# Patient Record
Sex: FEMALE | Race: WHITE | NOT HISPANIC OR LATINO | Employment: UNEMPLOYED | URBAN - METROPOLITAN AREA
[De-identification: names, ages, dates, MRNs, and addresses within clinical notes are randomized per-mention and may not be internally consistent; named-entity substitution may affect disease eponyms.]

---

## 2024-01-01 ENCOUNTER — APPOINTMENT (OUTPATIENT)
Dept: CT IMAGING | Facility: HOSPITAL | Age: 74
DRG: 871 | End: 2024-01-01
Payer: MEDICARE

## 2024-01-01 ENCOUNTER — HOSPITAL ENCOUNTER (INPATIENT)
Facility: HOSPITAL | Age: 74
LOS: 8 days | DRG: 871 | End: 2024-08-23
Attending: EMERGENCY MEDICINE | Admitting: INTERNAL MEDICINE
Payer: MEDICARE

## 2024-01-01 ENCOUNTER — APPOINTMENT (OUTPATIENT)
Dept: GENERAL RADIOLOGY | Facility: HOSPITAL | Age: 74
DRG: 871 | End: 2024-01-01
Payer: MEDICARE

## 2024-01-01 ENCOUNTER — APPOINTMENT (OUTPATIENT)
Dept: NEUROLOGY | Facility: HOSPITAL | Age: 74
DRG: 871 | End: 2024-01-01
Payer: MEDICARE

## 2024-01-01 ENCOUNTER — APPOINTMENT (OUTPATIENT)
Dept: MRI IMAGING | Facility: HOSPITAL | Age: 74
DRG: 871 | End: 2024-01-01
Payer: MEDICARE

## 2024-01-01 ENCOUNTER — HOSPITAL ENCOUNTER (INPATIENT)
Facility: HOSPITAL | Age: 74
LOS: 1 days | End: 2024-08-24
Attending: STUDENT IN AN ORGANIZED HEALTH CARE EDUCATION/TRAINING PROGRAM | Admitting: STUDENT IN AN ORGANIZED HEALTH CARE EDUCATION/TRAINING PROGRAM
Payer: COMMERCIAL

## 2024-01-01 VITALS
WEIGHT: 169.53 LBS | DIASTOLIC BLOOD PRESSURE: 51 MMHG | TEMPERATURE: 99.6 F | RESPIRATION RATE: 12 BRPM | BODY MASS INDEX: 32.01 KG/M2 | HEIGHT: 61 IN | SYSTOLIC BLOOD PRESSURE: 71 MMHG | HEART RATE: 112 BPM | OXYGEN SATURATION: 89 %

## 2024-01-01 DIAGNOSIS — E87.0 HYPERNATREMIA: ICD-10-CM

## 2024-01-01 DIAGNOSIS — A41.9 SEPTIC SHOCK: Primary | ICD-10-CM

## 2024-01-01 DIAGNOSIS — R65.21 SEPTIC SHOCK: Primary | ICD-10-CM

## 2024-01-01 LAB
25(OH)D3 SERPL-MCNC: <6 NG/ML (ref 30–100)
A FLAVUS AB SER QL ID: NEGATIVE
A FUMIGATUS AB SER QL ID: NEGATIVE
A NIGER AB SER QL ID: NEGATIVE
ALBUMIN SERPL ELPH-MCNC: 1.8 G/DL (ref 2.9–4.4)
ALBUMIN SERPL-MCNC: 2.2 G/DL (ref 3.5–5.2)
ALBUMIN SERPL-MCNC: 2.2 G/DL (ref 3.5–5.2)
ALBUMIN SERPL-MCNC: 2.3 G/DL (ref 3.5–5.2)
ALBUMIN SERPL-MCNC: 2.3 G/DL (ref 3.5–5.2)
ALBUMIN SERPL-MCNC: 2.4 G/DL (ref 3.5–5.2)
ALBUMIN SERPL-MCNC: 2.5 G/DL (ref 3.5–5.2)
ALBUMIN SERPL-MCNC: 2.6 G/DL (ref 3.5–5.2)
ALBUMIN SERPL-MCNC: 2.9 G/DL (ref 3.5–5.2)
ALBUMIN SERPL-MCNC: 3.8 G/DL (ref 3.5–5.2)
ALBUMIN/GLOB SERPL: 0.6 {RATIO} (ref 0.7–1.7)
ALBUMIN/GLOB SERPL: 1 G/DL
ALBUMIN/GLOB SERPL: 1 G/DL
ALBUMIN/GLOB SERPL: 1.2 G/DL
ALBUMIN/GLOB SERPL: 1.5 G/DL
ALP SERPL-CCNC: 115 U/L (ref 39–117)
ALP SERPL-CCNC: 84 U/L (ref 39–117)
ALP SERPL-CCNC: 89 U/L (ref 39–117)
ALP SERPL-CCNC: 90 U/L (ref 39–117)
ALPHA1 GLOB SERPL ELPH-MCNC: 0.5 G/DL (ref 0–0.4)
ALPHA2 GLOB SERPL ELPH-MCNC: 0.9 G/DL (ref 0.4–1)
ALT SERPL W P-5'-P-CCNC: 36 U/L (ref 1–33)
ALT SERPL W P-5'-P-CCNC: 38 U/L (ref 1–33)
ALT SERPL W P-5'-P-CCNC: 38 U/L (ref 1–33)
ALT SERPL W P-5'-P-CCNC: 52 U/L (ref 1–33)
AMPHET+METHAMPHET UR QL: NEGATIVE
ANION GAP SERPL CALCULATED.3IONS-SCNC: 10 MMOL/L (ref 5–15)
ANION GAP SERPL CALCULATED.3IONS-SCNC: 10 MMOL/L (ref 5–15)
ANION GAP SERPL CALCULATED.3IONS-SCNC: 10.8 MMOL/L (ref 5–15)
ANION GAP SERPL CALCULATED.3IONS-SCNC: 11 MMOL/L (ref 5–15)
ANION GAP SERPL CALCULATED.3IONS-SCNC: 12 MMOL/L (ref 5–15)
ANION GAP SERPL CALCULATED.3IONS-SCNC: 12 MMOL/L (ref 5–15)
ANION GAP SERPL CALCULATED.3IONS-SCNC: 12.1 MMOL/L (ref 5–15)
ANION GAP SERPL CALCULATED.3IONS-SCNC: 12.4 MMOL/L (ref 5–15)
ANION GAP SERPL CALCULATED.3IONS-SCNC: 12.7 MMOL/L (ref 5–15)
ANION GAP SERPL CALCULATED.3IONS-SCNC: 13.7 MMOL/L (ref 5–15)
ANION GAP SERPL CALCULATED.3IONS-SCNC: 13.8 MMOL/L (ref 5–15)
ANION GAP SERPL CALCULATED.3IONS-SCNC: 8 MMOL/L (ref 5–15)
APTT PPP: 23.5 SECONDS (ref 22.7–35.4)
ARSENIC BLD-MCNC: 2 UG/L (ref 0–9)
ARTERIAL PATENCY WRIST A: POSITIVE
AST SERPL-CCNC: 32 U/L (ref 1–32)
AST SERPL-CCNC: 34 U/L (ref 1–32)
AST SERPL-CCNC: 44 U/L (ref 1–32)
AST SERPL-CCNC: 46 U/L (ref 1–32)
ATMOSPHERIC PRESS: 749.1 MMHG
ATMOSPHERIC PRESS: 753.2 MMHG
B DERMAT AB TITR SER: NEGATIVE {TITER}
B-GLOBULIN SERPL ELPH-MCNC: 0.8 G/DL (ref 0.7–1.3)
BACTERIA SPEC AEROBE CULT: NORMAL
BACTERIA SPEC AEROBE CULT: NORMAL
BACTERIA UR QL AUTO: ABNORMAL /HPF
BARBITURATES UR QL SCN: NEGATIVE
BASE EXCESS BLDA CALC-SCNC: -0.4 MMOL/L (ref 0–2)
BASE EXCESS BLDV CALC-SCNC: 2 MMOL/L (ref -2–2)
BASOPHILS # BLD AUTO: 0.02 10*3/MM3 (ref 0–0.2)
BASOPHILS # BLD AUTO: 0.03 10*3/MM3 (ref 0–0.2)
BASOPHILS # BLD AUTO: 0.04 10*3/MM3 (ref 0–0.2)
BASOPHILS # BLD AUTO: 0.04 10*3/MM3 (ref 0–0.2)
BASOPHILS # BLD MANUAL: 0 10*3/MM3 (ref 0–0.2)
BASOPHILS NFR BLD AUTO: 0.1 % (ref 0–1.5)
BASOPHILS NFR BLD AUTO: 0.2 % (ref 0–1.5)
BASOPHILS NFR BLD MANUAL: 0 % (ref 0–1.5)
BDY SITE: ABNORMAL
BENZODIAZ UR QL SCN: NEGATIVE
BILIRUB SERPL-MCNC: 0.6 MG/DL (ref 0–1.2)
BILIRUB SERPL-MCNC: 0.6 MG/DL (ref 0–1.2)
BILIRUB SERPL-MCNC: 0.8 MG/DL (ref 0–1.2)
BILIRUB SERPL-MCNC: 0.9 MG/DL (ref 0–1.2)
BILIRUB UR QL STRIP: NEGATIVE
BUN SERPL-MCNC: 11 MG/DL (ref 8–23)
BUN SERPL-MCNC: 11 MG/DL (ref 8–23)
BUN SERPL-MCNC: 20 MG/DL (ref 8–23)
BUN SERPL-MCNC: 23 MG/DL (ref 8–23)
BUN SERPL-MCNC: 27 MG/DL (ref 8–23)
BUN SERPL-MCNC: 30 MG/DL (ref 8–23)
BUN SERPL-MCNC: 30 MG/DL (ref 8–23)
BUN SERPL-MCNC: 6 MG/DL (ref 8–23)
BUN SERPL-MCNC: 7 MG/DL (ref 8–23)
BUN SERPL-MCNC: 7 MG/DL (ref 8–23)
BUN SERPL-MCNC: 8 MG/DL (ref 8–23)
BUN SERPL-MCNC: 8 MG/DL (ref 8–23)
BUN/CREAT SERPL: 13.3 (ref 7–25)
BUN/CREAT SERPL: 14.6 (ref 7–25)
BUN/CREAT SERPL: 15.2 (ref 7–25)
BUN/CREAT SERPL: 15.4 (ref 7–25)
BUN/CREAT SERPL: 16 (ref 7–25)
BUN/CREAT SERPL: 17.5 (ref 7–25)
BUN/CREAT SERPL: 22 (ref 7–25)
BUN/CREAT SERPL: 23.4 (ref 7–25)
BUN/CREAT SERPL: 26.7 (ref 7–25)
BUN/CREAT SERPL: 28.4 (ref 7–25)
BUN/CREAT SERPL: 29.7 (ref 7–25)
BUN/CREAT SERPL: 33.8 (ref 7–25)
C-ANCA TITR SER IF: NORMAL TITER
C3 SERPL-MCNC: 153 MG/DL (ref 82–167)
C4 SERPL-MCNC: 30 MG/DL (ref 14–44)
CA-I BLDA-SCNC: 1.23 MMOL/L (ref 2.2–2.55)
CADMIUM BLD-MCNC: 1.6 UG/L (ref 0–1.2)
CALCIUM 24H UR-MCNC: 1.5 MG/DL
CALCIUM 24H UR-MRATE: 18 MG/24 HR (ref 100–300)
CALCIUM SPEC-SCNC: 10.4 MG/DL (ref 8.6–10.5)
CALCIUM SPEC-SCNC: 7.8 MG/DL (ref 8.6–10.5)
CALCIUM SPEC-SCNC: 8.1 MG/DL (ref 8.6–10.5)
CALCIUM SPEC-SCNC: 8.1 MG/DL (ref 8.6–10.5)
CALCIUM SPEC-SCNC: 8.2 MG/DL (ref 8.6–10.5)
CALCIUM SPEC-SCNC: 8.3 MG/DL (ref 8.6–10.5)
CALCIUM SPEC-SCNC: 8.4 MG/DL (ref 8.6–10.5)
CALCIUM SPEC-SCNC: 8.4 MG/DL (ref 8.6–10.5)
CALCIUM SPEC-SCNC: 8.9 MG/DL (ref 8.6–10.5)
CANNABINOIDS SERPL QL: NEGATIVE
CENTROMERE B AB SER-ACNC: <0.2 AI (ref 0–0.9)
CHLORIDE SERPL-SCNC: 107 MMOL/L (ref 98–107)
CHLORIDE SERPL-SCNC: 112 MMOL/L (ref 98–107)
CHLORIDE SERPL-SCNC: 113 MMOL/L (ref 98–107)
CHLORIDE SERPL-SCNC: 113 MMOL/L (ref 98–107)
CHLORIDE SERPL-SCNC: 114 MMOL/L (ref 98–107)
CHLORIDE SERPL-SCNC: 115 MMOL/L (ref 98–107)
CHLORIDE SERPL-SCNC: 118 MMOL/L (ref 98–107)
CHLORIDE SERPL-SCNC: 124 MMOL/L (ref 98–107)
CHLORIDE SERPL-SCNC: 125 MMOL/L (ref 98–107)
CHLORIDE SERPL-SCNC: 126 MMOL/L (ref 98–107)
CHLORIDE SERPL-SCNC: 126 MMOL/L (ref 98–107)
CHLORIDE SERPL-SCNC: 129 MMOL/L (ref 98–107)
CHROMATIN AB SERPL-ACNC: <0.2 AI (ref 0–0.9)
CK SERPL-CCNC: 157 U/L (ref 20–180)
CLARITY UR: CLEAR
CO2 BLDA-SCNC: 23.2 MMOL/L (ref 23–27)
CO2 SERPL-SCNC: 17 MMOL/L (ref 22–29)
CO2 SERPL-SCNC: 17 MMOL/L (ref 22–29)
CO2 SERPL-SCNC: 17.6 MMOL/L (ref 22–29)
CO2 SERPL-SCNC: 17.9 MMOL/L (ref 22–29)
CO2 SERPL-SCNC: 18.3 MMOL/L (ref 22–29)
CO2 SERPL-SCNC: 19 MMOL/L (ref 22–29)
CO2 SERPL-SCNC: 20 MMOL/L (ref 22–29)
CO2 SERPL-SCNC: 20 MMOL/L (ref 22–29)
CO2 SERPL-SCNC: 20.2 MMOL/L (ref 22–29)
CO2 SERPL-SCNC: 20.3 MMOL/L (ref 22–29)
CO2 SERPL-SCNC: 21 MMOL/L (ref 22–29)
CO2 SERPL-SCNC: 24.2 MMOL/L (ref 22–29)
COCAINE UR QL: NEGATIVE
COLLECT DURATION TIME UR: 24 HRS
COLOR UR: ABNORMAL
CREAT SERPL-MCNC: 0.45 MG/DL (ref 0.57–1)
CREAT SERPL-MCNC: 0.46 MG/DL (ref 0.57–1)
CREAT SERPL-MCNC: 0.48 MG/DL (ref 0.57–1)
CREAT SERPL-MCNC: 0.5 MG/DL (ref 0.57–1)
CREAT SERPL-MCNC: 0.5 MG/DL (ref 0.57–1)
CREAT SERPL-MCNC: 0.52 MG/DL (ref 0.57–1)
CREAT SERPL-MCNC: 0.63 MG/DL (ref 0.57–1)
CREAT SERPL-MCNC: 0.75 MG/DL (ref 0.57–1)
CREAT SERPL-MCNC: 0.8 MG/DL (ref 0.57–1)
CREAT SERPL-MCNC: 0.81 MG/DL (ref 0.57–1)
CREAT SERPL-MCNC: 1.01 MG/DL (ref 0.57–1)
CREAT SERPL-MCNC: 1.28 MG/DL (ref 0.57–1)
D-LACTATE SERPL-SCNC: 1.9 MMOL/L (ref 0.5–2)
D-LACTATE SERPL-SCNC: 2.2 MMOL/L (ref 0.5–2)
D-LACTATE SERPL-SCNC: 2.2 MMOL/L (ref 0.5–2)
D-LACTATE SERPL-SCNC: 2.8 MMOL/L (ref 0.5–2)
D-LACTATE SERPL-SCNC: 3.2 MMOL/L (ref 0.5–2)
D-LACTATE SERPL-SCNC: 4.1 MMOL/L (ref 0.5–2)
DACRYOCYTES BLD QL SMEAR: ABNORMAL
DEPRECATED RDW RBC AUTO: 45.4 FL (ref 37–54)
DEPRECATED RDW RBC AUTO: 46.5 FL (ref 37–54)
DEPRECATED RDW RBC AUTO: 46.5 FL (ref 37–54)
DEPRECATED RDW RBC AUTO: 47.1 FL (ref 37–54)
DEPRECATED RDW RBC AUTO: 48.4 FL (ref 37–54)
DEVICE COMMENT: ABNORMAL
DSDNA AB SER-ACNC: <1 IU/ML (ref 0–9)
EGFRCR SERPLBLD CKD-EPI 2021: 100.8 ML/MIN/1.73
EGFRCR SERPLBLD CKD-EPI 2021: 101.8 ML/MIN/1.73
EGFRCR SERPLBLD CKD-EPI 2021: 102.4 ML/MIN/1.73
EGFRCR SERPLBLD CKD-EPI 2021: 44.6 ML/MIN/1.73
EGFRCR SERPLBLD CKD-EPI 2021: 59.3 ML/MIN/1.73
EGFRCR SERPLBLD CKD-EPI 2021: 77.2 ML/MIN/1.73
EGFRCR SERPLBLD CKD-EPI 2021: 78.4 ML/MIN/1.73
EGFRCR SERPLBLD CKD-EPI 2021: 84.7 ML/MIN/1.73
EGFRCR SERPLBLD CKD-EPI 2021: 94.4 ML/MIN/1.73
EGFRCR SERPLBLD CKD-EPI 2021: 98.9 ML/MIN/1.73
EGFRCR SERPLBLD CKD-EPI 2021: 99.8 ML/MIN/1.73
EGFRCR SERPLBLD CKD-EPI 2021: 99.8 ML/MIN/1.73
ENA JO1 AB SER-ACNC: <0.2 AI (ref 0–0.9)
ENA RNP AB SER-ACNC: <0.2 AI (ref 0–0.9)
ENA SCL70 AB SER-ACNC: <0.2 AI (ref 0–0.9)
ENA SM AB SER-ACNC: <0.2 AI (ref 0–0.9)
ENA SS-A AB SER-ACNC: <0.2 AI (ref 0–0.9)
ENA SS-B AB SER-ACNC: <0.2 AI (ref 0–0.9)
EOSINOPHIL # BLD AUTO: 0.01 10*3/MM3 (ref 0–0.4)
EOSINOPHIL # BLD AUTO: 0.03 10*3/MM3 (ref 0–0.4)
EOSINOPHIL # BLD AUTO: 0.03 10*3/MM3 (ref 0–0.4)
EOSINOPHIL # BLD AUTO: 0.04 10*3/MM3 (ref 0–0.4)
EOSINOPHIL # BLD MANUAL: 0 10*3/MM3 (ref 0–0.4)
EOSINOPHIL NFR BLD AUTO: 0 % (ref 0.3–6.2)
EOSINOPHIL NFR BLD AUTO: 0.2 % (ref 0.3–6.2)
EOSINOPHIL NFR BLD MANUAL: 0 % (ref 0.3–6.2)
ERYTHROCYTE [DISTWIDTH] IN BLOOD BY AUTOMATED COUNT: 14.4 % (ref 12.3–15.4)
ERYTHROCYTE [DISTWIDTH] IN BLOOD BY AUTOMATED COUNT: 14.5 % (ref 12.3–15.4)
ERYTHROCYTE [DISTWIDTH] IN BLOOD BY AUTOMATED COUNT: 14.7 % (ref 12.3–15.4)
ERYTHROCYTE [DISTWIDTH] IN BLOOD BY AUTOMATED COUNT: 14.8 % (ref 12.3–15.4)
ERYTHROCYTE [DISTWIDTH] IN BLOOD BY AUTOMATED COUNT: 15 % (ref 12.3–15.4)
FENTANYL UR-MCNC: NEGATIVE NG/ML
FOLATE SERPL-MCNC: <2 NG/ML (ref 4.78–24.2)
GAMMA GLOB SERPL ELPH-MCNC: 0.6 G/DL (ref 0.4–1.8)
GAS FLOW AIRWAY: 4 LPM
GEN 5 2HR TROPONIN T REFLEX: 30 NG/L
GLOBULIN SER CALC-MCNC: 2.8 G/DL (ref 2.2–3.9)
GLOBULIN UR ELPH-MCNC: 2 GM/DL
GLOBULIN UR ELPH-MCNC: 2.5 GM/DL
GLOBULIN UR ELPH-MCNC: 2.5 GM/DL
GLOBULIN UR ELPH-MCNC: 3.2 GM/DL
GLUCOSE BLDC GLUCOMTR-MCNC: 104 MG/DL (ref 70–130)
GLUCOSE BLDC GLUCOMTR-MCNC: 105 MG/DL (ref 70–130)
GLUCOSE BLDC GLUCOMTR-MCNC: 110 MG/DL (ref 70–130)
GLUCOSE BLDC GLUCOMTR-MCNC: 114 MG/DL (ref 70–130)
GLUCOSE BLDC GLUCOMTR-MCNC: 118 MG/DL (ref 70–130)
GLUCOSE BLDC GLUCOMTR-MCNC: 119 MG/DL (ref 70–130)
GLUCOSE BLDC GLUCOMTR-MCNC: 120 MG/DL (ref 70–130)
GLUCOSE BLDC GLUCOMTR-MCNC: 122 MG/DL (ref 70–130)
GLUCOSE BLDC GLUCOMTR-MCNC: 122 MG/DL (ref 70–130)
GLUCOSE BLDC GLUCOMTR-MCNC: 123 MG/DL (ref 70–130)
GLUCOSE BLDC GLUCOMTR-MCNC: 127 MG/DL (ref 70–130)
GLUCOSE BLDC GLUCOMTR-MCNC: 128 MG/DL (ref 70–130)
GLUCOSE BLDC GLUCOMTR-MCNC: 129 MG/DL (ref 70–130)
GLUCOSE BLDC GLUCOMTR-MCNC: 136 MG/DL (ref 70–130)
GLUCOSE BLDC GLUCOMTR-MCNC: 139 MG/DL (ref 70–130)
GLUCOSE BLDC GLUCOMTR-MCNC: 140 MG/DL (ref 70–130)
GLUCOSE BLDC GLUCOMTR-MCNC: 144 MG/DL (ref 70–130)
GLUCOSE BLDC GLUCOMTR-MCNC: 145 MG/DL (ref 70–130)
GLUCOSE BLDC GLUCOMTR-MCNC: 147 MG/DL (ref 70–130)
GLUCOSE BLDC GLUCOMTR-MCNC: 150 MG/DL (ref 70–130)
GLUCOSE BLDC GLUCOMTR-MCNC: 153 MG/DL (ref 70–130)
GLUCOSE BLDC GLUCOMTR-MCNC: 154 MG/DL (ref 70–130)
GLUCOSE BLDC GLUCOMTR-MCNC: 158 MG/DL (ref 65–99)
GLUCOSE BLDC GLUCOMTR-MCNC: 162 MG/DL (ref 70–130)
GLUCOSE BLDC GLUCOMTR-MCNC: 163 MG/DL (ref 70–130)
GLUCOSE BLDC GLUCOMTR-MCNC: 163 MG/DL (ref 70–130)
GLUCOSE BLDC GLUCOMTR-MCNC: 184 MG/DL (ref 70–130)
GLUCOSE BLDC GLUCOMTR-MCNC: 220 MG/DL (ref 70–130)
GLUCOSE BLDC GLUCOMTR-MCNC: 93 MG/DL (ref 70–130)
GLUCOSE BLDC GLUCOMTR-MCNC: 95 MG/DL (ref 70–130)
GLUCOSE SERPL-MCNC: 107 MG/DL (ref 65–99)
GLUCOSE SERPL-MCNC: 109 MG/DL (ref 65–99)
GLUCOSE SERPL-MCNC: 118 MG/DL (ref 65–99)
GLUCOSE SERPL-MCNC: 127 MG/DL (ref 65–99)
GLUCOSE SERPL-MCNC: 144 MG/DL (ref 65–99)
GLUCOSE SERPL-MCNC: 144 MG/DL (ref 65–99)
GLUCOSE SERPL-MCNC: 145 MG/DL (ref 65–99)
GLUCOSE SERPL-MCNC: 149 MG/DL (ref 65–99)
GLUCOSE SERPL-MCNC: 170 MG/DL (ref 65–99)
GLUCOSE SERPL-MCNC: 88 MG/DL (ref 65–99)
GLUCOSE SERPL-MCNC: 89 MG/DL (ref 65–99)
GLUCOSE SERPL-MCNC: 94 MG/DL (ref 65–99)
GLUCOSE UR STRIP-MCNC: NEGATIVE MG/DL
H CAPSUL AG UR QL IA: NEGATIVE
HCO3 BLDA-SCNC: 22.3 MMOL/L (ref 22–28)
HCO3 BLDV-SCNC: 26.5 MMOL/L (ref 22–28)
HCT VFR BLD AUTO: 26 % (ref 34–46.6)
HCT VFR BLD AUTO: 29.7 % (ref 34–46.6)
HCT VFR BLD AUTO: 29.9 % (ref 34–46.6)
HCT VFR BLD AUTO: 33.8 % (ref 34–46.6)
HCT VFR BLD AUTO: 44.8 % (ref 34–46.6)
HCT VFR BLDA CALC: 43 % (ref 38–51)
HEMODILUTION: NO
HGB BLD-MCNC: 10.2 G/DL (ref 12–15.9)
HGB BLD-MCNC: 11.2 G/DL (ref 12–15.9)
HGB BLD-MCNC: 14.7 G/DL (ref 12–15.9)
HGB BLD-MCNC: 8.6 G/DL (ref 12–15.9)
HGB BLD-MCNC: 9.8 G/DL (ref 12–15.9)
HGB BLDA-MCNC: 14.8 G/DL (ref 12–17)
HGB UR QL STRIP.AUTO: NEGATIVE
HOLD SPECIMEN: NORMAL
HYALINE CASTS UR QL AUTO: ABNORMAL /LPF
HYPOCHROMIA BLD QL: ABNORMAL
IMM GRANULOCYTES # BLD AUTO: 0.21 10*3/MM3 (ref 0–0.05)
IMM GRANULOCYTES # BLD AUTO: 0.24 10*3/MM3 (ref 0–0.05)
IMM GRANULOCYTES # BLD AUTO: 0.3 10*3/MM3 (ref 0–0.05)
IMM GRANULOCYTES # BLD AUTO: 0.3 10*3/MM3 (ref 0–0.05)
IMM GRANULOCYTES NFR BLD AUTO: 1.3 % (ref 0–0.5)
IMM GRANULOCYTES NFR BLD AUTO: 1.4 % (ref 0–0.5)
IMM GRANULOCYTES NFR BLD AUTO: 1.6 % (ref 0–0.5)
IMM GRANULOCYTES NFR BLD AUTO: 1.6 % (ref 0–0.5)
INHALED O2 CONCENTRATION: 21 %
INR PPP: 1.24 (ref 0.9–1.1)
KAPPA LC FREE 24H UR-MRATE: 323.95 MG/24 HR
KAPPA LC FREE UR-MCNC: 269.96 MG/L (ref 1.17–86.46)
KAPPA LC FREE/LAMBDA FREE UR: 3.48 (ref 1.83–14.26)
KETONES UR QL STRIP: ABNORMAL
LABORATORY COMMENT REPORT: ABNORMAL
LAMBDA LC FREE 24H UR-MRATE: 92.99 MG/24 HR
LAMBDA LC FREE UR-MCNC: 77.49 MG/L (ref 0.27–15.21)
LEAD BLDV-MCNC: <1 UG/DL (ref 0–3.4)
LEUKOCYTE ESTERASE UR QL STRIP.AUTO: ABNORMAL
LIPASE SERPL-CCNC: 31 U/L (ref 13–60)
LYMPHOCYTES # BLD AUTO: 1.15 10*3/MM3 (ref 0.7–3.1)
LYMPHOCYTES # BLD AUTO: 1.3 10*3/MM3 (ref 0.7–3.1)
LYMPHOCYTES # BLD AUTO: 1.42 10*3/MM3 (ref 0.7–3.1)
LYMPHOCYTES # BLD AUTO: 1.64 10*3/MM3 (ref 0.7–3.1)
LYMPHOCYTES # BLD MANUAL: 0.77 10*3/MM3 (ref 0.7–3.1)
LYMPHOCYTES NFR BLD AUTO: 6.1 % (ref 19.6–45.3)
LYMPHOCYTES NFR BLD AUTO: 7.5 % (ref 19.6–45.3)
LYMPHOCYTES NFR BLD AUTO: 7.6 % (ref 19.6–45.3)
LYMPHOCYTES NFR BLD AUTO: 9.9 % (ref 19.6–45.3)
LYMPHOCYTES NFR BLD MANUAL: 8.2 % (ref 5–12)
Lab: NORMAL
M PROTEIN SERPL ELPH-MCNC: ABNORMAL G/DL
MAGNESIUM SERPL-MCNC: 2.1 MG/DL (ref 1.6–2.4)
MAGNESIUM SERPL-MCNC: 2.9 MG/DL (ref 1.6–2.4)
MCH RBC QN AUTO: 29.3 PG (ref 26.6–33)
MCH RBC QN AUTO: 29.3 PG (ref 26.6–33)
MCH RBC QN AUTO: 29.7 PG (ref 26.6–33)
MCH RBC QN AUTO: 30.4 PG (ref 26.6–33)
MCH RBC QN AUTO: 30.4 PG (ref 26.6–33)
MCHC RBC AUTO-ENTMCNC: 32.8 G/DL (ref 31.5–35.7)
MCHC RBC AUTO-ENTMCNC: 33 G/DL (ref 31.5–35.7)
MCHC RBC AUTO-ENTMCNC: 33.1 G/DL (ref 31.5–35.7)
MCHC RBC AUTO-ENTMCNC: 33.1 G/DL (ref 31.5–35.7)
MCHC RBC AUTO-ENTMCNC: 34.1 G/DL (ref 31.5–35.7)
MCV RBC AUTO: 88.4 FL (ref 79–97)
MCV RBC AUTO: 88.9 FL (ref 79–97)
MCV RBC AUTO: 89.3 FL (ref 79–97)
MCV RBC AUTO: 90.5 FL (ref 79–97)
MCV RBC AUTO: 91.8 FL (ref 79–97)
MERCURY BLD-MCNC: <1 UG/L (ref 0–14.9)
METAMYELOCYTES NFR BLD MANUAL: 1 % (ref 0–0)
METHADONE UR QL SCN: NEGATIVE
MODALITY: ABNORMAL
MODALITY: ABNORMAL
MONOCYTES # BLD AUTO: 0.66 10*3/MM3 (ref 0.1–0.9)
MONOCYTES # BLD AUTO: 1.19 10*3/MM3 (ref 0.1–0.9)
MONOCYTES # BLD AUTO: 1.22 10*3/MM3 (ref 0.1–0.9)
MONOCYTES # BLD AUTO: 1.41 10*3/MM3 (ref 0.1–0.9)
MONOCYTES # BLD: 0.89 10*3/MM3 (ref 0.1–0.9)
MONOCYTES NFR BLD AUTO: 4.4 % (ref 5–12)
MONOCYTES NFR BLD AUTO: 6.4 % (ref 5–12)
MONOCYTES NFR BLD AUTO: 6.6 % (ref 5–12)
MONOCYTES NFR BLD AUTO: 7.2 % (ref 5–12)
MYELOCYTES NFR BLD MANUAL: 2 % (ref 0–0)
MYELOPEROXIDASE AB SER IA-ACNC: <0.2 UNITS (ref 0–0.9)
NEUTROPHILS # BLD AUTO: 8.81 10*3/MM3 (ref 1.7–7)
NEUTROPHILS NFR BLD AUTO: 12.97 10*3/MM3 (ref 1.7–7)
NEUTROPHILS NFR BLD AUTO: 13.42 10*3/MM3 (ref 1.7–7)
NEUTROPHILS NFR BLD AUTO: 15.96 10*3/MM3 (ref 1.7–7)
NEUTROPHILS NFR BLD AUTO: 18.18 10*3/MM3 (ref 1.7–7)
NEUTROPHILS NFR BLD AUTO: 81.2 % (ref 42.7–76)
NEUTROPHILS NFR BLD AUTO: 84.1 % (ref 42.7–76)
NEUTROPHILS NFR BLD AUTO: 85.7 % (ref 42.7–76)
NEUTROPHILS NFR BLD AUTO: 86.1 % (ref 42.7–76)
NEUTROPHILS NFR BLD MANUAL: 81.6 % (ref 42.7–76)
NITRITE UR QL STRIP: POSITIVE
NOTIFIED WHO: ABNORMAL
NRBC BLD AUTO-RTO: 0.5 /100 WBC (ref 0–0.2)
NRBC BLD AUTO-RTO: 0.9 /100 WBC (ref 0–0.2)
NRBC BLD AUTO-RTO: 1 /100 WBC (ref 0–0.2)
NRBC SPEC MANUAL: 3.1 /100 WBC (ref 0–0.2)
NT-PROBNP SERPL-MCNC: 631 PG/ML (ref 0–900)
OPIATES UR QL: NEGATIVE
OXYCODONE UR QL SCN: NEGATIVE
P-ANCA ATYPICAL TITR SER IF: NORMAL TITER
P-ANCA TITR SER IF: NORMAL TITER
PCO2 BLDA: 28.9 MM HG (ref 35–45)
PCO2 BLDV: 40.4 MM HG (ref 41–51)
PH BLDA: 7.5 PH UNITS (ref 7.35–7.45)
PH BLDV: 7.43 PH UNITS (ref 7.31–7.41)
PH UR STRIP.AUTO: 5.5 [PH] (ref 5–8)
PHOSPHATE SERPL-MCNC: 1.5 MG/DL (ref 2.5–4.5)
PHOSPHATE SERPL-MCNC: 1.7 MG/DL (ref 2.5–4.5)
PHOSPHATE SERPL-MCNC: 1.7 MG/DL (ref 2.5–4.5)
PHOSPHATE SERPL-MCNC: 1.8 MG/DL (ref 2.5–4.5)
PHOSPHATE SERPL-MCNC: 1.8 MG/DL (ref 2.5–4.5)
PHOSPHATE SERPL-MCNC: 1.9 MG/DL (ref 2.5–4.5)
PHOSPHATE SERPL-MCNC: 2.1 MG/DL (ref 2.5–4.5)
PHOSPHATE SERPL-MCNC: 2.4 MG/DL (ref 2.5–4.5)
PHOSPHATE SERPL-MCNC: 2.5 MG/DL (ref 2.5–4.5)
PHOSPHATE SERPL-MCNC: 2.5 MG/DL (ref 2.5–4.5)
PHOSPHATE SERPL-MCNC: 3.4 MG/DL (ref 2.5–4.5)
PHOSPHATE SERPL-MCNC: 4.7 MG/DL (ref 2.5–4.5)
PLAT MORPH BLD: NORMAL
PLATELET # BLD AUTO: 123 10*3/MM3 (ref 140–450)
PLATELET # BLD AUTO: 138 10*3/MM3 (ref 140–450)
PLATELET # BLD AUTO: 143 10*3/MM3 (ref 140–450)
PLATELET # BLD AUTO: 183 10*3/MM3 (ref 140–450)
PLATELET # BLD AUTO: 238 10*3/MM3 (ref 140–450)
PLATELET # BLD AUTO: 243 10*3/MM3 (ref 140–450)
PMV BLD AUTO: 10.5 FL (ref 6–12)
PMV BLD AUTO: 10.6 FL (ref 6–12)
PMV BLD AUTO: 10.7 FL (ref 6–12)
PMV BLD AUTO: 10.8 FL (ref 6–12)
PMV BLD AUTO: 9.5 FL (ref 6–12)
PO2 BLDA: 55.6 MM HG (ref 80–100)
PO2 BLDV: 31.1 MM HG (ref 35–45)
POTASSIUM BLDA-SCNC: 3.5 MMOL/L (ref 3.5–5.2)
POTASSIUM SERPL-SCNC: 2.9 MMOL/L (ref 3.5–5.2)
POTASSIUM SERPL-SCNC: 3 MMOL/L (ref 3.5–5.2)
POTASSIUM SERPL-SCNC: 3.2 MMOL/L (ref 3.5–5.2)
POTASSIUM SERPL-SCNC: 3.3 MMOL/L (ref 3.5–5.2)
POTASSIUM SERPL-SCNC: 3.4 MMOL/L (ref 3.5–5.2)
POTASSIUM SERPL-SCNC: 3.5 MMOL/L (ref 3.5–5.2)
POTASSIUM SERPL-SCNC: 3.6 MMOL/L (ref 3.5–5.2)
POTASSIUM SERPL-SCNC: 3.8 MMOL/L (ref 3.5–5.2)
POTASSIUM SERPL-SCNC: 4.1 MMOL/L (ref 3.5–5.2)
POTASSIUM SERPL-SCNC: 4.5 MMOL/L (ref 3.5–5.2)
PROCALCITONIN SERPL-MCNC: 0.33 NG/ML (ref 0–0.25)
PROCALCITONIN SERPL-MCNC: 0.4 NG/ML (ref 0–0.25)
PROCALCITONIN SERPL-MCNC: 1.39 NG/ML (ref 0–0.25)
PROT PATTERN SERPL ELPH-IMP: ABNORMAL
PROT SERPL-MCNC: 4.6 G/DL (ref 6–8.5)
PROT SERPL-MCNC: 4.9 G/DL (ref 6–8.5)
PROT SERPL-MCNC: 5 G/DL (ref 6–8.5)
PROT SERPL-MCNC: 5.1 G/DL (ref 6–8.5)
PROT SERPL-MCNC: 7 G/DL (ref 6–8.5)
PROT UR QL STRIP: ABNORMAL
PROTEINASE3 AB SER IA-ACNC: <0.2 UNITS (ref 0–0.9)
PROTHROMBIN TIME: 15.8 SECONDS (ref 11.7–14.2)
PTH-INTACT SERPL-MCNC: 85.9 PG/ML (ref 15–65)
QT INTERVAL: 466 MS
QTC INTERVAL: 583 MS
RBC # BLD AUTO: 2.94 10*6/MM3 (ref 3.77–5.28)
RBC # BLD AUTO: 3.34 10*6/MM3 (ref 3.77–5.28)
RBC # BLD AUTO: 3.35 10*6/MM3 (ref 3.77–5.28)
RBC # BLD AUTO: 3.68 10*6/MM3 (ref 3.77–5.28)
RBC # BLD AUTO: 4.95 10*6/MM3 (ref 3.77–5.28)
RBC # UR STRIP: ABNORMAL /HPF
REF LAB TEST METHOD: ABNORMAL
RPR SER QL: NORMAL
SAO2 % BLDCOA: 91.5 % (ref 92–98.5)
SAO2 % BLDCOV: 61.2 % (ref 45–75)
SODIUM BLD-SCNC: 168 MMOL/L (ref 136–145)
SODIUM SERPL-SCNC: 141 MMOL/L (ref 136–145)
SODIUM SERPL-SCNC: 142 MMOL/L (ref 136–145)
SODIUM SERPL-SCNC: 142 MMOL/L (ref 136–145)
SODIUM SERPL-SCNC: 144 MMOL/L (ref 136–145)
SODIUM SERPL-SCNC: 144 MMOL/L (ref 136–145)
SODIUM SERPL-SCNC: 145 MMOL/L (ref 136–145)
SODIUM SERPL-SCNC: 146 MMOL/L (ref 136–145)
SODIUM SERPL-SCNC: 154 MMOL/L (ref 136–145)
SODIUM SERPL-SCNC: 154 MMOL/L (ref 136–145)
SODIUM SERPL-SCNC: 155 MMOL/L (ref 136–145)
SODIUM SERPL-SCNC: 162 MMOL/L (ref 136–145)
SODIUM SERPL-SCNC: 164 MMOL/L (ref 136–145)
SP GR UR STRIP: 1.03 (ref 1–1.03)
SPECIMEN VOL 24H UR: 1200 ML
SQUAMOUS #/AREA URNS HPF: ABNORMAL /HPF
TOTAL RATE: 18 BREATHS/MINUTE
TOTAL RATE: 22 BREATHS/MINUTE
TROPONIN T DELTA: 0 NG/L
TROPONIN T SERPL HS-MCNC: 30 NG/L
TSH SERPL DL<=0.05 MIU/L-ACNC: 2.07 UIU/ML (ref 0.27–4.2)
UROBILINOGEN UR QL STRIP: ABNORMAL
VARIANT LYMPHS NFR BLD MANUAL: 7.1 % (ref 19.6–45.3)
VIT B12 BLD-MCNC: 255 PG/ML (ref 211–946)
WBC # UR STRIP: ABNORMAL /HPF
WBC MORPH BLD: NORMAL
WBC NRBC COR # BLD AUTO: 10.8 10*3/MM3 (ref 3.4–10.8)
WBC NRBC COR # BLD AUTO: 15.07 10*3/MM3 (ref 3.4–10.8)
WBC NRBC COR # BLD AUTO: 16.54 10*3/MM3 (ref 3.4–10.8)
WBC NRBC COR # BLD AUTO: 18.96 10*3/MM3 (ref 3.4–10.8)
WBC NRBC COR # BLD AUTO: 21.24 10*3/MM3 (ref 3.4–10.8)
YEAST URNS QL MICRO: PRESENT /HPF

## 2024-01-01 PROCEDURE — 85025 COMPLETE CBC W/AUTO DIFF WBC: CPT

## 2024-01-01 PROCEDURE — 83655 ASSAY OF LEAD: CPT | Performed by: INTERNAL MEDICINE

## 2024-01-01 PROCEDURE — 81001 URINALYSIS AUTO W/SCOPE: CPT | Performed by: EMERGENCY MEDICINE

## 2024-01-01 PROCEDURE — 84165 PROTEIN E-PHORESIS SERUM: CPT | Performed by: INTERNAL MEDICINE

## 2024-01-01 PROCEDURE — 84100 ASSAY OF PHOSPHORUS: CPT | Performed by: EMERGENCY MEDICINE

## 2024-01-01 PROCEDURE — 85025 COMPLETE CBC W/AUTO DIFF WBC: CPT | Performed by: EMERGENCY MEDICINE

## 2024-01-01 PROCEDURE — 82948 REAGENT STRIP/BLOOD GLUCOSE: CPT

## 2024-01-01 PROCEDURE — 94761 N-INVAS EAR/PLS OXIMETRY MLT: CPT

## 2024-01-01 PROCEDURE — 80053 COMPREHEN METABOLIC PANEL: CPT | Performed by: INTERNAL MEDICINE

## 2024-01-01 PROCEDURE — 63710000001 INSULIN REGULAR HUMAN PER 5 UNITS

## 2024-01-01 PROCEDURE — 82550 ASSAY OF CK (CPK): CPT | Performed by: EMERGENCY MEDICINE

## 2024-01-01 PROCEDURE — 25010000002 CEFEPIME PER 500 MG: Performed by: INTERNAL MEDICINE

## 2024-01-01 PROCEDURE — 25810000003 SODIUM CHLORIDE 0.9 % SOLUTION: Performed by: INTERNAL MEDICINE

## 2024-01-01 PROCEDURE — 86698 HISTOPLASMA ANTIBODY: CPT | Performed by: INTERNAL MEDICINE

## 2024-01-01 PROCEDURE — 25010000002 LORAZEPAM PER 2 MG: Performed by: INTERNAL MEDICINE

## 2024-01-01 PROCEDURE — 86592 SYPHILIS TEST NON-TREP QUAL: CPT | Performed by: NURSE PRACTITIONER

## 2024-01-01 PROCEDURE — 97530 THERAPEUTIC ACTIVITIES: CPT

## 2024-01-01 PROCEDURE — 80307 DRUG TEST PRSMV CHEM ANLYZR: CPT | Performed by: STUDENT IN AN ORGANIZED HEALTH CARE EDUCATION/TRAINING PROGRAM

## 2024-01-01 PROCEDURE — 85007 BL SMEAR W/DIFF WBC COUNT: CPT

## 2024-01-01 PROCEDURE — 83605 ASSAY OF LACTIC ACID: CPT | Performed by: EMERGENCY MEDICINE

## 2024-01-01 PROCEDURE — 0 DEXTROSE 5 % SOLUTION 1,000 ML FLEX CONT: Performed by: INTERNAL MEDICINE

## 2024-01-01 PROCEDURE — 25810000003 SODIUM CHLORIDE 0.9 % SOLUTION: Performed by: EMERGENCY MEDICINE

## 2024-01-01 PROCEDURE — 83825 ASSAY OF MERCURY: CPT | Performed by: INTERNAL MEDICINE

## 2024-01-01 PROCEDURE — 25010000002 CEFEPIME PER 500 MG: Performed by: EMERGENCY MEDICINE

## 2024-01-01 PROCEDURE — 86037 ANCA TITER EACH ANTIBODY: CPT | Performed by: INTERNAL MEDICINE

## 2024-01-01 PROCEDURE — 86235 NUCLEAR ANTIGEN ANTIBODY: CPT | Performed by: INTERNAL MEDICINE

## 2024-01-01 PROCEDURE — 84132 ASSAY OF SERUM POTASSIUM: CPT | Performed by: INTERNAL MEDICINE

## 2024-01-01 PROCEDURE — 25810000003 LACTATED RINGERS PER 1000 ML: Performed by: INTERNAL MEDICINE

## 2024-01-01 PROCEDURE — 94668 MNPJ CHEST WALL SBSQ: CPT

## 2024-01-01 PROCEDURE — 85730 THROMBOPLASTIN TIME PARTIAL: CPT | Performed by: EMERGENCY MEDICINE

## 2024-01-01 PROCEDURE — 94799 UNLISTED PULMONARY SVC/PX: CPT

## 2024-01-01 PROCEDURE — 25010000002 ENOXAPARIN PER 10 MG: Performed by: INTERNAL MEDICINE

## 2024-01-01 PROCEDURE — 71045 X-RAY EXAM CHEST 1 VIEW: CPT

## 2024-01-01 PROCEDURE — 94640 AIRWAY INHALATION TREATMENT: CPT

## 2024-01-01 PROCEDURE — 25010000002 POTASSIUM CHLORIDE 10 MEQ/100ML SOLUTION: Performed by: INTERNAL MEDICINE

## 2024-01-01 PROCEDURE — 82306 VITAMIN D 25 HYDROXY: CPT | Performed by: NURSE PRACTITIONER

## 2024-01-01 PROCEDURE — 85610 PROTHROMBIN TIME: CPT | Performed by: EMERGENCY MEDICINE

## 2024-01-01 PROCEDURE — 87040 BLOOD CULTURE FOR BACTERIA: CPT | Performed by: EMERGENCY MEDICINE

## 2024-01-01 PROCEDURE — 84145 PROCALCITONIN (PCT): CPT

## 2024-01-01 PROCEDURE — 99233 SBSQ HOSP IP/OBS HIGH 50: CPT | Performed by: NURSE PRACTITIONER

## 2024-01-01 PROCEDURE — A9577 INJ MULTIHANCE: HCPCS | Performed by: INTERNAL MEDICINE

## 2024-01-01 PROCEDURE — 25010000002 MORPHINE PER 10 MG: Performed by: INTERNAL MEDICINE

## 2024-01-01 PROCEDURE — 87385 HISTOPLASMA CAPSUL AG IA: CPT | Performed by: INTERNAL MEDICINE

## 2024-01-01 PROCEDURE — 85025 COMPLETE CBC W/AUTO DIFF WBC: CPT | Performed by: INTERNAL MEDICINE

## 2024-01-01 PROCEDURE — 84100 ASSAY OF PHOSPHORUS: CPT | Performed by: INTERNAL MEDICINE

## 2024-01-01 PROCEDURE — 0 DEXTROSE 5 % SOLUTION: Performed by: INTERNAL MEDICINE

## 2024-01-01 PROCEDURE — 25010000002 GLYCOPYRROLATE 0.2 MG/ML SOLUTION: Performed by: INTERNAL MEDICINE

## 2024-01-01 PROCEDURE — 97166 OT EVAL MOD COMPLEX 45 MIN: CPT

## 2024-01-01 PROCEDURE — 84443 ASSAY THYROID STIM HORMONE: CPT | Performed by: EMERGENCY MEDICINE

## 2024-01-01 PROCEDURE — 84145 PROCALCITONIN (PCT): CPT | Performed by: INTERNAL MEDICINE

## 2024-01-01 PROCEDURE — 82803 BLOOD GASES ANY COMBINATION: CPT

## 2024-01-01 PROCEDURE — 99291 CRITICAL CARE FIRST HOUR: CPT | Performed by: NURSE PRACTITIONER

## 2024-01-01 PROCEDURE — 83735 ASSAY OF MAGNESIUM: CPT | Performed by: EMERGENCY MEDICINE

## 2024-01-01 PROCEDURE — 94667 MNPJ CHEST WALL 1ST: CPT

## 2024-01-01 PROCEDURE — 99222 1ST HOSP IP/OBS MODERATE 55: CPT | Performed by: STUDENT IN AN ORGANIZED HEALTH CARE EDUCATION/TRAINING PROGRAM

## 2024-01-01 PROCEDURE — 94664 DEMO&/EVAL PT USE INHALER: CPT

## 2024-01-01 PROCEDURE — 86160 COMPLEMENT ANTIGEN: CPT | Performed by: INTERNAL MEDICINE

## 2024-01-01 PROCEDURE — 70553 MRI BRAIN STEM W/O & W/DYE: CPT

## 2024-01-01 PROCEDURE — 82300 ASSAY OF CADMIUM: CPT | Performed by: INTERNAL MEDICINE

## 2024-01-01 PROCEDURE — 25010000002 POTASSIUM CHLORIDE PER 2 MEQ OF POTASSIUM: Performed by: INTERNAL MEDICINE

## 2024-01-01 PROCEDURE — 85049 AUTOMATED PLATELET COUNT: CPT | Performed by: INTERNAL MEDICINE

## 2024-01-01 PROCEDURE — 84590 ASSAY OF VITAMIN A: CPT | Performed by: NURSE PRACTITIONER

## 2024-01-01 PROCEDURE — 94760 N-INVAS EAR/PLS OXIMETRY 1: CPT

## 2024-01-01 PROCEDURE — 25010000002 GLYCOPYRROLATE 0.2 MG/ML SOLUTION: Performed by: STUDENT IN AN ORGANIZED HEALTH CARE EDUCATION/TRAINING PROGRAM

## 2024-01-01 PROCEDURE — 92610 EVALUATE SWALLOWING FUNCTION: CPT

## 2024-01-01 PROCEDURE — 86606 ASPERGILLUS ANTIBODY: CPT | Performed by: INTERNAL MEDICINE

## 2024-01-01 PROCEDURE — 25010000002 MORPHINE PER 10 MG: Performed by: STUDENT IN AN ORGANIZED HEALTH CARE EDUCATION/TRAINING PROGRAM

## 2024-01-01 PROCEDURE — 81050 URINALYSIS VOLUME MEASURE: CPT | Performed by: INTERNAL MEDICINE

## 2024-01-01 PROCEDURE — 86612 BLASTOMYCES ANTIBODY: CPT | Performed by: INTERNAL MEDICINE

## 2024-01-01 PROCEDURE — 99291 CRITICAL CARE FIRST HOUR: CPT

## 2024-01-01 PROCEDURE — 99497 ADVNCD CARE PLAN 30 MIN: CPT | Performed by: NURSE PRACTITIONER

## 2024-01-01 PROCEDURE — 84446 ASSAY OF VITAMIN E: CPT | Performed by: NURSE PRACTITIONER

## 2024-01-01 PROCEDURE — 95816 EEG AWAKE AND DROWSY: CPT | Performed by: PSYCHIATRY & NEUROLOGY

## 2024-01-01 PROCEDURE — 82175 ASSAY OF ARSENIC: CPT | Performed by: INTERNAL MEDICINE

## 2024-01-01 PROCEDURE — 25010000002 LORAZEPAM PER 2 MG: Performed by: STUDENT IN AN ORGANIZED HEALTH CARE EDUCATION/TRAINING PROGRAM

## 2024-01-01 PROCEDURE — 25010000002 POTASSIUM CHLORIDE 10 MEQ/100ML SOLUTION

## 2024-01-01 PROCEDURE — 80069 RENAL FUNCTION PANEL: CPT | Performed by: INTERNAL MEDICINE

## 2024-01-01 PROCEDURE — 0 GADOBENATE DIMEGLUMINE 529 MG/ML SOLUTION: Performed by: INTERNAL MEDICINE

## 2024-01-01 PROCEDURE — 25810000003 LACTATED RINGERS SOLUTION: Performed by: INTERNAL MEDICINE

## 2024-01-01 PROCEDURE — 25010000002 CYANOCOBALAMIN PER 1000 MCG: Performed by: NURSE PRACTITIONER

## 2024-01-01 PROCEDURE — 83735 ASSAY OF MAGNESIUM: CPT | Performed by: INTERNAL MEDICINE

## 2024-01-01 PROCEDURE — 99223 1ST HOSP IP/OBS HIGH 75: CPT | Performed by: NURSE PRACTITIONER

## 2024-01-01 PROCEDURE — 84484 ASSAY OF TROPONIN QUANT: CPT | Performed by: EMERGENCY MEDICINE

## 2024-01-01 PROCEDURE — 85018 HEMOGLOBIN: CPT

## 2024-01-01 PROCEDURE — 83690 ASSAY OF LIPASE: CPT | Performed by: EMERGENCY MEDICINE

## 2024-01-01 PROCEDURE — 74176 CT ABD & PELVIS W/O CONTRAST: CPT

## 2024-01-01 PROCEDURE — 82607 VITAMIN B-12: CPT | Performed by: STUDENT IN AN ORGANIZED HEALTH CARE EDUCATION/TRAINING PROGRAM

## 2024-01-01 PROCEDURE — 82330 ASSAY OF CALCIUM: CPT

## 2024-01-01 PROCEDURE — 86225 DNA ANTIBODY NATIVE: CPT | Performed by: INTERNAL MEDICINE

## 2024-01-01 PROCEDURE — 71250 CT THORAX DX C-: CPT

## 2024-01-01 PROCEDURE — 84145 PROCALCITONIN (PCT): CPT | Performed by: EMERGENCY MEDICINE

## 2024-01-01 PROCEDURE — 83516 IMMUNOASSAY NONANTIBODY: CPT | Performed by: INTERNAL MEDICINE

## 2024-01-01 PROCEDURE — 80053 COMPREHEN METABOLIC PANEL: CPT | Performed by: EMERGENCY MEDICINE

## 2024-01-01 PROCEDURE — 97162 PT EVAL MOD COMPLEX 30 MIN: CPT

## 2024-01-01 PROCEDURE — 80051 ELECTROLYTE PANEL: CPT

## 2024-01-01 PROCEDURE — 83970 ASSAY OF PARATHORMONE: CPT | Performed by: INTERNAL MEDICINE

## 2024-01-01 PROCEDURE — 36600 WITHDRAWAL OF ARTERIAL BLOOD: CPT

## 2024-01-01 PROCEDURE — 36415 COLL VENOUS BLD VENIPUNCTURE: CPT

## 2024-01-01 PROCEDURE — 82340 ASSAY OF CALCIUM IN URINE: CPT | Performed by: INTERNAL MEDICINE

## 2024-01-01 PROCEDURE — 82746 ASSAY OF FOLIC ACID SERUM: CPT | Performed by: STUDENT IN AN ORGANIZED HEALTH CARE EDUCATION/TRAINING PROGRAM

## 2024-01-01 PROCEDURE — 95816 EEG AWAKE AND DROWSY: CPT

## 2024-01-01 PROCEDURE — 80048 BASIC METABOLIC PNL TOTAL CA: CPT | Performed by: INTERNAL MEDICINE

## 2024-01-01 PROCEDURE — 93010 ELECTROCARDIOGRAM REPORT: CPT | Performed by: INTERNAL MEDICINE

## 2024-01-01 PROCEDURE — 25010000002 VANCOMYCIN 10 G RECONSTITUTED SOLUTION: Performed by: EMERGENCY MEDICINE

## 2024-01-01 PROCEDURE — P9612 CATHETERIZE FOR URINE SPEC: HCPCS

## 2024-01-01 PROCEDURE — 84155 ASSAY OF PROTEIN SERUM: CPT | Performed by: INTERNAL MEDICINE

## 2024-01-01 PROCEDURE — 93005 ELECTROCARDIOGRAM TRACING: CPT | Performed by: EMERGENCY MEDICINE

## 2024-01-01 PROCEDURE — 83521 IG LIGHT CHAINS FREE EACH: CPT | Performed by: INTERNAL MEDICINE

## 2024-01-01 PROCEDURE — 83880 ASSAY OF NATRIURETIC PEPTIDE: CPT | Performed by: EMERGENCY MEDICINE

## 2024-01-01 RX ORDER — LORAZEPAM 2 MG/ML
1 CONCENTRATE ORAL
Status: DISCONTINUED | OUTPATIENT
Start: 2024-01-01 | End: 2024-01-01 | Stop reason: HOSPADM

## 2024-01-01 RX ORDER — KETOROLAC TROMETHAMINE 15 MG/ML
15 INJECTION, SOLUTION INTRAMUSCULAR; INTRAVENOUS EVERY 6 HOURS PRN
Status: DISCONTINUED | OUTPATIENT
Start: 2024-01-01 | End: 2024-01-01 | Stop reason: HOSPADM

## 2024-01-01 RX ORDER — SCOLOPAMINE TRANSDERMAL SYSTEM 1 MG/1
1 PATCH, EXTENDED RELEASE TRANSDERMAL
Status: DISCONTINUED | OUTPATIENT
Start: 2024-01-01 | End: 2024-01-01 | Stop reason: HOSPADM

## 2024-01-01 RX ORDER — HALOPERIDOL 1 MG/1
1 TABLET ORAL EVERY 4 HOURS PRN
Status: DISCONTINUED | OUTPATIENT
Start: 2024-01-01 | End: 2024-01-01 | Stop reason: HOSPADM

## 2024-01-01 RX ORDER — HYDROMORPHONE HYDROCHLORIDE 2 MG/ML
1.5 INJECTION, SOLUTION INTRAMUSCULAR; INTRAVENOUS; SUBCUTANEOUS
Status: DISCONTINUED | OUTPATIENT
Start: 2024-01-01 | End: 2024-01-01 | Stop reason: HOSPADM

## 2024-01-01 RX ORDER — POTASSIUM CHLORIDE 7.45 MG/ML
10 INJECTION INTRAVENOUS
Status: COMPLETED | OUTPATIENT
Start: 2024-01-01 | End: 2024-01-01

## 2024-01-01 RX ORDER — DOCUSATE SODIUM 100 MG/1
100 CAPSULE, LIQUID FILLED ORAL 2 TIMES DAILY
COMMUNITY

## 2024-01-01 RX ORDER — LORAZEPAM 2 MG/ML
1 INJECTION INTRAMUSCULAR
Status: DISCONTINUED | OUTPATIENT
Start: 2024-01-01 | End: 2024-01-01 | Stop reason: HOSPADM

## 2024-01-01 RX ORDER — MORPHINE SULFATE 2 MG/ML
2 INJECTION, SOLUTION INTRAMUSCULAR; INTRAVENOUS
Status: DISCONTINUED | OUTPATIENT
Start: 2024-01-01 | End: 2024-01-01 | Stop reason: HOSPADM

## 2024-01-01 RX ORDER — DIPHENHYDRAMINE HYDROCHLORIDE 50 MG/ML
25 INJECTION INTRAMUSCULAR; INTRAVENOUS EVERY 6 HOURS PRN
Status: DISCONTINUED | OUTPATIENT
Start: 2024-01-01 | End: 2024-01-01 | Stop reason: HOSPADM

## 2024-01-01 RX ORDER — MORPHINE SULFATE 20 MG/ML
20 SOLUTION ORAL
Status: DISCONTINUED | OUTPATIENT
Start: 2024-01-01 | End: 2024-01-01 | Stop reason: HOSPADM

## 2024-01-01 RX ORDER — ACETAMINOPHEN 650 MG/1
650 SUPPOSITORY RECTAL EVERY 4 HOURS PRN
Status: DISCONTINUED | OUTPATIENT
Start: 2024-01-01 | End: 2024-01-01 | Stop reason: HOSPADM

## 2024-01-01 RX ORDER — LORAZEPAM 2 MG/ML
2 INJECTION INTRAMUSCULAR
Status: DISCONTINUED | OUTPATIENT
Start: 2024-01-01 | End: 2024-01-01 | Stop reason: HOSPADM

## 2024-01-01 RX ORDER — MORPHINE SULFATE 4 MG/ML
4 INJECTION, SOLUTION INTRAMUSCULAR; INTRAVENOUS
Status: DISCONTINUED | OUTPATIENT
Start: 2024-01-01 | End: 2024-01-01 | Stop reason: HOSPADM

## 2024-01-01 RX ORDER — VANCOMYCIN/0.9 % SOD CHLORIDE 1.5G/250ML
20 PLASTIC BAG, INJECTION (ML) INTRAVENOUS ONCE
Status: COMPLETED | OUTPATIENT
Start: 2024-01-01 | End: 2024-01-01

## 2024-01-01 RX ORDER — LORAZEPAM 2 MG/ML
0.5 INJECTION INTRAMUSCULAR
Status: DISCONTINUED | OUTPATIENT
Start: 2024-01-01 | End: 2024-01-01 | Stop reason: HOSPADM

## 2024-01-01 RX ORDER — LORAZEPAM 2 MG/ML
1 INJECTION INTRAMUSCULAR ONCE
Status: COMPLETED | OUTPATIENT
Start: 2024-01-01 | End: 2024-01-01

## 2024-01-01 RX ORDER — GLYCOPYRROLATE 0.2 MG/ML
0.4 INJECTION INTRAMUSCULAR; INTRAVENOUS
Status: DISCONTINUED | OUTPATIENT
Start: 2024-01-01 | End: 2024-01-01 | Stop reason: HOSPADM

## 2024-01-01 RX ORDER — MORPHINE SULFATE 20 MG/ML
10 SOLUTION ORAL
Status: DISCONTINUED | OUTPATIENT
Start: 2024-01-01 | End: 2024-01-01 | Stop reason: HOSPADM

## 2024-01-01 RX ORDER — LORAZEPAM 2 MG/ML
0.5 CONCENTRATE ORAL
Status: DISCONTINUED | OUTPATIENT
Start: 2024-01-01 | End: 2024-01-01 | Stop reason: HOSPADM

## 2024-01-01 RX ORDER — CYANOCOBALAMIN 1000 UG/ML
1000 INJECTION, SOLUTION INTRAMUSCULAR; SUBCUTANEOUS DAILY
Status: DISCONTINUED | OUTPATIENT
Start: 2024-01-01 | End: 2024-01-01

## 2024-01-01 RX ORDER — LORAZEPAM 2 MG/ML
2 CONCENTRATE ORAL
Status: DISCONTINUED | OUTPATIENT
Start: 2024-01-01 | End: 2024-01-01 | Stop reason: HOSPADM

## 2024-01-01 RX ORDER — DIPHENOXYLATE HCL/ATROPINE 2.5-.025MG
1 TABLET ORAL
Status: DISCONTINUED | OUTPATIENT
Start: 2024-01-01 | End: 2024-01-01 | Stop reason: HOSPADM

## 2024-01-01 RX ORDER — SODIUM CHLORIDE 0.9 % (FLUSH) 0.9 %
10 SYRINGE (ML) INJECTION EVERY 12 HOURS SCHEDULED
Status: DISCONTINUED | OUTPATIENT
Start: 2024-01-01 | End: 2024-01-01 | Stop reason: HOSPADM

## 2024-01-01 RX ORDER — BISACODYL 10 MG
10 SUPPOSITORY, RECTAL RECTAL 2 TIMES DAILY
Status: DISPENSED | OUTPATIENT
Start: 2024-01-01 | End: 2024-01-01

## 2024-01-01 RX ORDER — DEXTROSE MONOHYDRATE 25 G/50ML
25 INJECTION, SOLUTION INTRAVENOUS
Status: DISCONTINUED | OUTPATIENT
Start: 2024-01-01 | End: 2024-01-01

## 2024-01-01 RX ORDER — DEXTROSE MONOHYDRATE 50 MG/ML
125 INJECTION, SOLUTION INTRAVENOUS CONTINUOUS
Status: DISCONTINUED | OUTPATIENT
Start: 2024-01-01 | End: 2024-01-01

## 2024-01-01 RX ORDER — IBUPROFEN 600 MG/1
1 TABLET ORAL
Status: DISCONTINUED | OUTPATIENT
Start: 2024-01-01 | End: 2024-01-01

## 2024-01-01 RX ORDER — FENTANYL/ROPIVACAINE/NS/PF 2-625MCG/1
15 PLASTIC BAG, INJECTION (ML) EPIDURAL ONCE
Status: COMPLETED | OUTPATIENT
Start: 2024-01-01 | End: 2024-01-01

## 2024-01-01 RX ORDER — SODIUM CHLORIDE FOR INHALATION 7 %
4 VIAL, NEBULIZER (ML) INHALATION
Status: DISCONTINUED | OUTPATIENT
Start: 2024-01-01 | End: 2024-01-01

## 2024-01-01 RX ORDER — SODIUM CHLORIDE 0.9 % (FLUSH) 0.9 %
10 SYRINGE (ML) INJECTION AS NEEDED
Status: DISCONTINUED | OUTPATIENT
Start: 2024-01-01 | End: 2024-01-01 | Stop reason: HOSPADM

## 2024-01-01 RX ORDER — NYSTATIN 100000/ML
500000 SUSPENSION, ORAL (FINAL DOSE FORM) ORAL 4 TIMES DAILY
COMMUNITY

## 2024-01-01 RX ORDER — POTASSIUM CHLORIDE 1.5 G/1.58G
40 POWDER, FOR SOLUTION ORAL EVERY 4 HOURS
Status: DISCONTINUED | OUTPATIENT
Start: 2024-01-01 | End: 2024-01-01

## 2024-01-01 RX ORDER — ERGOCALCIFEROL 1.25 MG/1
50000 CAPSULE, LIQUID FILLED ORAL
Status: DISCONTINUED | OUTPATIENT
Start: 2024-01-01 | End: 2024-01-01

## 2024-01-01 RX ORDER — BISACODYL 5 MG/1
5 TABLET, DELAYED RELEASE ORAL DAILY PRN
Status: DISCONTINUED | OUTPATIENT
Start: 2024-01-01 | End: 2024-01-01

## 2024-01-01 RX ORDER — MORPHINE SULFATE 10 MG/ML
6 INJECTION INTRAMUSCULAR; INTRAVENOUS; SUBCUTANEOUS
Status: DISCONTINUED | OUTPATIENT
Start: 2024-01-01 | End: 2024-01-01 | Stop reason: HOSPADM

## 2024-01-01 RX ORDER — ACETAMINOPHEN 325 MG/1
650 TABLET ORAL EVERY 4 HOURS PRN
Status: DISCONTINUED | OUTPATIENT
Start: 2024-01-01 | End: 2024-01-01 | Stop reason: HOSPADM

## 2024-01-01 RX ORDER — IPRATROPIUM BROMIDE AND ALBUTEROL SULFATE 2.5; .5 MG/3ML; MG/3ML
3 SOLUTION RESPIRATORY (INHALATION)
Status: DISCONTINUED | OUTPATIENT
Start: 2024-01-01 | End: 2024-01-01

## 2024-01-01 RX ORDER — ENOXAPARIN SODIUM 100 MG/ML
40 INJECTION SUBCUTANEOUS NIGHTLY
Status: DISCONTINUED | OUTPATIENT
Start: 2024-01-01 | End: 2024-01-01

## 2024-01-01 RX ORDER — DIPHENHYDRAMINE HCL 25 MG
25 CAPSULE ORAL EVERY 6 HOURS PRN
Status: DISCONTINUED | OUTPATIENT
Start: 2024-01-01 | End: 2024-01-01 | Stop reason: HOSPADM

## 2024-01-01 RX ORDER — HALOPERIDOL 2 MG/ML
1 SOLUTION ORAL EVERY 4 HOURS PRN
Status: DISCONTINUED | OUTPATIENT
Start: 2024-01-01 | End: 2024-01-01 | Stop reason: HOSPADM

## 2024-01-01 RX ORDER — GLYCOPYRROLATE 0.2 MG/ML
0.2 INJECTION INTRAMUSCULAR; INTRAVENOUS
Status: DISCONTINUED | OUTPATIENT
Start: 2024-01-01 | End: 2024-01-01 | Stop reason: HOSPADM

## 2024-01-01 RX ORDER — POLYETHYLENE GLYCOL 3350 17 G/17G
17 POWDER, FOR SOLUTION ORAL DAILY PRN
Status: DISCONTINUED | OUTPATIENT
Start: 2024-01-01 | End: 2024-01-01

## 2024-01-01 RX ORDER — MORPHINE SULFATE 20 MG/ML
5 SOLUTION ORAL
Status: DISCONTINUED | OUTPATIENT
Start: 2024-01-01 | End: 2024-01-01 | Stop reason: HOSPADM

## 2024-01-01 RX ORDER — SODIUM CHLORIDE 0.9 % (FLUSH) 0.9 %
10 SYRINGE (ML) INJECTION AS NEEDED
Status: DISCONTINUED | OUTPATIENT
Start: 2024-01-01 | End: 2024-01-01

## 2024-01-01 RX ORDER — BISACODYL 10 MG
10 SUPPOSITORY, RECTAL RECTAL DAILY PRN
Status: DISCONTINUED | OUTPATIENT
Start: 2024-01-01 | End: 2024-01-01

## 2024-01-01 RX ORDER — HALOPERIDOL 5 MG/ML
1 INJECTION INTRAMUSCULAR EVERY 4 HOURS PRN
Status: DISCONTINUED | OUTPATIENT
Start: 2024-01-01 | End: 2024-01-01 | Stop reason: HOSPADM

## 2024-01-01 RX ORDER — AMOXICILLIN 250 MG
2 CAPSULE ORAL 2 TIMES DAILY
Status: DISCONTINUED | OUTPATIENT
Start: 2024-01-01 | End: 2024-01-01

## 2024-01-01 RX ORDER — MORPHINE SULFATE 2 MG/ML
4 INJECTION, SOLUTION INTRAMUSCULAR; INTRAVENOUS
Status: DISCONTINUED | OUTPATIENT
Start: 2024-01-01 | End: 2024-01-01 | Stop reason: HOSPADM

## 2024-01-01 RX ORDER — POTASSIUM CHLORIDE 1.5 G/1.58G
40 POWDER, FOR SOLUTION ORAL EVERY 4 HOURS
Status: COMPLETED | OUTPATIENT
Start: 2024-01-01 | End: 2024-01-01

## 2024-01-01 RX ORDER — ACETAMINOPHEN 160 MG/5ML
650 SOLUTION ORAL EVERY 4 HOURS PRN
Status: DISCONTINUED | OUTPATIENT
Start: 2024-01-01 | End: 2024-01-01 | Stop reason: HOSPADM

## 2024-01-01 RX ORDER — CHOLECALCIFEROL (VITAMIN D3) 25 MCG
5000 TABLET ORAL DAILY
Status: DISCONTINUED | OUTPATIENT
Start: 2024-01-01 | End: 2024-01-01

## 2024-01-01 RX ORDER — ONDANSETRON 4 MG/1
4 TABLET, ORALLY DISINTEGRATING ORAL EVERY 6 HOURS PRN
Status: DISCONTINUED | OUTPATIENT
Start: 2024-01-01 | End: 2024-01-01 | Stop reason: HOSPADM

## 2024-01-01 RX ORDER — SODIUM CHLORIDE 450 MG/100ML
150 INJECTION, SOLUTION INTRAVENOUS CONTINUOUS
Status: DISCONTINUED | OUTPATIENT
Start: 2024-01-01 | End: 2024-01-01

## 2024-01-01 RX ORDER — HYDROMORPHONE HYDROCHLORIDE 1 MG/ML
0.5 INJECTION, SOLUTION INTRAMUSCULAR; INTRAVENOUS; SUBCUTANEOUS
Status: DISCONTINUED | OUTPATIENT
Start: 2024-01-01 | End: 2024-01-01 | Stop reason: HOSPADM

## 2024-01-01 RX ORDER — ALUMINA, MAGNESIA, AND SIMETHICONE 2400; 2400; 240 MG/30ML; MG/30ML; MG/30ML
15 SUSPENSION ORAL EVERY 6 HOURS PRN
Status: DISCONTINUED | OUTPATIENT
Start: 2024-01-01 | End: 2024-01-01

## 2024-01-01 RX ORDER — NICOTINE POLACRILEX 4 MG
15 LOZENGE BUCCAL
Status: DISCONTINUED | OUTPATIENT
Start: 2024-01-01 | End: 2024-01-01

## 2024-01-01 RX ORDER — NITROGLYCERIN 0.4 MG/1
0.4 TABLET SUBLINGUAL
Status: DISCONTINUED | OUTPATIENT
Start: 2024-01-01 | End: 2024-01-01

## 2024-01-01 RX ORDER — SODIUM CHLORIDE 9 MG/ML
40 INJECTION, SOLUTION INTRAVENOUS AS NEEDED
Status: DISCONTINUED | OUTPATIENT
Start: 2024-01-01 | End: 2024-01-01 | Stop reason: HOSPADM

## 2024-01-01 RX ORDER — ONDANSETRON 2 MG/ML
4 INJECTION INTRAMUSCULAR; INTRAVENOUS EVERY 6 HOURS PRN
Status: DISCONTINUED | OUTPATIENT
Start: 2024-01-01 | End: 2024-01-01 | Stop reason: HOSPADM

## 2024-01-01 RX ORDER — NOREPINEPHRINE BITARTRATE 0.03 MG/ML
.02-.3 INJECTION, SOLUTION INTRAVENOUS
Status: DISCONTINUED | OUTPATIENT
Start: 2024-01-01 | End: 2024-01-01

## 2024-01-01 RX ADMIN — Medication 10 ML: at 21:03

## 2024-01-01 RX ADMIN — IPRATROPIUM BROMIDE AND ALBUTEROL SULFATE 3 ML: .5; 3 SOLUTION RESPIRATORY (INHALATION) at 14:11

## 2024-01-01 RX ADMIN — INSULIN HUMAN 2 UNITS: 100 INJECTION, SOLUTION PARENTERAL at 03:42

## 2024-01-01 RX ADMIN — POTASSIUM PHOSPHATE, MONOBASIC POTASSIUM PHOSPHATE, DIBASIC 15 MMOL: 224; 236 INJECTION, SOLUTION, CONCENTRATE INTRAVENOUS at 09:38

## 2024-01-01 RX ADMIN — Medication 10 ML: at 08:00

## 2024-01-01 RX ADMIN — GLYCOPYRROLATE 0.4 MG: 0.2 INJECTION INTRAMUSCULAR; INTRAVENOUS at 21:24

## 2024-01-01 RX ADMIN — POTASSIUM CHLORIDE 10 MEQ: 7.46 INJECTION, SOLUTION INTRAVENOUS at 10:36

## 2024-01-01 RX ADMIN — POTASSIUM CHLORIDE 40 MEQ: 1.5 FOR SOLUTION ORAL at 15:17

## 2024-01-01 RX ADMIN — Medication 10 ML: at 09:38

## 2024-01-01 RX ADMIN — ENOXAPARIN SODIUM 40 MG: 100 INJECTION SUBCUTANEOUS at 20:11

## 2024-01-01 RX ADMIN — CYANOCOBALAMIN 1000 MCG: 1000 INJECTION, SOLUTION INTRAMUSCULAR; SUBCUTANEOUS at 17:12

## 2024-01-01 RX ADMIN — SODIUM PHOSPHATE, MONOBASIC, MONOHYDRATE AND SODIUM PHOSPHATE, DIBASIC, ANHYDROUS 15 MMOL: 276; 142 INJECTION, SOLUTION INTRAVENOUS at 23:54

## 2024-01-01 RX ADMIN — FOLIC ACID 1 MG: 5 INJECTION, SOLUTION INTRAMUSCULAR; INTRAVENOUS; SUBCUTANEOUS at 09:15

## 2024-01-01 RX ADMIN — SODIUM CHLORIDE 150 ML/HR: 4.5 INJECTION, SOLUTION INTRAVENOUS at 10:28

## 2024-01-01 RX ADMIN — POTASSIUM CHLORIDE 10 MEQ: 7.46 INJECTION, SOLUTION INTRAVENOUS at 14:30

## 2024-01-01 RX ADMIN — Medication 10 ML: at 21:23

## 2024-01-01 RX ADMIN — POTASSIUM CHLORIDE 10 MEQ: 7.46 INJECTION, SOLUTION INTRAVENOUS at 00:42

## 2024-01-01 RX ADMIN — CEFEPIME 2000 MG: 2 INJECTION, POWDER, FOR SOLUTION INTRAVENOUS at 00:42

## 2024-01-01 RX ADMIN — Medication 10 ML: at 08:49

## 2024-01-01 RX ADMIN — SENNOSIDES AND DOCUSATE SODIUM 2 TABLET: 50; 8.6 TABLET ORAL at 20:11

## 2024-01-01 RX ADMIN — SODIUM PHOSPHATE, MONOBASIC, MONOHYDRATE AND SODIUM PHOSPHATE, DIBASIC, ANHYDROUS 15 MMOL: 276; 142 INJECTION, SOLUTION INTRAVENOUS at 16:02

## 2024-01-01 RX ADMIN — IPRATROPIUM BROMIDE AND ALBUTEROL SULFATE 3 ML: .5; 3 SOLUTION RESPIRATORY (INHALATION) at 10:25

## 2024-01-01 RX ADMIN — POTASSIUM CHLORIDE 10 MEQ: 7.46 INJECTION, SOLUTION INTRAVENOUS at 12:31

## 2024-01-01 RX ADMIN — FOLIC ACID 1 MG: 5 INJECTION, SOLUTION INTRAMUSCULAR; INTRAVENOUS; SUBCUTANEOUS at 08:48

## 2024-01-01 RX ADMIN — IPRATROPIUM BROMIDE AND ALBUTEROL SULFATE 3 ML: .5; 3 SOLUTION RESPIRATORY (INHALATION) at 15:18

## 2024-01-01 RX ADMIN — POTASSIUM, SODIUM PHOSPHATES 280 MG-160 MG-250 MG ORAL POWDER PACKET 2 PACKET: POWDER IN PACKET at 11:37

## 2024-01-01 RX ADMIN — SODIUM CHLORIDE 500 ML: 9 INJECTION, SOLUTION INTRAVENOUS at 15:39

## 2024-01-01 RX ADMIN — POTASSIUM CHLORIDE: 2 INJECTION, SOLUTION, CONCENTRATE INTRAVENOUS at 08:52

## 2024-01-01 RX ADMIN — GLYCOPYRROLATE 0.4 MG: 0.2 INJECTION INTRAMUSCULAR; INTRAVENOUS at 14:47

## 2024-01-01 RX ADMIN — LORAZEPAM 1 MG: 2 INJECTION INTRAMUSCULAR; INTRAVENOUS at 22:09

## 2024-01-01 RX ADMIN — POTASSIUM CHLORIDE 10 MEQ: 7.46 INJECTION, SOLUTION INTRAVENOUS at 01:42

## 2024-01-01 RX ADMIN — POTASSIUM CHLORIDE 10 MEQ: 7.46 INJECTION, SOLUTION INTRAVENOUS at 18:04

## 2024-01-01 RX ADMIN — Medication 10 ML: at 16:10

## 2024-01-01 RX ADMIN — SODIUM CHLORIDE 125 ML/HR: 4.5 INJECTION, SOLUTION INTRAVENOUS at 10:17

## 2024-01-01 RX ADMIN — DEXTROSE MONOHYDRATE 125 ML/HR: 50 INJECTION, SOLUTION INTRAVENOUS at 19:03

## 2024-01-01 RX ADMIN — MORPHINE SULFATE 4 MG: 2 INJECTION, SOLUTION INTRAMUSCULAR; INTRAVENOUS at 18:11

## 2024-01-01 RX ADMIN — POTASSIUM CHLORIDE 10 MEQ: 7.46 INJECTION, SOLUTION INTRAVENOUS at 10:31

## 2024-01-01 RX ADMIN — SENNOSIDES AND DOCUSATE SODIUM 2 TABLET: 50; 8.6 TABLET ORAL at 16:10

## 2024-01-01 RX ADMIN — MORPHINE SULFATE 4 MG: 4 INJECTION, SOLUTION INTRAMUSCULAR; INTRAVENOUS at 00:35

## 2024-01-01 RX ADMIN — MORPHINE SULFATE 4 MG: 4 INJECTION, SOLUTION INTRAMUSCULAR; INTRAVENOUS at 21:24

## 2024-01-01 RX ADMIN — POTASSIUM CHLORIDE 10 MEQ: 7.46 INJECTION, SOLUTION INTRAVENOUS at 23:08

## 2024-01-01 RX ADMIN — POTASSIUM PHOSPHATE, MONOBASIC POTASSIUM PHOSPHATE, DIBASIC 15 MMOL: 224; 236 INJECTION, SOLUTION, CONCENTRATE INTRAVENOUS at 11:29

## 2024-01-01 RX ADMIN — GADOBENATE DIMEGLUMINE 17 ML: 529 INJECTION, SOLUTION INTRAVENOUS at 22:48

## 2024-01-01 RX ADMIN — Medication 10 ML: at 10:12

## 2024-01-01 RX ADMIN — POTASSIUM CHLORIDE 10 MEQ: 7.46 INJECTION, SOLUTION INTRAVENOUS at 13:03

## 2024-01-01 RX ADMIN — IPRATROPIUM BROMIDE AND ALBUTEROL SULFATE 3 ML: .5; 3 SOLUTION RESPIRATORY (INHALATION) at 06:41

## 2024-01-01 RX ADMIN — POTASSIUM CHLORIDE 40 MEQ: 1.5 FOR SOLUTION ORAL at 21:02

## 2024-01-01 RX ADMIN — POTASSIUM CHLORIDE 40 MEQ: 1.5 FOR SOLUTION ORAL at 09:16

## 2024-01-01 RX ADMIN — CEFEPIME 2000 MG: 2 INJECTION, POWDER, FOR SOLUTION INTRAVENOUS at 11:27

## 2024-01-01 RX ADMIN — Medication 10 ML: at 22:23

## 2024-01-01 RX ADMIN — CEFEPIME 2000 MG: 2 INJECTION, POWDER, FOR SOLUTION INTRAVENOUS at 15:53

## 2024-01-01 RX ADMIN — CEFEPIME 2000 MG: 2 INJECTION, POWDER, FOR SOLUTION INTRAVENOUS at 19:15

## 2024-01-01 RX ADMIN — Medication 4 ML: at 06:40

## 2024-01-01 RX ADMIN — VANCOMYCIN HYDROCHLORIDE 1500 MG: 10 INJECTION, POWDER, LYOPHILIZED, FOR SOLUTION INTRAVENOUS at 16:54

## 2024-01-01 RX ADMIN — Medication 10 ML: at 20:10

## 2024-01-01 RX ADMIN — POTASSIUM CHLORIDE 10 MEQ: 7.46 INJECTION, SOLUTION INTRAVENOUS at 02:42

## 2024-01-01 RX ADMIN — Medication 10 ML: at 09:17

## 2024-01-01 RX ADMIN — SENNOSIDES AND DOCUSATE SODIUM 2 TABLET: 50; 8.6 TABLET ORAL at 21:00

## 2024-01-01 RX ADMIN — GLYCOPYRROLATE 0.4 MG: 0.2 INJECTION INTRAMUSCULAR; INTRAVENOUS at 00:36

## 2024-01-01 RX ADMIN — Medication 5000 UNITS: at 09:16

## 2024-01-01 RX ADMIN — Medication 10 ML: at 08:53

## 2024-01-01 RX ADMIN — ACETAMINOPHEN 325MG 650 MG: 325 TABLET ORAL at 13:55

## 2024-01-01 RX ADMIN — SODIUM PHOSPHATE, MONOBASIC, MONOHYDRATE AND SODIUM PHOSPHATE, DIBASIC, ANHYDROUS 15 MMOL: 276; 142 INJECTION, SOLUTION INTRAVENOUS at 12:49

## 2024-01-01 RX ADMIN — POTASSIUM CHLORIDE 40 MEQ: 1.5 FOR SOLUTION ORAL at 11:37

## 2024-01-01 RX ADMIN — IPRATROPIUM BROMIDE AND ALBUTEROL SULFATE 3 ML: .5; 3 SOLUTION RESPIRATORY (INHALATION) at 10:52

## 2024-01-01 RX ADMIN — Medication 4 ML: at 08:10

## 2024-01-01 RX ADMIN — CYANOCOBALAMIN 1000 MCG: 1000 INJECTION, SOLUTION INTRAMUSCULAR; SUBCUTANEOUS at 09:16

## 2024-01-01 RX ADMIN — IPRATROPIUM BROMIDE AND ALBUTEROL SULFATE 3 ML: .5; 3 SOLUTION RESPIRATORY (INHALATION) at 19:39

## 2024-01-01 RX ADMIN — SCOPALAMINE 1 PATCH: 1 PATCH, EXTENDED RELEASE TRANSDERMAL at 00:35

## 2024-01-01 RX ADMIN — POTASSIUM CHLORIDE 10 MEQ: 7.46 INJECTION, SOLUTION INTRAVENOUS at 15:43

## 2024-01-01 RX ADMIN — POTASSIUM CHLORIDE 10 MEQ: 7.46 INJECTION, SOLUTION INTRAVENOUS at 23:14

## 2024-01-01 RX ADMIN — POTASSIUM CHLORIDE 10 MEQ: 7.46 INJECTION, SOLUTION INTRAVENOUS at 16:38

## 2024-01-01 RX ADMIN — DEXTROSE MONOHYDRATE 125 ML/HR: 50 INJECTION, SOLUTION INTRAVENOUS at 11:28

## 2024-01-01 RX ADMIN — SENNOSIDES AND DOCUSATE SODIUM 2 TABLET: 50; 8.6 TABLET ORAL at 09:41

## 2024-01-01 RX ADMIN — IPRATROPIUM BROMIDE AND ALBUTEROL SULFATE 3 ML: .5; 3 SOLUTION RESPIRATORY (INHALATION) at 08:11

## 2024-01-01 RX ADMIN — BISACODYL 10 MG: 10 SUPPOSITORY RECTAL at 10:34

## 2024-01-01 RX ADMIN — Medication 5000 UNITS: at 20:11

## 2024-01-01 RX ADMIN — CEFEPIME 2000 MG: 2 INJECTION, POWDER, FOR SOLUTION INTRAVENOUS at 02:42

## 2024-01-01 RX ADMIN — SODIUM CHLORIDE 125 ML/HR: 4.5 INJECTION, SOLUTION INTRAVENOUS at 01:41

## 2024-01-01 RX ADMIN — Medication 5000 UNITS: at 08:48

## 2024-01-01 RX ADMIN — MORPHINE SULFATE 2 MG: 2 INJECTION, SOLUTION INTRAMUSCULAR; INTRAVENOUS at 14:47

## 2024-01-01 RX ADMIN — ENOXAPARIN SODIUM 40 MG: 100 INJECTION SUBCUTANEOUS at 21:02

## 2024-01-01 RX ADMIN — FOLIC ACID 1 MG: 5 INJECTION, SOLUTION INTRAMUSCULAR; INTRAVENOUS; SUBCUTANEOUS at 21:28

## 2024-01-01 RX ADMIN — SODIUM CHLORIDE 125 ML/HR: 4.5 INJECTION, SOLUTION INTRAVENOUS at 15:41

## 2024-01-01 RX ADMIN — POTASSIUM CHLORIDE: 2 INJECTION, SOLUTION, CONCENTRATE INTRAVENOUS at 23:54

## 2024-01-01 RX ADMIN — Medication 10 ML: at 21:29

## 2024-01-01 RX ADMIN — CYANOCOBALAMIN 1000 MCG: 1000 INJECTION, SOLUTION INTRAMUSCULAR; SUBCUTANEOUS at 08:49

## 2024-01-01 RX ADMIN — GLYCOPYRROLATE 0.2 MG: 0.2 INJECTION INTRAMUSCULAR; INTRAVENOUS at 13:23

## 2024-01-01 RX ADMIN — Medication 10 ML: at 09:34

## 2024-01-01 RX ADMIN — LORAZEPAM 0.5 MG: 2 INJECTION INTRAMUSCULAR; INTRAVENOUS at 00:35

## 2024-01-01 RX ADMIN — Medication 4 ML: at 19:42

## 2024-01-01 RX ADMIN — Medication 0.02 MCG/KG/MIN: at 03:38

## 2024-01-01 RX ADMIN — ACETAMINOPHEN 650 MG: 650 SUPPOSITORY RECTAL at 20:04

## 2024-01-01 RX ADMIN — IPRATROPIUM BROMIDE AND ALBUTEROL SULFATE 3 ML: .5; 3 SOLUTION RESPIRATORY (INHALATION) at 19:18

## 2024-01-01 RX ADMIN — POTASSIUM CHLORIDE 10 MEQ: 7.46 INJECTION, SOLUTION INTRAVENOUS at 13:58

## 2024-01-01 RX ADMIN — IPRATROPIUM BROMIDE AND ALBUTEROL SULFATE 3 ML: .5; 3 SOLUTION RESPIRATORY (INHALATION) at 06:59

## 2024-01-01 RX ADMIN — DEXTROSE MONOHYDRATE 125 ML/HR: 50 INJECTION, SOLUTION INTRAVENOUS at 19:58

## 2024-01-01 RX ADMIN — Medication 4 ML: at 07:00

## 2024-01-01 RX ADMIN — ENOXAPARIN SODIUM 40 MG: 100 INJECTION SUBCUTANEOUS at 21:28

## 2024-01-01 RX ADMIN — SODIUM CHLORIDE, POTASSIUM CHLORIDE, SODIUM LACTATE AND CALCIUM CHLORIDE 1000 ML: 600; 310; 30; 20 INJECTION, SOLUTION INTRAVENOUS at 18:56

## 2024-01-01 RX ADMIN — MORPHINE SULFATE 2 MG: 2 INJECTION, SOLUTION INTRAMUSCULAR; INTRAVENOUS at 13:51

## 2024-01-01 RX ADMIN — POTASSIUM CHLORIDE 10 MEQ: 7.46 INJECTION, SOLUTION INTRAVENOUS at 01:09

## 2024-01-01 RX ADMIN — Medication 10 ML: at 21:00

## 2024-01-01 RX ADMIN — POTASSIUM CHLORIDE 10 MEQ: 7.46 INJECTION, SOLUTION INTRAVENOUS at 11:28

## 2024-01-01 RX ADMIN — SODIUM PHOSPHATE, MONOBASIC, MONOHYDRATE AND SODIUM PHOSPHATE, DIBASIC, ANHYDROUS 15 MMOL: 276; 142 INJECTION, SOLUTION INTRAVENOUS at 08:53

## 2024-01-01 RX ADMIN — POTASSIUM CHLORIDE 10 MEQ: 7.46 INJECTION, SOLUTION INTRAVENOUS at 00:16

## 2024-01-01 RX ADMIN — Medication 4 ML: at 19:21

## 2024-01-01 RX ADMIN — POTASSIUM PHOSPHATE, MONOBASIC AND POTASSIUM PHOSPHATE, DIBASIC 15 MMOL: 224; 236 INJECTION, SOLUTION, CONCENTRATE INTRAVENOUS at 21:00

## 2024-01-01 RX ADMIN — Medication 10 ML: at 21:04

## 2024-01-01 RX ADMIN — SODIUM CHLORIDE 500 ML: 9 INJECTION, SOLUTION INTRAVENOUS at 14:46

## 2024-01-01 RX ADMIN — POTASSIUM CHLORIDE: 2 INJECTION, SOLUTION, CONCENTRATE INTRAVENOUS at 21:34

## 2024-01-01 RX ADMIN — POTASSIUM CHLORIDE 40 MEQ: 1.5 FOR SOLUTION ORAL at 00:41

## 2024-01-01 RX ADMIN — LORAZEPAM 0.5 MG: 2 INJECTION INTRAMUSCULAR; INTRAVENOUS at 21:23

## 2024-01-01 RX ADMIN — INSULIN HUMAN 2 UNITS: 100 INJECTION, SOLUTION PARENTERAL at 07:21

## 2024-01-01 RX ADMIN — SENNOSIDES AND DOCUSATE SODIUM 2 TABLET: 50; 8.6 TABLET ORAL at 21:52

## 2024-01-01 RX ADMIN — GLYCOPYRROLATE 0.4 MG: 0.2 INJECTION INTRAMUSCULAR; INTRAVENOUS at 18:11

## 2024-01-01 RX ADMIN — ACETAMINOPHEN 325MG 650 MG: 325 TABLET ORAL at 20:11

## 2024-01-01 RX ADMIN — DEXTROSE MONOHYDRATE 125 ML/HR: 50 INJECTION, SOLUTION INTRAVENOUS at 11:29

## 2024-01-01 RX ADMIN — SENNOSIDES AND DOCUSATE SODIUM 2 TABLET: 50; 8.6 TABLET ORAL at 09:16

## 2024-01-01 RX ADMIN — ACETAMINOPHEN 325MG 650 MG: 325 TABLET ORAL at 00:41

## 2024-08-15 PROBLEM — E87.0 HYPERNATREMIA: Status: ACTIVE | Noted: 2024-01-01

## 2024-08-15 NOTE — NURSING NOTE
Nursing report ED to floor  Mimi Pinto  73 y.o.  female    HPI :  HPI (Adult)  Stated Reason for Visit: abn lab  History Obtained From: EMS    Chief Complaint  Chief Complaint   Patient presents with    Abnormal Lab       Admitting doctor:   Larry Chen MD    Admitting diagnosis:   The primary encounter diagnosis was Septic shock. A diagnosis of Hypernatremia was also pertinent to this visit.    Code status:   Current Code Status       Date Active Code Status Order ID Comments User Context       Not on file            Allergies:   Penicillins and Sulfa antibiotics    Isolation:   No active isolations    Intake and Output    Intake/Output Summary (Last 24 hours) at 8/15/2024 1749  Last data filed at 8/15/2024 1655  Gross per 24 hour   Intake 1100 ml   Output --   Net 1100 ml       Weight:       08/15/24  1621   Weight: 70.1 kg (154 lb 9.6 oz)       Most recent vitals:   Vitals:    08/15/24 1501 08/15/24 1509 08/15/24 1516 08/15/24 1621   BP: 116/72  110/79    Pulse: 96 90 93    Resp:       Temp:       TempSrc:       SpO2:  96% 97%    Weight:    70.1 kg (154 lb 9.6 oz)       Active LDAs/IV Access:   Lines, Drains & Airways       Active LDAs       Name Placement date Placement time Site Days    Peripheral IV 08/15/24 1443 Anterior;Right Forearm 08/15/24  1443  Forearm  less than 1    Peripheral IV 08/15/24 1444 Anterior;Left;Upper Arm 08/15/24  1444  Arm  less than 1                    Labs (abnormal labs have a star):   Labs Reviewed   COMPREHENSIVE METABOLIC PANEL - Abnormal; Notable for the following components:       Result Value    Glucose 170 (*)     BUN 30 (*)     Creatinine 1.28 (*)     Sodium 164 (*)     Chloride 126 (*)     ALT (SGPT) 52 (*)     AST (SGOT) 46 (*)     eGFR 44.6 (*)     All other components within normal limits    Narrative:     GFR Normal >60  Chronic Kidney Disease <60  Kidney Failure <15    The GFR formula is only valid for adults with stable renal function between ages 18 and 70.  "  PROTIME-INR - Abnormal; Notable for the following components:    Protime 15.8 (*)     INR 1.24 (*)     All other components within normal limits   URINALYSIS W/ MICROSCOPIC IF INDICATED (NO CULTURE) - Abnormal; Notable for the following components:    Color, UA Dark Yellow (*)     Ketones, UA Trace (*)     Protein, UA 30 mg/dL (1+) (*)     Leuk Esterase, UA Small (1+) (*)     Nitrite, UA Positive (*)     All other components within normal limits   TROPONIN - Abnormal; Notable for the following components:    HS Troponin T 30 (*)     All other components within normal limits    Narrative:     High Sensitive Troponin T Reference Range:  <14.0 ng/L- Negative Female for AMI  <22.0 ng/L- Negative Male for AMI  >=14 - Abnormal Female indicating possible myocardial injury.  >=22 - Abnormal Male indicating possible myocardial injury.   Clinicians would have to utilize clinical acumen, EKG, Troponin, and serial changes to determine if it is an Acute Myocardial Infarction or myocardial injury due to an underlying chronic condition.        LACTIC ACID, PLASMA - Abnormal; Notable for the following components:    Lactate 4.1 (*)     All other components within normal limits   PROCALCITONIN - Abnormal; Notable for the following components:    Procalcitonin 0.40 (*)     All other components within normal limits    Narrative:     As a Marker for Sepsis (Non-Neonates):    1. <0.5 ng/mL represents a low risk of severe sepsis and/or septic shock.  2. >2 ng/mL represents a high risk of severe sepsis and/or septic shock.    As a Marker for Lower Respiratory Tract Infections that require antibiotic therapy:    PCT on Admission    Antibiotic Therapy       6-12 Hrs later    >0.5                Strongly Recommended  >0.25 - <0.5        Recommended   0.1 - 0.25          Discouraged              Remeasure/reassess PCT  <0.1                Strongly Discouraged     Remeasure/reassess PCT    As 28 day mortality risk marker: \"Change in " "Procalcitonin Result\" (>80% or <=80%) if Day 0 (or Day 1) and Day 4 values are available. Refer to [email or web address]    Change in PCT <=80%  A decrease of PCT levels below or equal to 80% defines a positive change in PCT test result representing a higher risk for 28-day all-cause mortality of patients diagnosed with severe sepsis for septic shock.    Change in PCT >80%  A decrease of PCT levels of more than 80% defines a negative change in PCT result representing a lower risk for 28-day all-cause mortality of patients diagnosed with severe sepsis or septic shock.      MAGNESIUM - Abnormal; Notable for the following components:    Magnesium 2.9 (*)     All other components within normal limits   CBC WITH AUTO DIFFERENTIAL - Abnormal; Notable for the following components:    WBC 21.24 (*)     Neutrophil % 85.7 (*)     Lymphocyte % 6.1 (*)     Eosinophil % 0.0 (*)     Immature Grans % 1.4 (*)     Neutrophils, Absolute 18.18 (*)     Monocytes, Absolute 1.41 (*)     Immature Grans, Absolute 0.30 (*)     nRBC 0.9 (*)     All other components within normal limits   URINALYSIS, MICROSCOPIC ONLY - Abnormal; Notable for the following components:    Squamous Epithelial Cells, UA 3-6 (*)     Yeast, UA Present (*)     All other components within normal limits   BLOOD GAS, VENOUS - Abnormal; Notable for the following components:    pH, Venous 7.426 (*)     pCO2, Venous 40.4 (*)     pO2, Venous 31.1 (*)     All other components within normal limits   ELECTROLYTES + H&H - Abnormal; Notable for the following components:    Sodium 168 (*)     Ionized Calcium 1.23 (*)     Glucose 158 (*)     All other components within normal limits   APTT - Normal   LIPASE - Normal   BNP (IN-HOUSE) - Normal    Narrative:     This assay is used as an aid in the diagnosis of individuals suspected of having heart failure. It can be used as an aid in the diagnosis of acute decompensated heart failure (ADHF) in patients presenting with " signs and symptoms of ADHF to the emergency department (ED). In addition, NT-proBNP of <300 pg/mL indicates ADHF is not likely.    Age Range Result Interpretation  NT-proBNP Concentration (pg/mL:      <50             Positive            >450                   Gray                 300-450                    Negative             <300    50-75           Positive            >900                  Gray                300-900                  Negative            <300      >75             Positive            >1800                  Gray                300-1800                  Negative            <300   CK - Normal   TSH - Normal   PHOSPHORUS - Normal   BLOOD CULTURE   BLOOD CULTURE   BLOOD GAS, VENOUS   LACTIC ACID, REFLEX   HIGH SENSITIVITIY TROPONIN T 2HR   POC ELECTROLYTE PANEL   POC LACTATE   POCT GLUCOSE FINGERSTICK   POCT GLUCOSE FINGERSTICK   POCT GLUCOSE FINGERSTICK   POCT GLUCOSE FINGERSTICK   CBC AND DIFFERENTIAL    Narrative:     The following orders were created for panel order CBC & Differential.  Procedure                               Abnormality         Status                     ---------                               -----------         ------                     CBC Auto Differential[[id number]]        Abnormal            Final result                 Please view results for these tests on the individual orders.       EKG:   ECG 12 Lead Tachycardia   Preliminary Result   HEART RATE=94  bpm   RR Eqbnnuiz=841  ms   TX Kekyalxk=190  ms   P Horizontal Axis=31  deg   P Front Axis=71  deg   QRSD Interval=60  ms   QT Uvylhepd=421  ms   VVlM=480  ms   QRS Axis=64  deg   T Wave Axis=72  deg   - ABNORMAL ECG -   Sinus rhythm   Borderline short TX interval   Nonspecific T abnrm, anterolateral leads   Prolonged QT interval   Date and Time of Study:2024-08-15 15:15:20          Meds given in ED:   Medications   sodium chloride 0.9 % flush 10 mL (has no administration in time range)   vancomycin IVPB 1500 mg in 0.9% NaCl  (Premix) 500 mL (1,500 mg Intravenous New Bag 8/15/24 1654)   sodium chloride 0.45 % infusion (125 mL/hr Intravenous New Bag 8/15/24 1541)   cefepime 2000 mg IVPB in 100 mL NS (MBP) (has no administration in time range)   lactated ringers bolus 1,000 mL (has no administration in time range)   nitroglycerin (NITROSTAT) SL tablet 0.4 mg (has no administration in time range)   sodium chloride 0.9 % flush 10 mL (has no administration in time range)   sodium chloride 0.9 % flush 10 mL (has no administration in time range)   sodium chloride 0.9 % infusion 40 mL (has no administration in time range)   aluminum-magnesium hydroxide-simethicone (MAALOX MAX) 400-400-40 MG/5ML suspension 15 mL (has no administration in time range)   sennosides-docusate (PERICOLACE) 8.6-50 MG per tablet 2 tablet (has no administration in time range)     And   polyethylene glycol (MIRALAX) packet 17 g (has no administration in time range)     And   bisacodyl (DULCOLAX) EC tablet 5 mg (has no administration in time range)     And   bisacodyl (DULCOLAX) suppository 10 mg (has no administration in time range)   acetaminophen (TYLENOL) tablet 650 mg (has no administration in time range)     Or   acetaminophen (TYLENOL) suppository 650 mg (has no administration in time range)   ondansetron ODT (ZOFRAN-ODT) disintegrating tablet 4 mg (has no administration in time range)     Or   ondansetron (ZOFRAN) injection 4 mg (has no administration in time range)   Potassium Replacement - Follow Nurse / BPA Driven Protocol (has no administration in time range)   Magnesium Standard Dose Replacement - Follow Nurse / BPA Driven Protocol (has no administration in time range)   Phosphorus Replacement - Follow Nurse / BPA Driven Protocol (has no administration in time range)   Calcium Replacement - Follow Nurse / BPA Driven Protocol (has no administration in time range)   sodium chloride 0.9 % bolus 500 mL (0 mL Intravenous Stopped 8/15/24 1516)   cefepime 2000 mg IVPB  in 100 mL NS (MBP) (0 mg Intravenous Stopped 8/15/24 1654)   sodium chloride 0.9 % bolus 500 mL (0 mL Intravenous Stopped 8/15/24 1655)       Imaging results:  XR Chest 1 View    Result Date: 8/15/2024  As described.  This report was finalized on 8/15/2024 3:44 PM by Dr. Wesley Jasmine M.D on Workstation: [email or web address]       Ambulatory status:   - br    Social issues:   Social History     Socioeconomic History    Marital status: Single       Peripheral Neurovascular  Peripheral Neurovascular (Adult)  Peripheral Neurovascular WDL: WDL    Neuro Cognitive  Neuro Cognitive (Adult)  Cognitive/Neuro/Behavioral WDL: .WDL except, level of consciousness (nonverbal, baseline)  Level of Consciousness: Alert    Learning  Learning Assessment (Adult)  Learning Readiness and Ability: cognitive limitation noted  Education Provided  Person Taught: patient  Teaching Method: verbal instruction  Teaching Focus: symptom/problem overview, diagnostic test    Respiratory  Respiratory (Adult)  Airway WDL: WDL  Respiratory WDL  Respiratory WDL: WDL    Abdominal Pain       Pain Assessments       NIH Stroke Scale       Arvind Pan RN  08/15/24 17:49 EDT

## 2024-08-15 NOTE — ED PROVIDER NOTES
EMERGENCY DEPARTMENT ENCOUNTER    Room Number:  17/17  PCP: Yadira Watson MD  Independent Historians: EMS    HPI:  Chief Complaint: had concerns including Abnormal Lab.      A complete HPI/ROS/PMH/PSH/SH/FH are unobtainable due to: Altered Mental Status          Context: Mimi Pinto is a 73 y.o. female with a medical history of UTI, hyperlipidemia, osteoarthritis, depression and anxiety who presents to the ED c/o acute electrolyte disturbances at the rehab facility where she is recovering from a hospital stay.  Patient noted to have hypernatremia so sent to the ER for further evaluation.  EMS noted patient to seem altered and to be hypotensive and tachycardic.  On review of patient's med list from her facility she is not on any antibiotics currently.        Review of prior external notes (non-ED) -and- Review of prior external test results outside of this encounter: Patient admitted to an outlying facility for hospitalization July 26 through July 30.  Patient found down in her apartment by a neighbor.  There was apparently feces and dirt everywhere.  Patient was disheveled.  Patient was noted to have a granddaughter that checks on her but reported to have been crawling around on the ground for at least 2 weeks.  Patient was admitted for UTI, sepsis, bilateral bacterial conjunctivitis, rhabdomyolysis, hypomagnesemia, and hypokalemia.    Prescription drug monitoring program review:     N/A    PAST MEDICAL HISTORY  Active Ambulatory Problems     Diagnosis Date Noted    No Active Ambulatory Problems     Resolved Ambulatory Problems     Diagnosis Date Noted    No Resolved Ambulatory Problems     No Additional Past Medical History         PAST SURGICAL HISTORY  No past surgical history on file.      FAMILY HISTORY  No family history on file.      SOCIAL HISTORY  Social History     Socioeconomic History    Marital status: Single         ALLERGIES  Penicillins and Sulfa antibiotics        REVIEW OF SYSTEMS  Review of  "Systems  Included in HPI  All systems reviewed and negative except for those discussed in HPI.      PHYSICAL EXAM    I have reviewed the triage vital signs and nursing notes.    ED Triage Vitals [08/15/24 1420]   Temp Heart Rate Resp BP SpO2   96.8 °F (36 °C) 103 16 (!) 88/62 95 %      Temp src Heart Rate Source Patient Position BP Location FiO2 (%)   Tympanic Monitor -- -- --       Physical Exam  GENERAL: Pt awake and answers \"yes\" and \"no\" questions, alert, no acute distress  SKIN: Warm, dry, generalized pallor  HENT: Normocephalic, atraumatic  EYES: no scleral icterus, eomi  CV: regular rhythm, accelerated rate  RESPIRATORY: normal effort, diminished at the bases but no wheezing  ABDOMEN: soft, nontender, nondistended  MUSCULOSKELETAL: no deformity  NEURO: alert, moves all extremities, follows most commands                                                                   LAB RESULTS  Recent Results (from the past 24 hour(s))   Comprehensive Metabolic Panel    Collection Time: 08/15/24  2:41 PM    Specimen: Blood   Result Value Ref Range    Glucose 170 (H) 65 - 99 mg/dL    BUN 30 (H) 8 - 23 mg/dL    Creatinine 1.28 (H) 0.57 - 1.00 mg/dL    Sodium 164 (C) 136 - 145 mmol/L    Potassium 3.8 3.5 - 5.2 mmol/L    Chloride 126 (H) 98 - 107 mmol/L    CO2 24.2 22.0 - 29.0 mmol/L    Calcium 10.4 8.6 - 10.5 mg/dL    Total Protein 7.0 6.0 - 8.5 g/dL    Albumin 3.8 3.5 - 5.2 g/dL    ALT (SGPT) 52 (H) 1 - 33 U/L    AST (SGOT) 46 (H) 1 - 32 U/L    Alkaline Phosphatase 115 39 - 117 U/L    Total Bilirubin 0.9 0.0 - 1.2 mg/dL    Globulin 3.2 gm/dL    A/G Ratio 1.2 g/dL    BUN/Creatinine Ratio 23.4 7.0 - 25.0    Anion Gap 13.8 5.0 - 15.0 mmol/L    eGFR 44.6 (L) >60.0 mL/min/1.73   Protime-INR    Collection Time: 08/15/24  2:41 PM    Specimen: Blood   Result Value Ref Range    Protime 15.8 (H) 11.7 - 14.2 Seconds    INR 1.24 (H) 0.90 - 1.10   aPTT    Collection Time: 08/15/24  2:41 PM    Specimen: Blood   Result Value Ref Range    " PTT 23.5 22.7 - 35.4 seconds   Lipase    Collection Time: 08/15/24  2:41 PM    Specimen: Blood   Result Value Ref Range    Lipase 31 13 - 60 U/L   BNP    Collection Time: 08/15/24  2:41 PM    Specimen: Blood   Result Value Ref Range    proBNP 631.0 0.0 - 900.0 pg/mL   High Sensitivity Troponin T    Collection Time: 08/15/24  2:41 PM    Specimen: Blood   Result Value Ref Range    HS Troponin T 30 (H) <14 ng/L   Lactic Acid, Plasma    Collection Time: 08/15/24  2:41 PM    Specimen: Blood   Result Value Ref Range    Lactate 4.1 (C) 0.5 - 2.0 mmol/L   Procalcitonin    Collection Time: 08/15/24  2:41 PM    Specimen: Blood   Result Value Ref Range    Procalcitonin 0.40 (H) 0.00 - 0.25 ng/mL   CK    Collection Time: 08/15/24  2:41 PM    Specimen: Blood   Result Value Ref Range    Creatine Kinase 157 20 - 180 U/L   Magnesium    Collection Time: 08/15/24  2:41 PM    Specimen: Blood   Result Value Ref Range    Magnesium 2.9 (H) 1.6 - 2.4 mg/dL   TSH    Collection Time: 08/15/24  2:41 PM    Specimen: Blood   Result Value Ref Range    TSH 2.070 0.270 - 4.200 uIU/mL   Phosphorus    Collection Time: 08/15/24  2:41 PM    Specimen: Blood   Result Value Ref Range    Phosphorus 3.4 2.5 - 4.5 mg/dL   CBC Auto Differential    Collection Time: 08/15/24  2:41 PM    Specimen: Blood   Result Value Ref Range    WBC 21.24 (H) 3.40 - 10.80 10*3/mm3    RBC 4.95 3.77 - 5.28 10*6/mm3    Hemoglobin 14.7 12.0 - 15.9 g/dL    Hematocrit 44.8 34.0 - 46.6 %    MCV 90.5 79.0 - 97.0 fL    MCH 29.7 26.6 - 33.0 pg    MCHC 32.8 31.5 - 35.7 g/dL    RDW 14.7 12.3 - 15.4 %    RDW-SD 47.1 37.0 - 54.0 fl    MPV 10.5 6.0 - 12.0 fL    Platelets 183 140 - 450 10*3/mm3    Neutrophil % 85.7 (H) 42.7 - 76.0 %    Lymphocyte % 6.1 (L) 19.6 - 45.3 %    Monocyte % 6.6 5.0 - 12.0 %    Eosinophil % 0.0 (L) 0.3 - 6.2 %    Basophil % 0.2 0.0 - 1.5 %    Immature Grans % 1.4 (H) 0.0 - 0.5 %    Neutrophils, Absolute 18.18 (H) 1.70 - 7.00 10*3/mm3    Lymphocytes, Absolute 1.30  0.70 - 3.10 10*3/mm3    Monocytes, Absolute 1.41 (H) 0.10 - 0.90 10*3/mm3    Eosinophils, Absolute 0.01 0.00 - 0.40 10*3/mm3    Basophils, Absolute 0.04 0.00 - 0.20 10*3/mm3    Immature Grans, Absolute 0.30 (H) 0.00 - 0.05 10*3/mm3    nRBC 0.9 (H) 0.0 - 0.2 /100 WBC   Urinalysis With Microscopic If Indicated (No Culture) - Urine, Catheter    Collection Time: 08/15/24  3:07 PM    Specimen: Urine, Catheter   Result Value Ref Range    Color, UA Dark Yellow (A) Yellow, Straw    Appearance, UA Clear Clear    pH, UA 5.5 5.0 - 8.0    Specific Gravity, UA 1.026 1.005 - 1.030    Glucose, UA Negative Negative    Ketones, UA Trace (A) Negative    Bilirubin, UA Negative Negative    Blood, UA Negative Negative    Protein, UA 30 mg/dL (1+) (A) Negative    Leuk Esterase, UA Small (1+) (A) Negative    Nitrite, UA Positive (A) Negative    Urobilinogen, UA 1.0 E.U./dL 0.2 - 1.0 E.U./dL   Urinalysis, Microscopic Only - Urine, Catheter    Collection Time: 08/15/24  3:07 PM    Specimen: Urine, Catheter   Result Value Ref Range    RBC, UA 0-2 None Seen, 0-2 /HPF    WBC, UA 0-2 None Seen, 0-2 /HPF    Bacteria, UA None Seen None Seen /HPF    Squamous Epithelial Cells, UA 3-6 (A) None Seen, 0-2 /HPF    Yeast, UA Present (A) None Seen /HPF    Hyaline Casts, UA 13-20 None Seen /LPF    Methodology Automated Microscopy    ECG 12 Lead Tachycardia    Collection Time: 08/15/24  3:15 PM   Result Value Ref Range    QT Interval 466 ms    QTC Interval 583 ms   POCT Electrolytes +HGB +HCT    Collection Time: 08/15/24  3:19 PM    Specimen: Venous Blood   Result Value Ref Range    Sodium 168 (H) 136 - 145 mmol/L    POC Potassium 3.5 3.5 - 5.2 mmol/L    Ionized Calcium 1.23 (L) 2.20 - 2.55 mmol/L    Glucose 158 (H) 65 - 99 mg/dL    Hematocrit 43 38 - 51 %    Hemoglobin 14.8 12.0 - 17.0 g/dL    Device Comment sats 97%. drawn by [id number] at 1442    Blood Gas, Venous -    Collection Time: 08/15/24  3:19 PM    Specimen: Venous Blood   Result Value Ref Range     pH, Venous 7.426 (H) 7.310 - 7.410 pH Units    pCO2, Venous 40.4 (L) 41.0 - 51.0 mm Hg    pO2, Venous 31.1 (L) 35.0 - 45.0 mm Hg    HCO3, Venous 26.5 22.0 - 28.0 mmol/L    Base Excess, Venous 2.0 -2.0 - 2.0 mmol/L    O2 Saturation, Venous 61.2 45.0 - 75.0 %    Barometric Pressure for Blood Gas 749.1000 mmHg    Modality Room Air     FIO2 21 %    Rate 18 Breaths/minute    Notified Who Dr Caballero     Device Comment sats 97%. drawn by [id number] at 1442          RADIOLOGY  XR Chest 1 View    Result Date: 8/15/2024  XR CHEST 1 VW-  HISTORY: Female who is 73 years-old, sepsis, hypotensive  TECHNIQUE: Frontal view of the chest  COMPARISON: None available  FINDINGS: The heart size is normal. Pulmonary vasculature is unremarkable. Aorta is calcified. Many dense nodules throughout the lungs are noted, compatible with all granulomatous disease. Numerosity of calcified nodules limits assessment for noncalcified nodules; if indicated, CT could be considered for further evaluation. No focal pulmonary consolidation, pleural effusion, or pneumothorax. No acute osseous process.      As described.  This report was finalized on 8/15/2024 3:44 PM by Dr. Wesley Jasmine M.D on Workstation: [id number]         MEDICATIONS GIVEN IN ER  Medications   sodium chloride 0.9 % flush 10 mL (has no administration in time range)   vancomycin IVPB 1500 mg in 0.9% NaCl (Premix) 500 mL (1,500 mg Intravenous New Bag 8/15/24 1654)   sodium chloride 0.45 % infusion (125 mL/hr Intravenous New Bag 8/15/24 1541)   sodium chloride 0.9 % bolus 500 mL (0 mL Intravenous Stopped 8/15/24 1516)   cefepime 2000 mg IVPB in 100 mL NS (MBP) (0 mg Intravenous Stopped 8/15/24 1654)   sodium chloride 0.9 % bolus 500 mL (0 mL Intravenous Stopped 8/15/24 1655)         ORDERS PLACED DURING THIS VISIT:  Orders Placed This Encounter   Procedures    Blood Culture - Blood,    Blood Culture - Blood,    XR Chest 1 View    Comprehensive Metabolic Panel    Protime-INR    aPTT    Lipase     Urinalysis With Microscopic If Indicated (No Culture) - Urine, Catheter    BNP    High Sensitivity Troponin T    Lactic Acid, Plasma    Procalcitonin    CK    Magnesium    TSH    Phosphorus    CBC Auto Differential    Blood Gas, Venous -    Urinalysis, Microscopic Only - Urine, Clean Catch    STAT Lactic Acid, Reflex    Blood Gas, Venous -    High Sensitivity Troponin T 2Hr    Pulmonology (on-call MD unless specified)    POC Electrolyte Panel    POC Lactate    POCT Electrolytes +HGB +HCT    ECG 12 Lead Tachycardia    Insert Peripheral IV    Insert 2nd peripheral IV    Inpatient Admission    CBC & Differential         OUTPATIENT MEDICATION MANAGEMENT:  Current Facility-Administered Medications Ordered in Epic   Medication Dose Route Frequency Provider Last Rate Last Admin    sodium chloride 0.45 % infusion  125 mL/hr Intravenous Continuous Carla Sun  mL/hr at 08/15/24 1541 125 mL/hr at 08/15/24 1541    sodium chloride 0.9 % flush 10 mL  10 mL Intravenous PRN Carla Sun MD        vancomycin IVPB 1500 mg in 0.9% NaCl (Premix) 500 mL  20 mg/kg Intravenous Once Carla Sun .3 mL/hr at 08/15/24 1654 1,500 mg at 08/15/24 1654     No current [facility]-ordered outpatient medications on file.         PROCEDURES  Procedures      Critical care provider statement:    Critical care time (minutes): 46.   Critical care time was exclusive of:  Separately billable procedures and treating other patients   Critical care was necessary to treat or prevent imminent or life-threatening deterioration of the following conditions:  Circulatory Failure and Sepsis   Critical care was time spent personally by me on the following activities:  Development of treatment plan with patient or surrogate, discussions with consultants, evaluation of patient's response to treatment, examination of patient, obtaining history from patient or surrogate, ordering and performing treatments and interventions, ordering  and review of laboratory studies, ordering and review of radiographic studies, pulse oximetry, re-evaluation of patient's condition and review of old charts. Critical Care indicators: Hypernatremia with mental status change and Sepsis / septicemia       PROGRESS, DATA ANALYSIS, CONSULTS, AND MEDICAL DECISION MAKING  All labs have been independently interpreted by me.  All radiology studies have been reviewed by me. All EKG's have been independently viewed and interpreted by me.  Discussion below represents my analysis of pertinent findings related to patient's condition, differential diagnosis, treatment plan and final disposition.    Differential diagnosis includes but is not limited to   Pt presenting from nursing home with abnl labs earlier this am notable for hypernatremia.  Pt recovering there from a hospital stay for sepsis and rhabdomyolysis.          ED Course as of 08/15/24 1704   Thu Aug 15, 2024   1517 EKG ER MD interpretation   Time: 15: 15  Rhythm and rate: Normal sinus rhythm at a rate of 94  Axis: Normal  P waves: Normal  QRS complexes: Normal  ST segments: no elevation nor depressions baseline artifact [AR]   1520 I viewed patient's chest x-ray and on my interpretation patient has normal heart size and no large pulmonary consolidation although has scattered foci of infiltrates versus scarring [AR]   1704 I discussed patient's case with Dr. Chen with pulm critical care who is amenable to continuing care for patient in hospital [AR]      ED Course User Index  [AR] Carla Sun MD             AS OF 17:04 EDT VITALS:    BP - 110/79  HR - 93  TEMP - 96.8 °F (36 °C) (Tympanic)  O2 SATS - 97%      COMPLEXITY OF CARE  The patient requires admission.    DIAGNOSIS  Final diagnoses:   Septic shock   Hypernatremia         DISPOSITION  ED Disposition       ED Disposition   Decision to Admit    Condition   --    Comment   Level of Care: Critical Care [6]   Diagnosis: Hypernatremia [841325]   Admitting  Physician: DOROTHEA MARAVILLA [[id number]]   Attending Physician: DOROTHEA MARAVILLA [[id number]]   Certification: I Certify That Inpatient Hospital Services Are Medically Necessary For Greater Than 2 Midnights                  Please note that portions of this document were completed with a voice recognition program.    Note Disclaimer: At [facility], we believe that sharing information builds trust and better relationships. You are receiving this note because you recently visited [facility]. It is possible you will see health information before a provider has talked with you about it. This kind of information can be easy to misunderstand. To help you fully understand what it means for your health, we urge you to discuss this note with your provider.         Carla Sun MD  08/15/24 4535

## 2024-08-15 NOTE — H&P
.     Admission Note    Patient Identification:  Mimi Pinto  73 y.o.  female  1950  [id number]            CC: ams    History of Present Illness:  Patient is a 73-year-old who presented from facility with altered mental status.  She is obtunded and really not able to give me any reliable history.  She does awaken with stimulation mumbles however it is really not coherent and cannot provide any reliable history.    Discussed with ER provider who relates that the patient had been rather independent up until last hospital stay.  Reportedly she had a prolonged hospital stay with sepsis and urinary tract infection.  She was discharged to rehab facility where she was brought in from today secondary to altered mental status.    Her sodium on her renal function panel was 164.  Her white count is elevated.  We are asked to admit for severe hyponatremia altered mental status and concern for sepsis with UTI being potential source.      Patient is unable to verify any previous medical history surgical history social history family history medications or allergies to me.       Allergies   Allergen Reactions    Penicillins Provider Review Needed    Sulfa Antibiotics Provider Review Needed       Review of Systems:  Unable to obtain accurate ROS as patient is unable to answer questions due to altered mental status somnolence      Physical Exam:  Vitals:  Vitals:    08/15/24 1501 08/15/24 1509 08/15/24 1516 08/15/24 1621   BP: 116/72  110/79    Pulse: 96 90 93    Resp:       Temp:       TempSrc:       SpO2:  96% 97%    Weight:    70.1 kg (154 lb 9.6 oz)           There is no height or weight on file to calculate BMI.    Intake/Output Summary (Last 24 hours) at 8/15/2024 1700  Last data filed at 8/15/2024 1655  Gross per 24 hour   Intake 1100 ml   Output --   Net 1100 ml       Exam:  General appearance: Ill-appearing moans toxic mumbles not cohrent  HENT: Atraumatic; oropharynx dry mucous membranes and no mucosal ulcerations;  normal hard and soft palate  Neck: Trachea midline;supple  Lungs: Bilateral air entry, with normal respiratory effort and no intercostal retractions  CV: Tachycardic regular no rub  Abdomen: Abdomen is obese that is soft is nonrigid nontender  Extremities: No significant peripheral edema  Skin: Warm dry ecchymosis right calf no diffuse visible rash  Psych/neuro she is altered she mumbles she is lethargic however will awaken she is unable to answer questions appropriately.      Scheduled meds:  vancomycin, 20 mg/kg, Intravenous, Once        Data Review:   I reviewed the patient's medications and new clinical results.  Lab Results   Component Value Date    CALCIUM 10.4 08/15/2024    PHOS 3.4 08/15/2024     Results from last 7 days   Lab Units 08/15/24  1519 08/15/24  1441   AST (SGOT) U/L  --  46*   ALT (SGPT) U/L  --  52*   MAGNESIUM mg/dL  --  2.9*   SODIUM mmol/L  --  164*   POTASSIUM mmol/L  --  3.8   CHLORIDE mmol/L  --  126*   CO2 mmol/L  --  24.2   BUN mg/dL  --  30*   CREATININE mg/dL  --  1.28*   GLUCOSE mg/dL  --  170*   CALCIUM mg/dL  --  10.4   WBC 10*3/mm3  --  21.24*   HEMOGLOBIN g/dL  --  14.7   HEMOGLOBIN, POC g/dL 14.8  --    PLATELETS 10*3/mm3  --  183     Results from last 7 days   Lab Units 08/15/24  1441   CK TOTAL U/L 157   HSTROP T ng/L 30*         CrCl cannot be calculated (Unknown ideal weight.).    IMAGING:  I have reviewed all imaging studies since my last documentation.  Imaging Results (Most Recent)       Procedure Component Value Units Date/Time    XR Chest 1 View [[id number]] Collected: 08/15/24 1541     Updated: 08/15/24 1547    Narrative:      XR CHEST 1 VW-     HISTORY: Female who is 73 years-old, sepsis, hypotensive     TECHNIQUE: Frontal view of the chest     COMPARISON: None available     FINDINGS: The heart size is normal. Pulmonary vasculature is  unremarkable. Aorta is calcified. Many dense nodules throughout the  lungs are noted, compatible with all granulomatous disease.  Numerosity  of calcified nodules limits assessment for noncalcified nodules; if  indicated, CT could be considered for further evaluation. No focal  pulmonary consolidation, pleural effusion, or pneumothorax. No acute  osseous process.       Impression:      As described.     This report was finalized on 8/15/2024 3:44 PM by Dr. Wesley Jasmine M.D on Workstation: [id number]             CT head no acute intracranial process      ASSESSMENT /   PLAN:  Severe Hypernatremia  Severe Hyperchloremia  Lactic acidosis  Sepsis  HLD  OA  Depression  Anxiety  UTI  Altered Mental Status  Recent prolonged hospitalization after being found down with rhabdomyolysis and uti      Gentle d5 half normal saline  LR bolus -another liter to complete sepsis bolus  Will ask nephrology to help guide severe hypernatremia  Hydration  Serial electrolytes    Stat ct abd pelvis    Empiric cefepime    Discussed with ER provider  No family present  Discussed with ER nurse  Admit to the ICU      Total critical care time was 50minutes, excluding any separately billable procedure time.  Electronically signed by Larry Chen MD, 08/15/24, 5:38 PM EDT.

## 2024-08-16 NOTE — CASE MANAGEMENT/SOCIAL WORK
Discharge Planning Assessment  [facility]     Patient Name: Mimi Pinto  MRN: [id number]  Today's Date: 8/16/2024    Admit Date: 8/15/2024        Discharge Needs Assessment    No documentation.                  Discharge Plan       Row Name 08/16/24 6694       Plan    Plan Comments Can return to [facility] skilled; needs pre cert. Kevin Juarez was skilled with eventual plans for LTC. Confirm plans with family. Rehan Britton RN                  Continued Care and Services - Admitted Since 8/15/2024       Destination       Service Provider Request Status Selected Services Address Phone Fax Patient Preferred    [facility] Accepted N/A [address] [address] [phone] --                     Demographic Summary    No documentation.                  Functional Status    No documentation.                  Psychosocial    No documentation.                  Abuse/Neglect    No documentation.                  Legal    No documentation.                  Substance Abuse    No documentation.                  Patient Forms    No documentation.                     Rehan Britton RN

## 2024-08-16 NOTE — PROGRESS NOTES
"    [facility] INPATIENT PROGRESS NOTE         [facility] INTENSIVE CARE    2024      PATIENT IDENTIFICATION:  Name: Mimi Pinto ADMIT: 8/15/2024   : 1950  PCP: Yadira Watson MD    MRN: [id number] LOS: 1 days   AGE/SEX: 73 y.o. female  ROOM: [facility]                     LOS 1    Reason for visit: Hypernatremia and altered mental status      SUBJECTIVE:      Awake and alert.  Chart reviewed.  Denies nausea, cough or chest pain. I am seeing the patient for the first time today.  All patient problems are new to me.      Objective   OBJECTIVE:    Vital Sign Min/Max for last 24 hours  Temp  Min: 96.8 °F (36 °C)  Max: 98.7 °F (37.1 °C)   BP  Min: 62/47  Max: 126/101   Pulse  Min: 69  Max: 104   Resp  Min: 16  Max: 16   SpO2  Min: 92 %  Max: 98 %   No data recorded   Weight  Min: 70.1 kg (154 lb 9.6 oz)  Max: 73.5 kg (162 lb 0.6 oz)    Vitals:    24 0806 24 0822 24 0900 24 1000   BP: 109/90  111/76 104/69   Pulse: 91 95 100 98   Resp:       Temp:  97.9 °F (36.6 °C)     TempSrc:  Oral     SpO2:  96% 93% 93%   Weight:       Height:    154.9 cm (61\")            08/15/24  1621 24  0338   Weight: 70.1 kg (154 lb 9.6 oz) 73.5 kg (162 lb 0.6 oz)       Body mass index is 30.62 kg/m².                          Body mass index is 30.62 kg/m².    Intake/Output Summary (Last 24 hours) at 2024 1050  Last data filed at 2024 0545  Gross per 24 hour   Intake 4495 ml   Output 425 ml   Net 4070 ml         Exam:  GEN:  No distress, appears stated age  EYES:   PERRL, anicteric sclerae  ENT:    External ears/nose normal, OP clear  NECK:  No adenopathy, midline trachea  LUNGS: Normal chest on inspection, palpation and auscultation  CV:  Normal S1S2, without murmur  ABD:  Nontender, nondistended, no hepatosplenomegaly, +BS  EXT:  No edema.  No cyanosis or clubbing.  No mottling and normal cap refill.    Assessment     Scheduled meds:  cefepime, 2,000 mg, Intravenous, " Q8H  senna-docusate sodium, 2 tablet, Oral, BID  sodium chloride, 10 mL, Intravenous, Q12H      IV meds:                      norepinephrine, 0.02-0.3 mcg/kg/min, Last Rate: Stopped (08/16/24 0806)  sodium chloride, 125 mL/hr, Last Rate: 125 mL/hr (08/16/24 1017)      Data Review:  Results from last 7 days   Lab Units 08/16/24  0329 08/15/24  2121 08/15/24  1441   SODIUM mmol/L 154* 162* 164*   POTASSIUM mmol/L 4.1 3.5 3.8   CHLORIDE mmol/L 126* 129* 126*   CO2 mmol/L 17.0* 21.0* 24.2   BUN mg/dL 27* 30* 30*   CREATININE mg/dL 0.80 1.01* 1.28*   GLUCOSE mg/dL 127* 94 170*   CALCIUM mg/dL 8.2* 8.9 10.4         Estimated Creatinine Clearance: 58.3 mL/min (by C-G formula based on SCr of 0.8 mg/dL).  Results from last 7 days   Lab Units 08/16/24  0452 08/15/24  1519 08/15/24  1441   WBC 10*3/mm3 16.54*  --  21.24*   HEMOGLOBIN g/dL 11.2*  --  14.7   HEMOGLOBIN, POC g/dL  --  14.8  --    PLATELETS 10*3/mm3 143  --  183     Results from last 7 days   Lab Units 08/15/24  1441   INR  1.24*     Results from last 7 days   Lab Units 08/16/24  0329 08/15/24  1441   ALT (SGPT) U/L 38* 52*   AST (SGOT) U/L 32 46*         Results from last 7 days   Lab Units 08/16/24  0452 08/16/24  0141 08/15/24  2121 08/15/24  1441   PROCALCITONIN ng/mL  --   --   --  0.40*   LACTATE mmol/L 2.8* 2.2* 3.2* 4.1*         Glucose   Date/Time Value Ref Range Status   08/16/2024 0634 114 70 - 130 mg/dL Final   08/16/2024 0107 123 70 - 130 mg/dL Final   08/15/2024 1823 150 (H) 70 - 130 mg/dL Final   08/15/2024 1519 158 (H) 65 - 99 mg/dL Final         Imaging reviewed  CT abdomen pelvis reviewed: No acute    Chest x-ray 8/15 reviewed      Microbiology reviewed            Active Hospital Problems    Diagnosis  POA    **Hypernatremia [E87.0]  Yes      Resolved Hospital Problems   No resolved problems to display.         ASSESSMENT:  Severe Hypernatremia  Severe Hyperchloremia  Lactic acidosis  Sepsis  HLD  OA  Depression  Anxiety  UTI  Altered Mental  Status  Recent prolonged hospitalization after being found down with rhabdomyolysis and uti  Granulomatous disease significant on chest imaging      PLAN:  Correcting hypernatremia.  Nephrology input appreciated.  Continue antibiotic for sepsis and UTI.  Serial labs.  Control glucose.  Control blood pressure.  Add DVT prophylaxis.    Discussed with multidisciplinary ICU team on rounds this morning.       Nicholas Brown MD  Pulmonary and Critical Care Medicine  [facility], [facility]  8/16/2024    10:50 EDT

## 2024-08-16 NOTE — DISCHARGE PLACEMENT REQUEST
"iMmi Cadena (73 y.o. Female)       Date of Birth   1950    Social Security Number       Address   [address] [place], [place] [address]    Home Phone   [phone]    MRN   [id number]       Buddhism   None    Marital Status   Single                            Admission Date   8/15/24    Admission Type   Emergency    Admitting Provider   Larry Chen MD    Attending Provider   Larry Chen MD    Department, Room/Bed   [facility] INTENSIVE CARE, I389/1       Discharge Date       Discharge Disposition       Discharge Destination                                 Attending Provider: Larry Chen MD    Allergies: Penicillins, Sulfa Antibiotics    Isolation: None   Infection: None   Code Status: Not on file    Ht: 154.9 cm (61\")   Wt: 73.5 kg (162 lb 0.6 oz)    Admission Cmt: None   Principal Problem: Hypernatremia [E87.0]                   Active Insurance as of 8/15/2024       Primary Coverage       Payor Plan Insurance Group Employer/Plan Group    ANTHEM MEDICARE REPLACEMENT ANTHEM MEDICARE ADVANTAGE [id number]       Payor Plan Address Payor Plan Phone Number Payor Plan Fax Number Effective Dates    [address] [phone]  1/1/2024 - None Entered    [facility] [id number]         Subscriber Name Subscriber Birth Date Member ID       MIMI CADENA 1950 [id number]                     Emergency Contacts        (Rel.) Home Phone Work Phone Mobile Phone    RODDY CHILEL (Relative) -- -- [phone]    CHILANGO JIMENEZ (Relative) -- -- [phone]              {Outbreak/Travel/Exposure Documentation......;  Question Available Choices Patient Response   COVID-19 Outbreak Screen:  Do you currently have a new onset of the following symptoms?        Fever/Chills, Cough, Shortness of air, Loss of taste or smell, No, Unknown  Unknown (08/15/24 1431)   COVID-19 Outbreak Screen: In the last 14 days, have you had contact with anyone who " is ill, has show any of the symptoms listed above and/or has been diagnosis with the 2019 Novel Coronavirus? This includes any immediate household members but excludes any patients with whom you have been in contact within your normal work duties wearing proper PPE, if you are a healthcare worker.  Yes, No, Unknown              (not recorded)   COVID-19 Outbreak Screen: Who was notified? Free text (not recorded)   Ebola Screening Outbreak Screen: Have you traveled to the [place] the [place] or [place] within the past 21 days?  Yes, No, Unknown (not recorded)   Ebola Screening Outbreak Screen: Do you have ANY of the following symptoms: Fever/Chills, Vomiting, Diarrhea, Fatigue, Headache, Muscle pain, Unexplained bleeding, Abdominal (stomach) pain, No, Unknown (not recorded)   Ebola Screening Outbreak Screen: Name of Person notified Free text (not recorded)   Travel Screen: Have you traveled in the last month? If so, to what country have you traveled? If [place] what state? Yes, No, Unknown  List of all countries  List of all States Unknown (08/15/24 1431)  (not recorded)  (not recorded)   Infection Risk: Do you currently have the following symptoms?  (If cough is selected, the Tuberculosis Screen is performed.) Cough, Fever, Rash, No No (08/15/24 1431)   Tuberculosis Screen: Do you have any of the following Tuberculosis Risks?  Have you lived or spent time with anyone who had or may have TB?  Have you lived in or visited any of the following areas for more than one month: [place], [place], [place], [place] or [place], the [place] or [place]?  Do you have HIV/AIDS?  Have you lived in or worked in a nursing home, homeless shelter, correctional facility, or substance abuse treatment facility?   No    If Yes do you have any of the following symptoms? Yes responses display to the right    If Yes, symptoms listed are:  Cough greater than or equal to 3 weeks, Loss of appetite, Unexplained weight loss, Night  sweats, Bloody sputum or hemoptysis, Hoarseness, Fever, Fatigue, Chest pain, No (not recorded)  (not recorded)   Exposure Screen: Have you been exposed to any of these contagious diseases in the last month? Measles, Chickenpox, Meningitis, Pertussis, Whooping Cough, No No (08/15/24 9116)

## 2024-08-16 NOTE — PLAN OF CARE
Goal Outcome Evaluation:              Outcome Evaluation: Swallow eval completed. Pt demonstrated decresed oral motor control and coordination. Pt tolerated ice chips. Weak coughing occurred with thin via spoon. Inconsistent coughing noted w/ NTL cup/straw. No s/s with pureed and HTL. Swallow was delayed, adequate elevation once initiated. Recommend pureed with HTL; meds crushed in pureed; upright for meals and 30 min after; slow rate; small bites/sips. If s/s are noted, make NPO. ST to follow.      Anticipated Discharge Disposition (SLP): unknown          SLP Swallowing Diagnosis: oral dysphagia, suspected pharyngeal dysphagia (08/16/24 1200)

## 2024-08-16 NOTE — CONSULTS
Referring Provider: Dr. Chen  Reason for Consultation: ESTEFANI    Subjective     Chief complaint   Chief Complaint   Patient presents with    Abnormal Lab       History of present illness:  73 year old  admitted for altered mental status from her nursing facility and found to have a sodium of 164.  She had been admitted recently due to urosepsis and discharged to a facility.  She is easily awakened and following commands but confused.    No past medical history on file.  No past surgical history on file.  No family history on file.       Medications Prior to Admission   Medication Sig Dispense Refill Last Dose    docusate sodium (COLACE) 100 MG capsule Take 1 capsule by mouth 2 (Two) Times a Day.   8/15/2024    nystatin (MYCOSTATIN) 100,000 unit/mL suspension Swish and spit 5 mL 4 (Four) Times a Day.   8/15/2024    selenium sulfide (SELSUN) 1 % lotion Apply 1 Application topically to the appropriate area as directed 2 (Two) Times a Day. Cleanse under bilateral breasts with selsun blue until healed   8/15/2024     Allergies:  Penicillins and Sulfa antibiotics    Review of Systems  14 points review of system was performed and it was negative other than what noted above in the HPI    Objective     Vital Signs  Temp:  [96.8 °F (36 °C)-98.7 °F (37.1 °C)] 97.9 °F (36.6 °C)  Heart Rate:  [] 91  Resp:  [16] 16  BP: ()/() 109/90    Flowsheet Rows      Flowsheet Row First Filed Value   Admission Height --   Admission Weight 70.1 kg (154 lb 9.6 oz) Documented at 08/15/2024 1621             No intake/output data recorded.  I/O last 3 completed shifts:  In: 4495 [I.V.:3295; IV Piggyback:1200]  Out: 425 [Urine:425]    Intake/Output Summary (Last 24 hours) at 8/16/2024 1027  Last data filed at 8/16/2024 0545  Gross per 24 hour   Intake 4495 ml   Output 425 ml   Net 4070 ml       Physical Exam:  General Appearance: Frail, no acute distress,   Skin: warm and dry  HEENT: pupils round and reactive to light, oral  mucosa normal, nonicteric sclera  Neck: supple, no JVD, trachea midline  Lungs: Diminished, unlabored breathing effort  Heart: RRR, normal S1 and S2, no S3, no rub  Abdomen: soft, nontender, normoactive bowels  : no palpable bladder,  Extremities: no edema, cyanosis or clubbing  Neuro: normal speech and mental status    Results Review:  Results for orders placed or performed during the hospital encounter of 08/15/24   Comprehensive Metabolic Panel    Specimen: Blood   Result Value Ref Range    Glucose 170 (H) 65 - 99 mg/dL    BUN 30 (H) 8 - 23 mg/dL    Creatinine 1.28 (H) 0.57 - 1.00 mg/dL    Sodium 164 (C) 136 - 145 mmol/L    Potassium 3.8 3.5 - 5.2 mmol/L    Chloride 126 (H) 98 - 107 mmol/L    CO2 24.2 22.0 - 29.0 mmol/L    Calcium 10.4 8.6 - 10.5 mg/dL    Total Protein 7.0 6.0 - 8.5 g/dL    Albumin 3.8 3.5 - 5.2 g/dL    ALT (SGPT) 52 (H) 1 - 33 U/L    AST (SGOT) 46 (H) 1 - 32 U/L    Alkaline Phosphatase 115 39 - 117 U/L    Total Bilirubin 0.9 0.0 - 1.2 mg/dL    Globulin 3.2 gm/dL    A/G Ratio 1.2 g/dL    BUN/Creatinine Ratio 23.4 7.0 - 25.0    Anion Gap 13.8 5.0 - 15.0 mmol/L    eGFR 44.6 (L) >60.0 mL/min/1.73   Protime-INR    Specimen: Blood   Result Value Ref Range    Protime 15.8 (H) 11.7 - 14.2 Seconds    INR 1.24 (H) 0.90 - 1.10   aPTT    Specimen: Blood   Result Value Ref Range    PTT 23.5 22.7 - 35.4 seconds   Lipase    Specimen: Blood   Result Value Ref Range    Lipase 31 13 - 60 U/L   Urinalysis With Microscopic If Indicated (No Culture) - Urine, Catheter    Specimen: Urine, Catheter   Result Value Ref Range    Color, UA Dark Yellow (A) Yellow, Straw    Appearance, UA Clear Clear    pH, UA 5.5 5.0 - 8.0    Specific Gravity, UA 1.026 1.005 - 1.030    Glucose, UA Negative Negative    Ketones, UA Trace (A) Negative    Bilirubin, UA Negative Negative    Blood, UA Negative Negative    Protein, UA 30 mg/dL (1+) (A) Negative    Leuk Esterase, UA Small (1+) (A) Negative    Nitrite, UA Positive (A) Negative     Urobilinogen, UA 1.0 E.U./dL 0.2 - 1.0 E.U./dL   BNP    Specimen: Blood   Result Value Ref Range    proBNP 631.0 0.0 - 900.0 pg/mL   High Sensitivity Troponin T    Specimen: Blood   Result Value Ref Range    HS Troponin T 30 (H) <14 ng/L   Lactic Acid, Plasma    Specimen: Blood   Result Value Ref Range    Lactate 4.1 (C) 0.5 - 2.0 mmol/L   Procalcitonin    Specimen: Blood   Result Value Ref Range    Procalcitonin 0.40 (H) 0.00 - 0.25 ng/mL   CK    Specimen: Blood   Result Value Ref Range    Creatine Kinase 157 20 - 180 U/L   Magnesium    Specimen: Blood   Result Value Ref Range    Magnesium 2.9 (H) 1.6 - 2.4 mg/dL   TSH    Specimen: Blood   Result Value Ref Range    TSH 2.070 0.270 - 4.200 uIU/mL   Phosphorus    Specimen: Blood   Result Value Ref Range    Phosphorus 3.4 2.5 - 4.5 mg/dL   CBC Auto Differential    Specimen: Blood   Result Value Ref Range    WBC 21.24 (H) 3.40 - 10.80 10*3/mm3    RBC 4.95 3.77 - 5.28 10*6/mm3    Hemoglobin 14.7 12.0 - 15.9 g/dL    Hematocrit 44.8 34.0 - 46.6 %    MCV 90.5 79.0 - 97.0 fL    MCH 29.7 26.6 - 33.0 pg    MCHC 32.8 31.5 - 35.7 g/dL    RDW 14.7 12.3 - 15.4 %    RDW-SD 47.1 37.0 - 54.0 fl    MPV 10.5 6.0 - 12.0 fL    Platelets 183 140 - 450 10*3/mm3    Neutrophil % 85.7 (H) 42.7 - 76.0 %    Lymphocyte % 6.1 (L) 19.6 - 45.3 %    Monocyte % 6.6 5.0 - 12.0 %    Eosinophil % 0.0 (L) 0.3 - 6.2 %    Basophil % 0.2 0.0 - 1.5 %    Immature Grans % 1.4 (H) 0.0 - 0.5 %    Neutrophils, Absolute 18.18 (H) 1.70 - 7.00 10*3/mm3    Lymphocytes, Absolute 1.30 0.70 - 3.10 10*3/mm3    Monocytes, Absolute 1.41 (H) 0.10 - 0.90 10*3/mm3    Eosinophils, Absolute 0.01 0.00 - 0.40 10*3/mm3    Basophils, Absolute 0.04 0.00 - 0.20 10*3/mm3    Immature Grans, Absolute 0.30 (H) 0.00 - 0.05 10*3/mm3    nRBC 0.9 (H) 0.0 - 0.2 /100 WBC   Urinalysis, Microscopic Only - Urine, Catheter    Specimen: Urine, Catheter   Result Value Ref Range    RBC, UA 0-2 None Seen, 0-2 /HPF    WBC, UA 0-2 None Seen, 0-2 /HPF     Bacteria, UA None Seen None Seen /HPF    Squamous Epithelial Cells, UA 3-6 (A) None Seen, 0-2 /HPF    Yeast, UA Present (A) None Seen /HPF    Hyaline Casts, UA 13-20 None Seen /LPF    Methodology Automated Microscopy    STAT Lactic Acid, Reflex    Specimen: Blood   Result Value Ref Range    Lactate 3.2 (C) 0.5 - 2.0 mmol/L   Blood Gas, Venous -    Specimen: Venous Blood   Result Value Ref Range    pH, Venous 7.426 (H) 7.310 - 7.410 pH Units    pCO2, Venous 40.4 (L) 41.0 - 51.0 mm Hg    pO2, Venous 31.1 (L) 35.0 - 45.0 mm Hg    HCO3, Venous 26.5 22.0 - 28.0 mmol/L    Base Excess, Venous 2.0 -2.0 - 2.0 mmol/L    O2 Saturation, Venous 61.2 45.0 - 75.0 %    Barometric Pressure for Blood Gas 749.1000 mmHg    Modality Room Air     FIO2 21 %    Rate 18 Breaths/minute    Notified Kristyn Caballero     Device Comment sats 97%. drawn by [id number] at 1442    High Sensitivity Troponin T 2Hr    Specimen: Blood   Result Value Ref Range    HS Troponin T 30 (H) <14 ng/L    Troponin T Delta 0 >=-4 - <+4 ng/L   Basic Metabolic Panel    Specimen: Blood   Result Value Ref Range    Glucose 94 65 - 99 mg/dL    BUN 30 (H) 8 - 23 mg/dL    Creatinine 1.01 (H) 0.57 - 1.00 mg/dL    Sodium 162 (C) 136 - 145 mmol/L    Potassium 3.5 3.5 - 5.2 mmol/L    Chloride 129 (H) 98 - 107 mmol/L    CO2 21.0 (L) 22.0 - 29.0 mmol/L    Calcium 8.9 8.6 - 10.5 mg/dL    BUN/Creatinine Ratio 29.7 (H) 7.0 - 25.0    Anion Gap 12.0 5.0 - 15.0 mmol/L    eGFR 59.3 (L) >60.0 mL/min/1.73   STAT Lactic Acid, Reflex    Specimen: Blood   Result Value Ref Range    Lactate 2.2 (C) 0.5 - 2.0 mmol/L   Comprehensive Metabolic Panel    Specimen: Blood   Result Value Ref Range    Glucose 127 (H) 65 - 99 mg/dL    BUN 27 (H) 8 - 23 mg/dL    Creatinine 0.80 0.57 - 1.00 mg/dL    Sodium 154 (H) 136 - 145 mmol/L    Potassium 4.1 3.5 - 5.2 mmol/L    Chloride 126 (H) 98 - 107 mmol/L    CO2 17.0 (L) 22.0 - 29.0 mmol/L    Calcium 8.2 (L) 8.6 - 10.5 mg/dL    Total Protein 4.9 (L) 6.0 - 8.5 g/dL     Albumin 2.9 (L) 3.5 - 5.2 g/dL    ALT (SGPT) 38 (H) 1 - 33 U/L    AST (SGOT) 32 1 - 32 U/L    Alkaline Phosphatase 89 39 - 117 U/L    Total Bilirubin 0.6 0.0 - 1.2 mg/dL    Globulin 2.0 gm/dL    A/G Ratio 1.5 g/dL    BUN/Creatinine Ratio 33.8 (H) 7.0 - 25.0    Anion Gap 11.0 5.0 - 15.0 mmol/L    eGFR 78.4 >60.0 mL/min/1.73   CBC Auto Differential    Specimen: Blood   Result Value Ref Range    WBC 16.54 (H) 3.40 - 10.80 10*3/mm3    RBC 3.68 (L) 3.77 - 5.28 10*6/mm3    Hemoglobin 11.2 (L) 12.0 - 15.9 g/dL    Hematocrit 33.8 (L) 34.0 - 46.6 %    MCV 91.8 79.0 - 97.0 fL    MCH 30.4 26.6 - 33.0 pg    MCHC 33.1 31.5 - 35.7 g/dL    RDW 14.8 12.3 - 15.4 %    RDW-SD 48.4 37.0 - 54.0 fl    MPV 10.6 6.0 - 12.0 fL    Platelets 143 140 - 450 10*3/mm3    Neutrophil % 81.2 (H) 42.7 - 76.0 %    Lymphocyte % 9.9 (L) 19.6 - 45.3 %    Monocyte % 7.2 5.0 - 12.0 %    Eosinophil % 0.2 (L) 0.3 - 6.2 %    Basophil % 0.2 0.0 - 1.5 %    Immature Grans % 1.3 (H) 0.0 - 0.5 %    Neutrophils, Absolute 13.42 (H) 1.70 - 7.00 10*3/mm3    Lymphocytes, Absolute 1.64 0.70 - 3.10 10*3/mm3    Monocytes, Absolute 1.19 (H) 0.10 - 0.90 10*3/mm3    Eosinophils, Absolute 0.04 0.00 - 0.40 10*3/mm3    Basophils, Absolute 0.04 0.00 - 0.20 10*3/mm3    Immature Grans, Absolute 0.21 (H) 0.00 - 0.05 10*3/mm3    nRBC 0.5 (H) 0.0 - 0.2 /100 WBC   STAT Lactic Acid, Reflex    Specimen: Blood   Result Value Ref Range    Lactate 2.8 (C) 0.5 - 2.0 mmol/L   POCT Electrolytes +HGB +HCT    Specimen: Venous Blood   Result Value Ref Range    Sodium 168 (H) 136 - 145 mmol/L    POC Potassium 3.5 3.5 - 5.2 mmol/L    Ionized Calcium 1.23 (L) 2.20 - 2.55 mmol/L    Glucose 158 (H) 65 - 99 mg/dL    Hematocrit 43 38 - 51 %    Hemoglobin 14.8 12.0 - 17.0 g/dL    Device Comment sats 97%. drawn by [id number] at 1442    POC Glucose Once    Specimen: Blood   Result Value Ref Range    Glucose 150 (H) 70 - 130 mg/dL   POC Glucose Once    Specimen: Blood   Result Value Ref Range    Glucose 123  70 - 130 mg/dL   POC Glucose Once    Specimen: Blood   Result Value Ref Range    Glucose 114 70 - 130 mg/dL   ECG 12 Lead Tachycardia   Result Value Ref Range    QT Interval 466 ms    QTC Interval 583 ms     Imaging Results (Last 72 Hours)       Procedure Component Value Units Date/Time    CT Abdomen Pelvis Without Contrast [[id number]] Collected: 08/15/24 1839     Updated: 08/15/24 1850    Narrative:      CT ABDOMEN PELVIS WO CONTRAST-     DATE OF EXAM: 8/15/2024 5:50 PM     INDICATION: Sepsis.     COMPARISON: Chest radiograph 8/15/2024.     TECHNIQUE: Multiple contiguous axial images were acquired through the  abdomen and pelvis without the intravenous administration of contrast.  Reformatted coronal and sagittal sequences were also reviewed. Radiation  dose reduction techniques were utilized, including automated exposure  control and exposure modulation based on body size.     FINDINGS:  Numerous calcified granulomas in each lung base. Mild bilateral  dependent atelectasis with dependent groundglass opacities in the base  of the right lower lobe that could represent superimposed atypical  pneumonia.     The liver, gallbladder, spleen, pancreas, adrenal glands, and kidneys  are unremarkable in limited noncontrast CT appearance. No renal or  ureteral stone is identified but no hydronephrosis or hydroureter. The  urinary bladder, uterus, and adnexa are unremarkable in limited  noncontrast CT appearance. Phleboliths in the pelvis.     Tiny hiatal hernia. Duodenal diverticulum. Mild colonic stool with  moderate formed stool distention of the rectum. No bowel obstruction or  significant bowel wall thickening. The appendix is normal.     No free fluid in the abdomen or pelvis. No free intraperitoneal air. No  pathologically enlarged lymph nodes in the abdomen or pelvis. Normal  variant circumaortic left renal vein. Mild calcified atherosclerotic  disease in the distal abdominal aorta and its distal branches  without  aneurysm.     Osseous fusion across the L5 and S1 vertebral bodies. Moderate to severe  multilevel lumbar spondylosis. Mild to moderate bilateral hip and SI  joint DJD and mild to moderate DJD of the pubic symphysis. No acute  osseous abnormality or concerning osseous lesion.       Impression:         1. No acute noncontrast CT abnormality in the abdomen or pelvis.  2. Bibasilar dependent ectasis with dependent groundglass opacities in  the base of the right lower lobe that could reflect superimposed  atypical pneumonia.     This report was finalized on 8/15/2024 6:47 PM by Sean Hollis MD on  Workstation: [id number]       XR Chest 1 View [[id number]] Collected: 08/15/24 1541     Updated: 08/15/24 1547    Narrative:      XR CHEST 1 VW-     HISTORY: Female who is 73 years-old, sepsis, hypotensive     TECHNIQUE: Frontal view of the chest     COMPARISON: None available     FINDINGS: The heart size is normal. Pulmonary vasculature is  unremarkable. Aorta is calcified. Many dense nodules throughout the  lungs are noted, compatible with all granulomatous disease. Numerosity  of calcified nodules limits assessment for noncalcified nodules; if  indicated, CT could be considered for further evaluation. No focal  pulmonary consolidation, pleural effusion, or pneumothorax. No acute  osseous process.       Impression:      As described.     This report was finalized on 8/15/2024 3:44 PM by Dr. Wesley Jasmine M.D on Workstation: [id number]                 cefepime, 2,000 mg, Intravenous, Q8H  senna-docusate sodium, 2 tablet, Oral, BID  sodium chloride, 10 mL, Intravenous, Q12H      norepinephrine, 0.02-0.3 mcg/kg/min, Last Rate: Stopped (08/16/24 0806)  sodium chloride, 125 mL/hr, Last Rate: 125 mL/hr (08/16/24 1017)        Assessment & Plan       Hypernatremia    Hypernatremia:  due to volume contraction.  Improving. Continue serial bmps.  Sepsis with lactic acidosis on cefepime  Mental status changes:  improving  but still confused  Hypotension:  on levophed that is being weaned, and IVF    I discussed the patient's findings and my recommendations with nursing staff    Adelaida Hoyt MD  08/16/24  10:27 EDT    Please note that portions of this note were completed with a voice recognition program.

## 2024-08-16 NOTE — PROGRESS NOTES
"Nutrition Services    Patient Name:  Mimi Pinto  YOB: 1950  MRN: [id number]  Admit Date:  8/15/2024Assessment Date:  08/16/24    Summary: BIJAN Espinoza yoF admitted with AMS from nursing home. Found to have hypernatremia. Awake, but confused. Concern for sepsis with UTI. PMH: HLD, rhabdomyolysis. Seen by SLP today and ok to start pureed/honey thick. Unsure of weight hx.   Labs: Na 154, K 4.1, BUN 27, Cr 0.8, gluc 114-127, Mg 2.9, PO4 3.4  Meds: levo, IVF, IV abx, pericolace     PLAN  - magic cup TID   - encourage oral intakes  - GOC discussion pending     RD to follow     CLINICAL NUTRITION ASSESSMENT      Reason for Assessment Pressure Injury and/or Non-Healing Wound     Diagnosis/Problem   Hypernatremia    Medical/Surgical History No past medical history on file.    No past surgical history on file.     Anthropometrics        Current Height  Current Weight  BMI kg/m2 Height: 154.9 cm (61\")  Weight: 73.5 kg (162 lb 0.6 oz) (08/16/24 0338)  Body mass index is 30.62 kg/m².   Adjusted BMI (if applicable)    BMI Category Obese, Class I (30 - 34.9)   Ideal Body Weight (IBW) 105#   Usual Body Weight (UBW) Unknown    Weight Trend Unknown   Weight History Wt Readings from Last 30 Encounters:   08/16/24 0338 73.5 kg (162 lb 0.6 oz)   08/15/24 1621 70.1 kg (154 lb 9.6 oz)        Estimated/Assessed Needs       Energy Requirements    Weight for Calculation 73.5kg   Method for Estimation  other method: 17-20kcal/kg   EST Needs (kcal/day) 4452-0369       Protein Requirements    Weight for Calculation 47.7kg   EST Protein Needs (g/kg) 1.2 - 1.5 gm/kg   EST Daily Needs (g/day) 40-72       Fluid Requirements     Method for Estimation 1 mL/kcal    Estimated Needs (mL/day) 5671-9855      Labs       Pertinent Labs    Results from last 7 days   Lab Units 08/16/24  0329 08/15/24  2121 08/15/24  1441   SODIUM mmol/L 154* 162* 164*   POTASSIUM mmol/L 4.1 3.5 3.8   CHLORIDE mmol/L 126* 129* 126*   CO2 mmol/L 17.0* 21.0* 24.2   BUN " "mg/dL 27* 30* 30*   CREATININE mg/dL 0.80 1.01* 1.28*   CALCIUM mg/dL 8.2* 8.9 10.4   BILIRUBIN mg/dL 0.6  --  0.9   ALK PHOS U/L 89  --  115   ALT (SGPT) U/L 38*  --  52*   AST (SGOT) U/L 32  --  46*   GLUCOSE mg/dL 127* 94 170*     Results from last 7 days   Lab Units 08/16/24  0452 08/16/24  0329 08/15/24  1519 08/15/24  1441   MAGNESIUM mg/dL  --   --   --  2.9*   PHOSPHORUS mg/dL  --   --   --  3.4   HEMOGLOBIN g/dL 11.2*  --   --  14.7   HEMOGLOBIN, POC   --   --    < >  --    HEMATOCRIT % 33.8*  --   --  44.8   HEMATOCRIT POC   --   --    < >  --    WBC 10*3/mm3 16.54*  --   --  21.24*   ALBUMIN g/dL  --  2.9*  --  3.8    < > = values in this interval not displayed.     Results from last 7 days   Lab Units 08/16/24  0452 08/15/24  1441   INR   --  1.24*   APTT seconds  --  23.5   PLATELETS 10*3/mm3 143 183     No results found for: \"COVID19\"  No results found for: \"HGBA1C\"       Medications           Scheduled Medications cefepime, 2,000 mg, Intravenous, Q8H  senna-docusate sodium, 2 tablet, Oral, BID  sodium chloride, 10 mL, Intravenous, Q12H       Infusions norepinephrine, 0.02-0.3 mcg/kg/min, Last Rate: Stopped (08/16/24 0806)  sodium chloride, 125 mL/hr, Last Rate: 125 mL/hr (08/16/24 1017)       PRN Medications   acetaminophen **OR** acetaminophen    aluminum-magnesium hydroxide-simethicone    senna-docusate sodium **AND** polyethylene glycol **AND** bisacodyl **AND** bisacodyl    Calcium Replacement - Follow Nurse / BPA Driven Protocol    Magnesium Standard Dose Replacement - Follow Nurse / BPA Driven Protocol    nitroglycerin    ondansetron ODT **OR** ondansetron    Phosphorus Replacement - Follow Nurse / BPA Driven Protocol    Potassium Replacement - Follow Nurse / BPA Driven Protocol    [COMPLETED] Insert Peripheral IV **AND** sodium chloride    sodium chloride    sodium chloride     Physical Findings          General Findings alert, confused, disoriented, overweight   Oral/Mouth Cavity tooth or " teeth missing   Edema  not assessed   Gastrointestinal normoactive   Skin  pressure injury: bilateral coccyx stage 2    Tubes/Drains/Lines none   NFPE No clinical signs of muscle wasting or fat loss   --  Current Nutrition Orders & Evaluation of Intake       Oral Nutrition     Food Allergies NKFA   Current PO Diet Diet: Regular/House; Texture: Pureed (NDD 1); Fluid Consistency: Honey Thick   Supplement n/a   PO Evaluation     % PO Intake No meals yet    Factors Affecting Intake: altered mental status   --  PES STATEMENT / NUTRITION DIAGNOSIS      Nutrition Dx Problem  Problem: Predicted Suboptimal Intake  Etiology: Medical Diagnosis - AMS    Signs/Symptoms: Report/Observation, SLP/Swallow Evaluation, and Other (comment) altered textures      NUTRITION INTERVENTION / PLAN OF CARE      Intervention Goal(s) Meet estimated needs, Establish PO intake, Accepts oral nutrition supplement, No significant weight loss, and PO intake goal %: 75         RD Intervention/Action Supplement provided, Encourage intake, Continue to monitor, and Recommend/order: magic cup    --      Prescription/Orders:       PO Diet       Supplements Magic cup BID       Enteral Nutrition       Parenteral Nutrition    New Prescription Ordered? Yes   --      Monitor/Evaluation Per protocol, I&O, PO intake, Supplement intake, Pertinent labs, Swallow function   Discharge Plan/Needs Pending clinical course   --    RD to follow per protocol.      Electronically signed by:  Dinorah Flor RD  08/16/24 15:21 EDT

## 2024-08-16 NOTE — THERAPY EVALUATION
Acute Care - Speech Language Pathology   Swallow Initial Evaluation [facility]     Patient Name: Mimi Pinto  : 1950  MRN: [id number]  Today's Date: 2024               Admit Date: 8/15/2024    Visit Dx:     ICD-10-CM ICD-9-CM   1. Septic shock  A41.9 038.9    R65.21 785.52     995.92   2. Hypernatremia  E87.0 276.0     Patient Active Problem List   Diagnosis    Hypernatremia     No past medical history on file.  No past surgical history on file.    SLP Recommendation and Plan  SLP Swallowing Diagnosis: oral dysphagia, suspected pharyngeal dysphagia (24)  SLP Diet Recommendation: puree, honey thick liquids (24)  Recommended Precautions and Strategies: upright posture during/after eating, small bites of food and sips of liquid (24)  SLP Rec. for Method of Medication Administration: meds crushed, with puree (24)     Monitor for Signs of Aspiration: yes, notify SLP if any concerns, other (see comments) (make NPO) (24)  Recommended Diagnostics: reassess via clinical swallow evaluation (24)  Swallow Criteria for Skilled Therapeutic Interventions Met: demonstrates skilled criteria (24)  Anticipated Discharge Disposition (SLP): unknown (24)  Rehab Potential/Prognosis, Swallowing: good, to achieve stated therapy goals (24)  Therapy Frequency (Swallow): PRN (24)  Predicted Duration Therapy Intervention (Days): until discharge (24)  Oral Care Recommendations: Oral Care before breakfast, after meals and PRN (24)                                        Outcome Evaluation: Swallow eval completed. Pt demonstrated decresed oral motor control and coordination. Pt tolerated ice chips. Weak coughing occurred with thin via spoon. Inconsistent coughing noted w/ NTL cup/straw. No s/s with pureed and HTL. Swallow was delayed, adequate elevation once initiated. Recommend pureed with HTL; meds  crushed in pureed; upright for meals and 30 min after; slow rate; small bites/sips. If s/s are noted, make NPO. ST to follow.      SWALLOW EVALUATION (Last 72 Hours)       SLP Adult Swallow Evaluation       Row Name 08/16/24 1200                   Rehab Evaluation    Document Type evaluation  -AW        Subjective Information no complaints  -AW        Patient Observations alert;cooperative;agree to therapy  -AW        Patient/Family/Caregiver Comments/Observations Pt's speech is dysarthric, difficulty formulating thoughts.  -AW        Patient Effort good  -AW        Symptoms Noted During/After Treatment none  -AW        Oral Care teeth brushed - suction toothbrush  -AW           General Information    Patient Profile Reviewed yes  -AW        Pertinent History Of Current Problem Pt admitted from a NH with severe hyponatremia, AMS, UTI, sepsis. Chest xray was negative.  -AW        Current Method of Nutrition NPO  -AW        Precautions/Limitations, Vision WFL;for purposes of eval  -AW        Precautions/Limitations, Hearing WFL;for purposes of eval  -AW        Prior Level of Function-Communication motor speech impairment  -AW        Prior Level of Function-Swallowing unknown  -AW        Plans/Goals Discussed with patient;agreed upon  -AW        Barriers to Rehab cognitive status  -AW        Patient's Goals for Discharge patient did not state  -AW           Pain    Additional Documentation Pain Scale: Numbers Pre/Post-Treatment (Group)  -AW           Pain Scale: Numbers Pre/Post-Treatment    Pretreatment Pain Rating 0/10 - no pain  -AW        Posttreatment Pain Rating 0/10 - no pain  -AW           Oral Motor Structure and Function    Dentition Assessment edentulous  -AW        Secretion Management WNL/WFL  -AW        Mucosal Quality moist, healthy  -AW           Oral Musculature and Cranial Nerve Assessment    Oral Labial or Buccal Impairment, Detail, Cranial Nerve VII (Facial): reduced ROM;reduced strength bilaterally   -AW        Lingual Impairment, Detail. Cranial Nerves IX, XII (Glossopharyngeal and Hypoglossal) reduced lingual ROM;reduced strength  -AW           General Eating/Swallowing Observations    Eating/Swallowing Skills fed by SLP  -AW        Positioning During Eating upright in bed  -AW        Utensils Used spoon;cup;straw  -AW        Consistencies Trialed ice chips;thin liquids;nectar/syrup-thick liquids;honey-thick liquids;pureed  -AW           Clinical Swallow Eval    Clinical Swallow Evaluation Summary Swallow eval completed. Pt demonstrated decresed oral motor control and coordination. Pt tolerated ice chips. Weak coughing occurred with thin via spoon. Inconsistent coughing noted w/ NTL cup/straw. No s/s with pureed and HTL. Swallow was delayed, adequate elevation once initiated. Recommend pureed with HTL; meds crushed in pureed; upright for meals and 30 min after; slow rate; small bites/sips. ST to follow.  -AW           SLP Evaluation Clinical Impression    SLP Swallowing Diagnosis oral dysphagia;suspected pharyngeal dysphagia  -AW        Functional Impact risk of aspiration/pneumonia  -AW        Rehab Potential/Prognosis, Swallowing good, to achieve stated therapy goals  -AW        Swallow Criteria for Skilled Therapeutic Interventions Met demonstrates skilled criteria  -AW           Recommendations    Therapy Frequency (Swallow) PRN  -AW        Predicted Duration Therapy Intervention (Days) until discharge  -AW        SLP Diet Recommendation puree;honey thick liquids  -AW        Recommended Diagnostics reassess via clinical swallow evaluation  -AW        Recommended Precautions and Strategies upright posture during/after eating;small bites of food and sips of liquid  -AW        Oral Care Recommendations Oral Care before breakfast, after meals and PRN  -AW        SLP Rec. for Method of Medication Administration meds crushed;with puree  -AW        Monitor for Signs of Aspiration yes;notify SLP if any  concerns;other (see comments)  make NPO  -AW        Anticipated Discharge Disposition (SLP) unknown  -AW           Swallow Goals (SLP)    Swallow STGs diet tolerance goal selection (SLP)  -AW           (STG) Patient will tolerate trials of    Consistencies Trialed (Tolerate trials) pureed textures;honey/ moderately thick liquids  -AW        Desired Outcome (Tolerate trials) without signs/symptoms of aspiration  -AW        Brandon (Tolerate trials) independently (over 90% accuracy)  -AW        Time Frame (Tolerate trials) by discharge  -AW                  User Key  (r) = Recorded By, (t) = Taken By, (c) = Cosigned By      Initials Name Effective Dates    Tammy Huerta SLP 08/28/23 -                     EDUCATION  The patient has been educated in the following areas:   Dysphagia (Swallowing Impairment) Oral Care/Hydration Modified Diet Instruction.        SLP GOALS       Row Name 08/16/24 1200             (STG) Patient will tolerate trials of    Consistencies Trialed (Tolerate trials) pureed textures;honey/ moderately thick liquids  -AW      Desired Outcome (Tolerate trials) without signs/symptoms of aspiration  -AW      Brandon (Tolerate trials) independently (over 90% accuracy)  -AW      Time Frame (Tolerate trials) by discharge  -AW                User Key  (r) = Recorded By, (t) = Taken By, (c) = Cosigned By      Initials Name Provider Type    Tammy Huerta SLP Speech and Language Pathologist                         Time Calculation:    Time Calculation- SLP       Row Name 08/16/24 1633             Time Calculation- SLP    SLP Start Time 1200  -AW      SLP Received On 08/16/24  -AW                User Key  (r) = Recorded By, (t) = Taken By, (c) = Cosigned By      Initials Name Provider Type    Tammy Huerta SLP Speech and Language Pathologist                    Therapy Charges for Today       Code Description Service Date Service Provider Modifiers Qty    [id number]  ST EVAL ORAL PHARYNG  SWALLOW 4 8/16/2024 Tammy Poole, SLP GN 1                 Tammy Poole, SLP  8/16/2024

## 2024-08-16 NOTE — PROGRESS NOTES
Clinical Pharmacy Services: Medication History    Mimi Pinto is a 73 y.o. female presenting to [facility] for Hypernatremia [E87.0]  Septic shock [A41.9, R65.21]    She  has no past medical history on file.    Allergies as of 08/15/2024 - Reviewed 08/15/2024   Allergen Reaction Noted    Penicillins Provider Review Needed 08/15/2024    Sulfa antibiotics Provider Review Needed 08/15/2024       Medication information was obtained from: group home paperwork  Pharmacy and Phone Number:     Prior to Admission Medications       Prescriptions Last Dose Informant Patient Reported? Taking?    docusate sodium (COLACE) 100 MG capsule 8/15/2024 Nursing Home Yes Yes    Take 1 capsule by mouth 2 (Two) Times a Day.    nystatin (MYCOSTATIN) 100,000 unit/mL suspension 8/15/2024 Nursing Home Yes Yes    Swish and spit 5 mL 4 (Four) Times a Day.    selenium sulfide (SELSUN) 1 % lotion 8/15/2024  Yes Yes    Apply 1 Application topically to the appropriate area as directed 2 (Two) Times a Day. Cleanse under bilateral breasts with selsun blue until healed              Medication notes:     This medication list is complete to the best of my knowledge as of 8/16/2024    Please call if questions.    Chen Hutchins, PharmD  8/16/2024 08:59 EDT

## 2024-08-17 NOTE — PROGRESS NOTES
" LOS: 2 days   Patient Care Team:  PersonYadira MD as PCP - General (Internal Medicine)    Chief Complaint: Hypernatremia    Subjective     History of Present Illness  Pt somnolent, confused.  Remains on ivf's.    Subjective    History taken from: patient chart RN    Objective     Vital Sign Min/Max for last 24 hours  Temp  Min: 97.9 °F (36.6 °C)  Max: 98.4 °F (36.9 °C)   BP  Min: 83/68  Max: 122/62   Pulse  Min: 82  Max: 100   Resp  Min: 22  Max: 22   SpO2  Min: 91 %  Max: 98 %   No data recorded   Weight  Min: 73.1 kg (161 lb 2.5 oz)  Max: 73.7 kg (162 lb 7.7 oz)     Flowsheet Rows      Flowsheet Row First Filed Value   Admission Height 154.9 cm (61\") Documented at 08/16/2024 1000   Admission Weight 70.1 kg (154 lb 9.6 oz) Documented at 08/15/2024 1621            No intake/output data recorded.  I/O last 3 completed shifts:  In: 3395 [I.V.:3295; IV Piggyback:100]  Out: 1265 [Urine:1265]    Objective:  General Appearance:  Comfortable.    Vital signs: (most recent): Blood pressure 100/56, pulse 79, temperature 98.5 °F (36.9 °C), resp. rate 20, height 154.9 cm (61\"), weight 73.1 kg (161 lb 2.5 oz), SpO2 95%.  Vital signs are normal.    HEENT: Normal HEENT exam.    Lungs:  Normal effort and normal respiratory rate.    Heart: Normal rate.  Regular rhythm.    Abdomen: Abdomen is soft.  Bowel sounds are normal.   There is no abdominal tenderness.     Extremities: Normal range of motion.    Pulses: Distal pulses are intact.    Neurological: Patient is alert and oriented to person, place and time.    Pupils:  Pupils are equal, round, and reactive to light.    Skin:  Warm and dry.                Results Review:     I reviewed the patient's new clinical results.  I reviewed the patient's new imaging results and agree with the interpretation.  I reviewed the patient's other test results and agree with the interpretation    WBC WBC   Date Value Ref Range Status   08/17/2024 18.96 (H) 3.40 - 10.80 10*3/mm3 Final " "  08/16/2024 16.54 (H) 3.40 - 10.80 10*3/mm3 Final   08/15/2024 21.24 (H) 3.40 - 10.80 10*3/mm3 Final      HGB Hemoglobin   Date Value Ref Range Status   08/17/2024 10.2 (L) 12.0 - 15.9 g/dL Final   08/16/2024 11.2 (L) 12.0 - 15.9 g/dL Final   08/15/2024 14.8 12.0 - 17.0 g/dL Final   08/15/2024 14.7 12.0 - 15.9 g/dL Final      HCT Hematocrit   Date Value Ref Range Status   08/17/2024 29.9 (L) 34.0 - 46.6 % Final   08/16/2024 33.8 (L) 34.0 - 46.6 % Final   08/15/2024 43 38 - 51 % Final   08/15/2024 44.8 34.0 - 46.6 % Final      Platlets No results found for: \"LABPLAT\"   MCV MCV   Date Value Ref Range Status   08/17/2024 89.3 79.0 - 97.0 fL Final   08/16/2024 91.8 79.0 - 97.0 fL Final   08/15/2024 90.5 79.0 - 97.0 fL Final          Sodium Sodium   Date Value Ref Range Status   08/17/2024 154 (H) 136 - 145 mmol/L Final   08/16/2024 155 (H) 136 - 145 mmol/L Final   08/16/2024 154 (H) 136 - 145 mmol/L Final   08/15/2024 162 (C) 136 - 145 mmol/L Final   08/15/2024 164 (C) 136 - 145 mmol/L Final      Potassium Potassium   Date Value Ref Range Status   08/17/2024 3.5 3.5 - 5.2 mmol/L Final   08/17/2024 3.5 3.5 - 5.2 mmol/L Final   08/16/2024 3.5 3.5 - 5.2 mmol/L Final   08/16/2024 4.1 3.5 - 5.2 mmol/L Final     Comment:     Slight hemolysis detected by analyzer. Result may be falsely elevated.   08/15/2024 3.5 3.5 - 5.2 mmol/L Final   08/15/2024 3.8 3.5 - 5.2 mmol/L Final     Comment:     Slight hemolysis detected by analyzer. Result may be falsely elevated.      Chloride Chloride   Date Value Ref Range Status   08/17/2024 124 (H) 98 - 107 mmol/L Final   08/16/2024 125 (H) 98 - 107 mmol/L Final   08/16/2024 126 (H) 98 - 107 mmol/L Final   08/15/2024 129 (H) 98 - 107 mmol/L Final   08/15/2024 126 (H) 98 - 107 mmol/L Final      CO2 CO2   Date Value Ref Range Status   08/17/2024 20.0 (L) 22.0 - 29.0 mmol/L Final   08/16/2024 17.6 (L) 22.0 - 29.0 mmol/L Final   08/16/2024 17.0 (L) 22.0 - 29.0 mmol/L Final   08/15/2024 21.0 (L) " "22.0 - 29.0 mmol/L Final   08/15/2024 24.2 22.0 - 29.0 mmol/L Final      BUN BUN   Date Value Ref Range Status   08/17/2024 20 8 - 23 mg/dL Final   08/16/2024 23 8 - 23 mg/dL Final   08/16/2024 27 (H) 8 - 23 mg/dL Final   08/15/2024 30 (H) 8 - 23 mg/dL Final   08/15/2024 30 (H) 8 - 23 mg/dL Final      Creatinine Creatinine   Date Value Ref Range Status   08/17/2024 0.75 0.57 - 1.00 mg/dL Final   08/16/2024 0.81 0.57 - 1.00 mg/dL Final   08/16/2024 0.80 0.57 - 1.00 mg/dL Final   08/15/2024 1.01 (H) 0.57 - 1.00 mg/dL Final   08/15/2024 1.28 (H) 0.57 - 1.00 mg/dL Final      Calcium Calcium   Date Value Ref Range Status   08/17/2024 8.2 (L) 8.6 - 10.5 mg/dL Final   08/16/2024 8.4 (L) 8.6 - 10.5 mg/dL Final   08/16/2024 8.2 (L) 8.6 - 10.5 mg/dL Final   08/15/2024 8.9 8.6 - 10.5 mg/dL Final   08/15/2024 10.4 8.6 - 10.5 mg/dL Final      PO4 No results found for: \"CAPO4\"   Albumin Albumin   Date Value Ref Range Status   08/17/2024 2.6 (L) 3.5 - 5.2 g/dL Final   08/16/2024 2.9 (L) 3.5 - 5.2 g/dL Final   08/15/2024 3.8 3.5 - 5.2 g/dL Final      Magnesium Magnesium   Date Value Ref Range Status   08/15/2024 2.9 (H) 1.6 - 2.4 mg/dL Final      Uric Acid No results found for: \"URICACID\"     Medication Review:   cefepime, 2,000 mg, Intravenous, Q8H  senna-docusate sodium, 2 tablet, Oral, BID  sodium chloride, 10 mL, Intravenous, Q12H          Assessment & Plan       Hypernatremia      Assessment & Plan  Hypernatremia:  due to volume contraction.  Getting 1/2 NS at 150cc/hr.  Elevated at 154.  Will change to d5w.  Monitor.  Sepsis with lactic acidosis on cefepime  Mental status changes:  improving but still confused  Hypotension:  on levophed that is being weaned, and IVF    Garland Perear MD  08/17/24  07:05 EDT          "

## 2024-08-17 NOTE — PROGRESS NOTES
Dr. LE Arrieta    [facility] CORONARY CARE        Patient ID:  Name:  Mimi Pinto  MRN:  [id number]  1950  73 y.o.  female            CC/Reason for visit: Hyponatremia, altered mental status    Interval hx: Patient is confused.  She does not follow commands.  When I asked her to lift up her hands and arms she mumbled incomprehensible words.  There is spontaneous movement of the left arm but not much of the right arm.  When I asked her to move both legs or feet, she did not comprehend and did not move on command.  I reviewed notes by Dr. Brown and other medical providers.  Patient was admitted with severe hyponatremia and altered mental status.  She was recently in the hospital with rhabdomyolysis.  Was discharged to rehab with confusion.  Home medication reviewed, no obvious offending agents found    ROS: Unobtainable due to confusion    I reviewed old medical records.  Past medical history, social history and family history: Unchanged from admission H&P.      Vitals:  Vitals:    08/17/24 0507 08/17/24 0527 08/17/24 0600 08/17/24 0700   BP: (!) 83/68  122/62    Pulse: 82  85    Resp:       Temp:    98.5 °F (36.9 °C)   TempSrc:       SpO2: 96%  95%    Weight:  73.1 kg (161 lb 2.5 oz)     Height:               Body mass index is 30.45 kg/m².    Intake/Output Summary (Last 24 hours) at 8/17/2024 0817  Last data filed at 8/17/2024 0600  Gross per 24 hour   Intake --   Output 840 ml   Net -840 ml       Exam:  GEN:  No distress  Alert, but has garbled speech, incomprehensible speech.  Confused, not following commands  LUNGS: Clear breath sounds bilat, no use of accessory muscles  CV:  Normal S1S2, without murmur, no edema  ABD:  Non tender, no enlarged liver or masses      Scheduled meds:  cefepime, 2,000 mg, Intravenous, Q8H  senna-docusate sodium, 2 tablet, Oral, BID  sodium chloride, 10 mL, Intravenous, Q12H      IV meds:                      norepinephrine, 0.02-0.3 mcg/kg/min, Last Rate: Stopped  "(08/16/24 0806)  sodium chloride, 150 mL/hr, Last Rate: 150 mL/hr (08/16/24 1915)        Data Review:   I reviewed the patient's medications and new clinical results.    No results found for: \"COVID19\"      Lab Results   Component Value Date    CALCIUM 8.2 (L) 08/17/2024    PHOS 3.4 08/15/2024    MG 2.9 (H) 08/15/2024    MG 2.1 07/29/2024    MG 1.5 (L) 07/28/2024     Results from last 7 days   Lab Units 08/17/24  0510 08/16/24  1752 08/16/24  0452 08/16/24  0329 08/15/24  2121 08/15/24  1519 08/15/24  1441   SODIUM mmol/L 154* 155*  --  154*   < >  --  164*   POTASSIUM mmol/L 3.5  3.5 3.5  --  4.1   < >  --  3.8   CHLORIDE mmol/L 124* 125*  --  126*   < >  --  126*   CO2 mmol/L 20.0* 17.6*  --  17.0*   < >  --  24.2   BUN mg/dL 20 23  --  27*   < >  --  30*   CREATININE mg/dL 0.75 0.81  --  0.80   < >  --  1.28*   CALCIUM mg/dL 8.2* 8.4*  --  8.2*   < >  --  10.4   BILIRUBIN mg/dL 0.8  --   --  0.6  --   --  0.9   ALK PHOS U/L 84  --   --  89  --   --  115   ALT (SGPT) U/L 36*  --   --  38*  --   --  52*   AST (SGOT) U/L 34*  --   --  32  --   --  46*   GLUCOSE mg/dL 89 109*  --  127*   < >  --  170*   WBC 10*3/mm3 18.96*  --  16.54*  --   --   --  21.24*   HEMOGLOBIN g/dL 10.2*  --  11.2*  --   --   --  14.7   HEMOGLOBIN, POC g/dL  --   --   --   --   --  14.8  --    PLATELETS 10*3/mm3 123*  --  143  --   --   --  183   INR   --   --   --   --   --   --  1.24*   PROBNP pg/mL  --   --   --   --   --   --  631.0   PROCALCITONIN ng/mL  --   --   --   --   --   --  0.40*    < > = values in this interval not displayed.     Results from last 7 days   Lab Units 08/15/24  1548 08/15/24  1537   BLOODCX  No growth at 24 hours No growth at 24 hours         Results from last 7 days   Lab Units 08/15/24  2121 08/15/24  1441   CK TOTAL U/L  --  157   HSTROP T ng/L 30* 30*            ASSESSMENT:   Severe Hypernatremia  Severe Hyperchloremia  Lactic acidosis  Sepsis  HLD  OA  Depression  Anxiety  UTI  Altered Mental " Status  Recent prolonged hospitalization after being found down with rhabdomyolysis and uti  Granulomatous disease significant on chest imaging  Thrombocytopenia  Stool impaction      PLAN:  Patient and all problems new to me.  Her encephalopathy continues.  Likely has baseline dementia.  Was recently in the hospital, prolonged stay, found to have rhabdomyolysis and urinary tract infection.  At this time we will repeat procalcitonin.  Monitor white count and temperature curve.  On presentation she had leukocytosis, elevated lactic acid and elevated procalcitonin.  Her urinalysis was unremarkable.  Blood cultures negative so far.  CT abdomen and pelvis images reviewed.  Lower lung bases did have some mild infiltrate but she also has extensive calcified granulomatous disease.  Currently on empiric antibiotics, cefepime IV.  We will stop this and monitor for any recurrent signs or symptoms of sepsis.  Patient had urinary retention on CT scan of the abdomen and pelvis when admitted.  She only has external urinary catheter.  I will perform bladder scan every 8 hours.  Sodium levels are gradually improving with IV fluids.  Continue to check renal panel daily.  CT abdomen pelvis also showed stool impaction.  Will give 1 or 2 doses of Dulcolax suppository.  This patient has several chronic medical conditions, and now several acute medical illnesses.  Extensive amount of data was reviewed.  Images were directly visualized by me.  This patient warrants high complexity medical decision-making.        I reviewed the chart and other providers notes and reviewed labs.  Copied text in this note has been reviewed and is accurate as of today      Eugenio Arrieta MD  8/17/2024

## 2024-08-17 NOTE — PLAN OF CARE
Goal Outcome Evaluation: Pt remains in CCU in telemetry status on IVF . Pt not following commands at this time. Potassium replaced this shift. UO: 1400

## 2024-08-18 NOTE — PROGRESS NOTES
Dr. LE Arrieta    [facility] CORONARY CARE        Patient ID:  Name:  Mimi Pinto  MRN:  [id number]  1950  73 y.o.  female            CC/Reason for visit: Hyponatremia, altered mental status     Interval hx: Patient is not answering questions.  Confused, moaning and groaning but does not speak coherently to me.  Very mumbled speech      ROS: Unobtainable, patient confused and mumbling    Vitals:  Vitals:    08/18/24 0606 08/18/24 0745 08/18/24 1021 08/18/24 1216   BP:  (!) 88/51 100/54 92/64   BP Location:  Left arm  Left arm   Patient Position:  Lying  Lying   Pulse:  73 73 85   Resp:  16  16   Temp:  97.7 °F (36.5 °C)  97.6 °F (36.4 °C)   TempSrc:  Oral  Axillary   SpO2:  96% 93% 93%   Weight: 74.2 kg (163 lb 9.3 oz)      Height:               Body mass index is 30.91 kg/m².    Intake/Output Summary (Last 24 hours) at 8/18/2024 1346  Last data filed at 8/18/2024 1200  Gross per 24 hour   Intake 300 ml   Output 600 ml   Net -300 ml       Exam:  GEN:  Appears chronically ill  Alert, awake, mumbles, confused, not following command  LUNGS: Overall diminished but clear breath sounds bilat, no use of accessory muscles  CV:  Normal S1S2, without murmur, no edema  ABD:  Non tender, no enlarged liver or masses      Scheduled meds:  potassium chloride, 10 mEq, Intravenous, Q1H  senna-docusate sodium, 2 tablet, Oral, BID  sodium chloride, 10 mL, Intravenous, Q12H      IV meds:                      dextrose, 125 mL/hr, Last Rate: 125 mL/hr (08/18/24 1129)        Data Review:   I reviewed the patient's medications and new clinical results.          Lab Results   Component Value Date    CALCIUM 8.2 (L) 08/18/2024    PHOS 1.8 (C) 08/18/2024    MG 2.1 08/18/2024    MG 2.9 (H) 08/15/2024    MG 2.1 07/29/2024     Results from last 7 days   Lab Units 08/18/24  0925 08/18/24  0900 08/17/24  0510 08/16/24  1752 08/16/24  0452 08/16/24  0329 08/15/24  1519 08/15/24  1441   SODIUM mmol/L  --  144 154* 155*  --  154*   <  > 164*   POTASSIUM mmol/L  --  2.9* 3.5  3.5 3.5  --  4.1   < > 3.8   CHLORIDE mmol/L  --  114* 124* 125*  --  126*   < > 126*   CO2 mmol/L  --  20.0* 20.0* 17.6*  --  17.0*   < > 24.2   BUN mg/dL  --  11 20 23  --  27*   < > 30*   CREATININE mg/dL  --  0.63 0.75 0.81  --  0.80   < > 1.28*   CALCIUM mg/dL  --  8.2* 8.2* 8.4*  --  8.2*   < > 10.4   BILIRUBIN mg/dL  --  0.6 0.8  --   --  0.6  --  0.9   ALK PHOS U/L  --  90 84  --   --  89  --  115   ALT (SGPT) U/L  --  38* 36*  --   --  38*  --  52*   AST (SGOT) U/L  --  44* 34*  --   --  32  --  46*   GLUCOSE mg/dL  --  144* 89 109*  --  127*   < > 170*   WBC 10*3/mm3 15.07*  --  18.96*  --  16.54*  --   --  21.24*   HEMOGLOBIN g/dL 9.8*  --  10.2*  --  11.2*  --   --  14.7   HEMOGLOBIN, POC   --   --   --   --   --   --    < >  --    PLATELETS 10*3/mm3 138*  --  123*  --  143  --   --  183   INR   --   --   --   --   --   --   --  1.24*   PROBNP pg/mL  --   --   --   --   --   --   --  631.0   PROCALCITONIN ng/mL  --  0.33*  --   --   --   --   --  0.40*    < > = values in this interval not displayed.     Results from last 7 days   Lab Units 08/15/24  1548 08/15/24  1537   BLOODCX  No growth at 2 days No growth at 2 days         Results from last 7 days   Lab Units 08/15/24  2121 08/15/24  1441   CK TOTAL U/L  --  157   HSTROP T ng/L 30* 30*          ASSESSMENT:   Severe Hypernatremia  Severe Hyperchloremia  Lactic acidosis  Sepsis  HLD  OA  Depression  Anxiety  UTI  Altered Mental Status  Recent prolonged hospitalization after being found down with rhabdomyolysis and uti  Granulomatous disease significant on chest imaging  Thrombocytopenia  Stool impaction      PLAN:  Continues to be confused.  Unclear whether this is acute delirium or underlying dementia or both.  Sodium has normalized.  Potassium is low, continue replacement of phosphorus potassium and magnesium via protocol.  Metabolic acidosis is gradually improving.  Continue checking daily renal function  panel  Has received IV fluids.  Followed by nephrology.  Mild thrombocytopenia is improving.  Continue daily CBC checks.  Anemia remains stable.  Cefepime was stopped yesterday since it sometimes can cause some confusion.  Continue to monitor white count, temperature curve and procalcitonin levels.  CT of the abdomen did show urinary retention and stool impaction.  She was given laxatives in the form of suppository.  Continue bladder scanning every 8 hours.  Perform intermittent urinary catheterization if needed depending on bladder scan results.  Patient is telemetry status          Eugenio Arrieta MD  8/18/2024

## 2024-08-18 NOTE — PLAN OF CARE
Pt. VS WNL. No c/o pain. Pt. A&O x1, otherwise pleasantly confused. Pt. Receiving IVFs and IV electrolyte replacement. Pt. Q2 turns. Pt. With purewick, adequate UOP. Pt. Encouraged oral intake, minimal intake noted. Pt. Resting comfortably at present, will continue to monitor closely for the remainder of this RN's shift.      Problem: Adult Inpatient Plan of Care  Goal: Plan of Care Review  Outcome: Ongoing, Progressing  Flowsheets (Taken 8/18/2024 1841)  Progress: no change  Plan of Care Reviewed With: patient

## 2024-08-18 NOTE — PROGRESS NOTES
" LOS: 3 days   Patient Care Team:  PersonYadira MD as PCP - General (Internal Medicine)    Chief Complaint: Hypernatremia    Subjective     History of Present Illness  Pt more awake today, still confused  No new events, poor po.  Remains on ivf's.    Subjective    History taken from: patient chart RN    Objective     Vital Sign Min/Max for last 24 hours  Temp  Min: 97.6 °F (36.4 °C)  Max: 98.2 °F (36.8 °C)   BP  Min: 87/47  Max: 108/86   Pulse  Min: 69  Max: 85   Resp  Min: 16  Max: 20   SpO2  Min: 95 %  Max: 99 %   Flow (L/min)  Min: 0  Max: 0   Weight  Min: 74.2 kg (163 lb 9.3 oz)  Max: 74.2 kg (163 lb 9.3 oz)     Flowsheet Rows      Flowsheet Row First Filed Value   Admission Height 154.9 cm (61\") Documented at 08/16/2024 1000   Admission Weight 70.1 kg (154 lb 9.6 oz) Documented at 08/15/2024 1621            No intake/output data recorded.  I/O last 3 completed shifts:  In: -   Out: 1840 [Urine:1840]    Objective:  General Appearance:  Comfortable.    Vital signs: (most recent): Blood pressure (!) 88/51, pulse 73, temperature 97.7 °F (36.5 °C), temperature source Oral, resp. rate 16, height 154.9 cm (61\"), weight 74.2 kg (163 lb 9.3 oz), SpO2 96%.  Vital signs are normal.    HEENT: Normal HEENT exam.    Lungs:  Normal effort and normal respiratory rate.    Heart: Normal rate.  Regular rhythm.    Abdomen: Abdomen is soft.  Bowel sounds are normal.   There is no abdominal tenderness.     Extremities: Normal range of motion.    Pulses: Distal pulses are intact.    Neurological: Patient is alert and oriented to person, place and time.    Pupils:  Pupils are equal, round, and reactive to light.    Skin:  Warm and dry.              Results Review:     I reviewed the patient's new clinical results.  I reviewed the patient's new imaging results and agree with the interpretation.  I reviewed the patient's other test results and agree with the interpretation    WBC WBC   Date Value Ref Range Status   08/18/2024 " "15.07 (H) 3.40 - 10.80 10*3/mm3 Final   08/17/2024 18.96 (H) 3.40 - 10.80 10*3/mm3 Final   08/16/2024 16.54 (H) 3.40 - 10.80 10*3/mm3 Final   08/15/2024 21.24 (H) 3.40 - 10.80 10*3/mm3 Final      HGB Hemoglobin   Date Value Ref Range Status   08/18/2024 9.8 (L) 12.0 - 15.9 g/dL Final   08/17/2024 10.2 (L) 12.0 - 15.9 g/dL Final   08/16/2024 11.2 (L) 12.0 - 15.9 g/dL Final   08/15/2024 14.8 12.0 - 17.0 g/dL Final   08/15/2024 14.7 12.0 - 15.9 g/dL Final      HCT Hematocrit   Date Value Ref Range Status   08/18/2024 29.7 (L) 34.0 - 46.6 % Final   08/17/2024 29.9 (L) 34.0 - 46.6 % Final   08/16/2024 33.8 (L) 34.0 - 46.6 % Final   08/15/2024 43 38 - 51 % Final   08/15/2024 44.8 34.0 - 46.6 % Final      Platlets No results found for: \"LABPLAT\"   MCV MCV   Date Value Ref Range Status   08/18/2024 88.9 79.0 - 97.0 fL Final   08/17/2024 89.3 79.0 - 97.0 fL Final   08/16/2024 91.8 79.0 - 97.0 fL Final   08/15/2024 90.5 79.0 - 97.0 fL Final          Sodium Sodium   Date Value Ref Range Status   08/17/2024 154 (H) 136 - 145 mmol/L Final   08/16/2024 155 (H) 136 - 145 mmol/L Final   08/16/2024 154 (H) 136 - 145 mmol/L Final   08/15/2024 162 (C) 136 - 145 mmol/L Final   08/15/2024 164 (C) 136 - 145 mmol/L Final      Potassium Potassium   Date Value Ref Range Status   08/17/2024 3.5 3.5 - 5.2 mmol/L Final   08/17/2024 3.5 3.5 - 5.2 mmol/L Final   08/16/2024 3.5 3.5 - 5.2 mmol/L Final   08/16/2024 4.1 3.5 - 5.2 mmol/L Final     Comment:     Slight hemolysis detected by analyzer. Result may be falsely elevated.   08/15/2024 3.5 3.5 - 5.2 mmol/L Final   08/15/2024 3.8 3.5 - 5.2 mmol/L Final     Comment:     Slight hemolysis detected by analyzer. Result may be falsely elevated.      Chloride Chloride   Date Value Ref Range Status   08/17/2024 124 (H) 98 - 107 mmol/L Final   08/16/2024 125 (H) 98 - 107 mmol/L Final   08/16/2024 126 (H) 98 - 107 mmol/L Final   08/15/2024 129 (H) 98 - 107 mmol/L Final   08/15/2024 126 (H) 98 - 107 " "mmol/L Final      CO2 CO2   Date Value Ref Range Status   08/17/2024 20.0 (L) 22.0 - 29.0 mmol/L Final   08/16/2024 17.6 (L) 22.0 - 29.0 mmol/L Final   08/16/2024 17.0 (L) 22.0 - 29.0 mmol/L Final   08/15/2024 21.0 (L) 22.0 - 29.0 mmol/L Final   08/15/2024 24.2 22.0 - 29.0 mmol/L Final      BUN BUN   Date Value Ref Range Status   08/17/2024 20 8 - 23 mg/dL Final   08/16/2024 23 8 - 23 mg/dL Final   08/16/2024 27 (H) 8 - 23 mg/dL Final   08/15/2024 30 (H) 8 - 23 mg/dL Final   08/15/2024 30 (H) 8 - 23 mg/dL Final      Creatinine Creatinine   Date Value Ref Range Status   08/17/2024 0.75 0.57 - 1.00 mg/dL Final   08/16/2024 0.81 0.57 - 1.00 mg/dL Final   08/16/2024 0.80 0.57 - 1.00 mg/dL Final   08/15/2024 1.01 (H) 0.57 - 1.00 mg/dL Final   08/15/2024 1.28 (H) 0.57 - 1.00 mg/dL Final      Calcium Calcium   Date Value Ref Range Status   08/17/2024 8.2 (L) 8.6 - 10.5 mg/dL Final   08/16/2024 8.4 (L) 8.6 - 10.5 mg/dL Final   08/16/2024 8.2 (L) 8.6 - 10.5 mg/dL Final   08/15/2024 8.9 8.6 - 10.5 mg/dL Final   08/15/2024 10.4 8.6 - 10.5 mg/dL Final      PO4 No results found for: \"CAPO4\"   Albumin Albumin   Date Value Ref Range Status   08/17/2024 2.6 (L) 3.5 - 5.2 g/dL Final   08/16/2024 2.9 (L) 3.5 - 5.2 g/dL Final   08/15/2024 3.8 3.5 - 5.2 g/dL Final      Magnesium Magnesium   Date Value Ref Range Status   08/15/2024 2.9 (H) 1.6 - 2.4 mg/dL Final      Uric Acid No results found for: \"URICACID\"     Medication Review:   senna-docusate sodium, 2 tablet, Oral, BID  sodium chloride, 10 mL, Intravenous, Q12H          Assessment & Plan       Hypernatremia      Assessment & Plan  Hypernatremia:  due to volume contraction.  Getting d5w at 150cc/hr.  Elevated at 154.  Bmp pending today.  Monitor.  Sepsis with lactic acidosis on cefepime  Mental status changes:  improving but still confused  Hypotension:  off pressors now.    Garland Perera MD  08/18/24  09:37 EDT          "

## 2024-08-19 NOTE — PROGRESS NOTES
" LOS: 4 days   Patient Care Team:  PersonYadira MD as PCP - General (Internal Medicine)    Chief Complaint: Hypernatremia    Subjective     History of Present Illness  Pt somnolent   As per nursing staff more aware able to follow simple commands   Minimal oral intake     Subjective    History taken from: patient chart RN    Objective     Vital Sign Min/Max for last 24 hours  Temp  Min: 97.6 °F (36.4 °C)  Max: 98.8 °F (37.1 °C)   BP  Min: 88/51  Max: 127/70   Pulse  Min: 73  Max: 102   Resp  Min: 15  Max: 16   SpO2  Min: 93 %  Max: 97 %   No data recorded   Weight  Min: 73.2 kg (161 lb 6 oz)  Max: 73.2 kg (161 lb 6 oz)     Flowsheet Rows      Flowsheet Row First Filed Value   Admission Height 154.9 cm (61\") Documented at 08/16/2024 1000   Admission Weight 70.1 kg (154 lb 9.6 oz) Documented at 08/15/2024 1621            No intake/output data recorded.  I/O last 3 completed shifts:  In: 450 [P.O.:450]  Out: 3000 [Urine:3000]      Objective:  General Appearance:  In no acute distress.    Vital signs: (most recent): Blood pressure 127/70, pulse 90, temperature 98.2 °F (36.8 °C), temperature source Axillary, resp. rate 15, height 154.9 cm (61\"), weight 73.2 kg (161 lb 6 oz), SpO2 95%.  Vital signs are normal.    HEENT: Normal HEENT exam.    Lungs:  Normal effort and normal respiratory rate.    Heart: Normal rate.  Regular rhythm.    Abdomen: Abdomen is soft.  Bowel sounds are normal.   There is no abdominal tenderness.     Extremities: Normal range of motion.    Pulses: Distal pulses are intact.    Neurological: (Somnolent ).    Pupils:  Pupils are equal, round, and reactive to light.    Skin:  Warm and dry.              Results Review:     I reviewed the patient's new clinical results.  I reviewed the patient's new imaging results and agree with the interpretation.  I reviewed the patient's other test results and agree with the interpretation    WBC WBC   Date Value Ref Range Status   08/18/2024 15.07 (H) 3.40 - " "10.80 10*3/mm3 Final   08/17/2024 18.96 (H) 3.40 - 10.80 10*3/mm3 Final      HGB Hemoglobin   Date Value Ref Range Status   08/18/2024 9.8 (L) 12.0 - 15.9 g/dL Final   08/17/2024 10.2 (L) 12.0 - 15.9 g/dL Final      HCT Hematocrit   Date Value Ref Range Status   08/18/2024 29.7 (L) 34.0 - 46.6 % Final   08/17/2024 29.9 (L) 34.0 - 46.6 % Final      Platlets No results found for: \"LABPLAT\"   MCV MCV   Date Value Ref Range Status   08/18/2024 88.9 79.0 - 97.0 fL Final   08/17/2024 89.3 79.0 - 97.0 fL Final          Sodium Sodium   Date Value Ref Range Status   08/19/2024 142 136 - 145 mmol/L Final   08/18/2024 144 136 - 145 mmol/L Final   08/17/2024 154 (H) 136 - 145 mmol/L Final   08/16/2024 155 (H) 136 - 145 mmol/L Final      Potassium Potassium   Date Value Ref Range Status   08/19/2024 3.0 (L) 3.5 - 5.2 mmol/L Final   08/18/2024 3.3 (L) 3.5 - 5.2 mmol/L Final   08/18/2024 2.9 (L) 3.5 - 5.2 mmol/L Final   08/17/2024 3.5 3.5 - 5.2 mmol/L Final   08/17/2024 3.5 3.5 - 5.2 mmol/L Final   08/16/2024 3.5 3.5 - 5.2 mmol/L Final      Chloride Chloride   Date Value Ref Range Status   08/19/2024 113 (H) 98 - 107 mmol/L Final   08/18/2024 114 (H) 98 - 107 mmol/L Final   08/17/2024 124 (H) 98 - 107 mmol/L Final   08/16/2024 125 (H) 98 - 107 mmol/L Final      CO2 CO2   Date Value Ref Range Status   08/19/2024 17.0 (L) 22.0 - 29.0 mmol/L Final   08/18/2024 20.0 (L) 22.0 - 29.0 mmol/L Final   08/17/2024 20.0 (L) 22.0 - 29.0 mmol/L Final   08/16/2024 17.6 (L) 22.0 - 29.0 mmol/L Final      BUN BUN   Date Value Ref Range Status   08/19/2024 8 8 - 23 mg/dL Final   08/18/2024 11 8 - 23 mg/dL Final   08/17/2024 20 8 - 23 mg/dL Final   08/16/2024 23 8 - 23 mg/dL Final      Creatinine Creatinine   Date Value Ref Range Status   08/19/2024 0.50 (L) 0.57 - 1.00 mg/dL Final   08/18/2024 0.63 0.57 - 1.00 mg/dL Final   08/17/2024 0.75 0.57 - 1.00 mg/dL Final   08/16/2024 0.81 0.57 - 1.00 mg/dL Final      Calcium Calcium   Date Value Ref Range " "Status   08/19/2024 7.8 (L) 8.6 - 10.5 mg/dL Final   08/18/2024 8.2 (L) 8.6 - 10.5 mg/dL Final   08/17/2024 8.2 (L) 8.6 - 10.5 mg/dL Final   08/16/2024 8.4 (L) 8.6 - 10.5 mg/dL Final      PO4 No results found for: \"CAPO4\"   Albumin Albumin   Date Value Ref Range Status   08/18/2024 2.5 (L) 3.5 - 5.2 g/dL Final   08/17/2024 2.6 (L) 3.5 - 5.2 g/dL Final      Magnesium Magnesium   Date Value Ref Range Status   08/18/2024 2.1 1.6 - 2.4 mg/dL Final      Uric Acid No results found for: \"URICACID\"     Lab Results   Lab Value Date/Time     08/19/2024 0427     08/18/2024 0900     (H) 08/17/2024 0510     (H) 08/16/2024 1752     (H) 08/16/2024 0329     (C) 08/15/2024 2121     (C) 08/15/2024 1441       Medication Review:   senna-docusate sodium, 2 tablet, Oral, BID  sodium chloride, 10 mL, Intravenous, Q12H  sodium phosphate, 15 mmol, Intravenous, Q3H          Assessment & Plan       Hypernatremia      Assessment & Plan  Hypernatremia : improved   Sepsis with lactic acidosis on cefepime  Encephalopathy  from hyperNa , as per primary team   Hypotension:  off pressors now.  Hypokalemia w hypophosphatemia , order replacement ,     Plan   -d/c dextrose infusion   - order lactate ringer w KCL replacement   - Order NaPhos replacement IV   - Will repeat renal panel later today     Discussed w/ nursing staff    Thank you for allowing us to participate from this patient care     Marc Ortiz MD  08/19/24  07:38 EDT          "

## 2024-08-19 NOTE — PROGRESS NOTES
"Nutrition Services    Patient Name:  Mimi Pinto  YOB: 1950  MRN: [id number]  Admit Date:  8/15/2024  Assessment Date:  08/19/24    Summary: Follow up  Po intake poor,  <25%. Pt Confused.   IVF's at 50ml/hr.   Labs/skin reviewed. Na 142, glu 163, phos 1.9  Has supplements ordered    Plan/Recommendation  - magic cup TID   - encourage oral intakes    RD to follow     CLINICAL NUTRITION ASSESSMENT      Reason for Assessment Follow-up Protocol     Diagnosis/Problem   Hypernatremia    Medical/Surgical History History reviewed. No pertinent past medical history.    History reviewed. No pertinent surgical history.     Anthropometrics        Current Height  Current Weight  BMI kg/m2 Height: 154.9 cm (61\")  Weight: 73.2 kg (161 lb 6 oz) (08/19/24 0601)  Body mass index is 30.49 kg/m².   Adjusted BMI (if applicable)    BMI Category Obese, Class I (30 - 34.9)   Ideal Body Weight (IBW) 105#   Usual Body Weight (UBW) Unknown    Weight Trend Unknown   Weight History Wt Readings from Last 30 Encounters:   08/19/24 0601 73.2 kg (161 lb 6 oz)   08/18/24 0606 74.2 kg (163 lb 9.3 oz)   08/17/24 0527 73.1 kg (161 lb 2.5 oz)   08/16/24 2030 73.7 kg (162 lb 7.7 oz)   08/16/24 0338 73.5 kg (162 lb 0.6 oz)   08/15/24 1621 70.1 kg (154 lb 9.6 oz)        Estimated/Assessed Needs       Energy Requirements    Weight for Calculation 73.5kg   Method for Estimation  other method: 17-20kcal/kg   EST Needs (kcal/day) 3140-0806       Protein Requirements    Weight for Calculation 47.7kg   EST Protein Needs (g/kg) 1.2 - 1.5 gm/kg   EST Daily Needs (g/day) 40-72       Fluid Requirements     Method for Estimation 1 mL/kcal    Estimated Needs (mL/day) 5924-4294      Labs       Pertinent Labs    Results from last 7 days   Lab Units 08/19/24  0427 08/18/24  2251 08/18/24  0900 08/17/24  0510 08/16/24  1752 08/16/24  0329   SODIUM mmol/L 142  --  144 154*   < > 154*   POTASSIUM mmol/L 3.0* 3.3* 2.9* 3.5  3.5   < > 4.1   CHLORIDE mmol/L 113* " " --  114* 124*   < > 126*   CO2 mmol/L 17.0*  --  20.0* 20.0*   < > 17.0*   BUN mg/dL 8  --  11 20   < > 27*   CREATININE mg/dL 0.50*  --  0.63 0.75   < > 0.80   CALCIUM mg/dL 7.8*  --  8.2* 8.2*   < > 8.2*   BILIRUBIN mg/dL  --   --  0.6 0.8  --  0.6   ALK PHOS U/L  --   --  90 84  --  89   ALT (SGPT) U/L  --   --  38* 36*  --  38*   AST (SGOT) U/L  --   --  44* 34*  --  32   GLUCOSE mg/dL 145*  --  144* 89   < > 127*    < > = values in this interval not displayed.     Results from last 7 days   Lab Units 08/19/24  0427 08/18/24  1755 08/18/24  0925 08/18/24  0900 08/15/24  1519 08/15/24  1441   MAGNESIUM mg/dL  --   --   --  2.1  --  2.9*   PHOSPHORUS mg/dL 1.9*   < >  --  1.8*  --  3.4   HEMOGLOBIN g/dL  --   --  9.8*  --    < > 14.7   HEMOGLOBIN, POC   --   --   --   --    < >  --    HEMATOCRIT %  --   --  29.7*  --    < > 44.8   HEMATOCRIT POC   --   --   --   --    < >  --    WBC 10*3/mm3  --   --  15.07*  --    < > 21.24*   ALBUMIN g/dL  --   --   --  2.5*   < > 3.8    < > = values in this interval not displayed.     Results from last 7 days   Lab Units 08/18/24  0925 08/17/24  0510 08/16/24  0452 08/15/24  1441   INR   --   --   --  1.24*   APTT seconds  --   --   --  23.5   PLATELETS 10*3/mm3 138* 123* 143 183     No results found for: \"COVID19\"  No results found for: \"HGBA1C\"       Medications           Scheduled Medications enoxaparin, 40 mg, Subcutaneous, Nightly  senna-docusate sodium, 2 tablet, Oral, BID  sodium chloride, 10 mL, Intravenous, Q12H  sodium phosphate, 15 mmol, Intravenous, Q3H       Infusions lactated ringers 970 mL with potassium chloride 60 mEq infusion, , Last Rate: 50 mL/hr at 08/19/24 0852       PRN Medications   acetaminophen **OR** acetaminophen    aluminum-magnesium hydroxide-simethicone    senna-docusate sodium **AND** polyethylene glycol **AND** bisacodyl **AND** bisacodyl    Calcium Replacement - Follow Nurse / BPA Driven Protocol    Magnesium Standard Dose Replacement - Follow " Nurse / BPA Driven Protocol    nitroglycerin    ondansetron ODT **OR** ondansetron    Phosphorus Replacement - Follow Nurse / BPA Driven Protocol    Potassium Replacement - Follow Nurse / BPA Driven Protocol    [COMPLETED] Insert Peripheral IV **AND** sodium chloride    sodium chloride    sodium chloride     Physical Findings          General Findings alert, confused, disoriented, overweight   Oral/Mouth Cavity tooth or teeth missing   Edema  2+ (mild)   Gastrointestinal normoactive, last bowel movement: 8/18   Skin  pressure injury: bilateral coccyx stage 2    Tubes/Drains/Lines none   NFPE No clinical signs of muscle wasting or fat loss   --  Current Nutrition Orders & Evaluation of Intake       Oral Nutrition     Food Allergies NKFA   Current PO Diet Diet: Regular/House; Texture: Pureed (NDD 1); Fluid Consistency: Honey Thick   Supplement Magic Cup   PO Evaluation     % PO Intake , 25%    Factors Affecting Intake: altered mental status   --  PES STATEMENT / NUTRITION DIAGNOSIS      Nutrition Dx Problem  Problem: Predicted Suboptimal Intake  Etiology: Medical Diagnosis - AMS    Signs/Symptoms: Report/Observation, SLP/Swallow Evaluation, and Other (comment) altered textures      NUTRITION INTERVENTION / PLAN OF CARE      Intervention Goal(s) Meet estimated needs, Establish PO intake, Accepts oral nutrition supplement, No significant weight loss, and PO intake goal %: 75         RD Intervention/Action Supplement provided, Encourage intake, Continue to monitor, and Recommend/order: magic cup    --      Prescription/Orders:       PO Diet       Supplements Magic cup BID       Enteral Nutrition       Parenteral Nutrition    New Prescription Ordered? Yes   --      Monitor/Evaluation Per protocol, I&O, PO intake, Supplement intake, Pertinent labs, Swallow function   Discharge Plan/Needs Pending clinical course   --    RD to follow per protocol.      Electronically signed by:  Lin Hdez RD  08/19/24 14:44 EDT

## 2024-08-19 NOTE — PROGRESS NOTES
Dr. LE Arrieta    [facility] CORONARY CARE        Patient ID:  Name:  Mimi Pinto  MRN:  [id number]  1950  73 y.o.  female            CC/Reason for visit: Hyponatremia, altered mental status    Interval hx: Patient is still at times confused.  She does not answer my questions    ROS: Unobtainable, confused    Vitals:  Vitals:    08/19/24 0600 08/19/24 0601 08/19/24 0735 08/19/24 1153   BP:    105/90   BP Location:    Left arm   Patient Position:    Lying   Pulse: 90   90   Resp:   15 21   Temp:   98.2 °F (36.8 °C) 97.7 °F (36.5 °C)   TempSrc:   Axillary Axillary   SpO2: 95%   97%   Weight:  73.2 kg (161 lb 6 oz)     Height:               Body mass index is 30.49 kg/m².    Intake/Output Summary (Last 24 hours) at 8/19/2024 1156  Last data filed at 8/19/2024 0900  Gross per 24 hour   Intake 1075 ml   Output 2800 ml   Net -1725 ml       Exam:  GEN:  Elderly who appears chronically ill  Alert, opens eyes but is confused, mumbles, does not speak coherently  LUNGS: Clear breath sounds bilat, no use of accessory muscles  CV:  Normal S1S2, without murmur, some ankle edema  ABD:  Non tender, no enlarged liver or masses      Scheduled meds:  enoxaparin, 40 mg, Subcutaneous, Nightly  senna-docusate sodium, 2 tablet, Oral, BID  sodium chloride, 10 mL, Intravenous, Q12H  sodium phosphate, 15 mmol, Intravenous, Q3H      IV meds:                      lactated ringers 970 mL with potassium chloride 60 mEq infusion, , Last Rate: 50 mL/hr at 08/19/24 0852        Data Review:   I reviewed the patient's medications and new clinical results.          Lab Results   Component Value Date    CALCIUM 7.8 (L) 08/19/2024    PHOS 1.9 (C) 08/19/2024    MG 2.1 08/18/2024    MG 2.9 (H) 08/15/2024    MG 2.1 07/29/2024     Results from last 7 days   Lab Units 08/19/24  0427 08/18/24  2251 08/18/24  0925 08/18/24  0900 08/17/24  0510 08/16/24  1752 08/16/24  0452 08/16/24  0329 08/15/24  1519 08/15/24  1441   SODIUM mmol/L 142  --    --  144 154*   < >  --  154*   < > 164*   POTASSIUM mmol/L 3.0* 3.3*  --  2.9* 3.5  3.5   < >  --  4.1   < > 3.8   CHLORIDE mmol/L 113*  --   --  114* 124*   < >  --  126*   < > 126*   CO2 mmol/L 17.0*  --   --  20.0* 20.0*   < >  --  17.0*   < > 24.2   BUN mg/dL 8  --   --  11 20   < >  --  27*   < > 30*   CREATININE mg/dL 0.50*  --   --  0.63 0.75   < >  --  0.80   < > 1.28*   CALCIUM mg/dL 7.8*  --   --  8.2* 8.2*   < >  --  8.2*   < > 10.4   BILIRUBIN mg/dL  --   --   --  0.6 0.8  --   --  0.6  --  0.9   ALK PHOS U/L  --   --   --  90 84  --   --  89  --  115   ALT (SGPT) U/L  --   --   --  38* 36*  --   --  38*  --  52*   AST (SGOT) U/L  --   --   --  44* 34*  --   --  32  --  46*   GLUCOSE mg/dL 145*  --   --  144* 89   < >  --  127*   < > 170*   WBC 10*3/mm3  --   --  15.07*  --  18.96*  --  16.54*  --   --  21.24*   HEMOGLOBIN g/dL  --   --  9.8*  --  10.2*  --  11.2*  --   --  14.7   HEMOGLOBIN, POC   --   --   --   --   --   --   --   --    < >  --    PLATELETS 10*3/mm3  --   --  138*  --  123*  --  143  --   --  183   INR   --   --   --   --   --   --   --   --   --  1.24*   PROBNP pg/mL  --   --   --   --   --   --   --   --   --  631.0   PROCALCITONIN ng/mL  --   --   --  0.33*  --   --   --   --   --  0.40*    < > = values in this interval not displayed.     Results from last 7 days   Lab Units 08/15/24  1548 08/15/24  1537   BLOODCX  No growth at 3 days No growth at 3 days         Results from last 7 days   Lab Units 08/15/24  2121 08/15/24  1441   CK TOTAL U/L  --  157   HSTROP T ng/L 30* 30*            ASSESSMENT:   Severe Hypernatremia  Severe Hyperchloremia  Lactic acidosis  Sepsis  HLD  OA  Depression  Anxiety  UTI  Altered Mental Status  Recent prolonged hospitalization after being found down with rhabdomyolysis and uti  Granulomatous disease significant on chest imaging  Thrombocytopenia  Stool impaction      PLAN:  Patient remains encephalopathic.  Unfortunately not following commands, at  times speaks but with very mumbled speech, incoherent speech.  Nephrology is following and patient has had worsening metabolic acidosis.  Electrolytes are being replaced per protocol.  Patient has not been receiving almost any medications in the hospital due to ongoing encephalopathy and metabolic derangements but she still remains confused.  We will try CT scan of the head in the next day or 2 if the patient's mental status does not improve and if the patient cooperates.  I doubt that the patient will cooperate for MRI of the brain.  Procalcitonin marginally elevated but holding off on antibiotics at this time and monitoring temperature curve and white count.  Thrombocytopenia has improved.            Eugenio Arrieta MD  8/19/2024

## 2024-08-19 NOTE — CASE MANAGEMENT/SOCIAL WORK
Continued Stay Note  [facility]     Patient Name: Mimi Pinto  MRN: [id number]  Today's Date: 8/19/2024    Admit Date: 8/15/2024    Plan: PT & OT evals are pending, possible return to [facility]   Discharge Plan       Row Name 08/19/24 1419       Plan    Plan PT & OT evals are pending, possible return to [facility]    Plan Comments PT & OT evals are pending, ordered 8/19/24.      Row Name 08/19/24 1148       Plan    Provided Post Acute Provider List? Yes                 Expected Discharge Date and Time       Expected Discharge Date Expected Discharge Time    Aug 21, 2024               Brina Alonso RN

## 2024-08-20 NOTE — PLAN OF CARE
Goal Outcome Evaluation:  Plan of Care Reviewed With: patient           Outcome Evaluation: Pt is a 72 y/o F admitted from SNF with AMS and hypernatremia. Pt with recent prolonged hospitalization d/t sepsis/UTI and has been at rehab since. Unsure of current ADL/mobility status as pt is mostly non-verbal and has difficulty following commands for simple questioning. Presents with signficant weakness/deconditioning, dec act tolerance/endurance, AROM. MMT=LUE 0/5, RUE 1/5 with minimal to no initiation of any movement for purposeful activity. Depx2 to sit EOB and maxA for sitting balance. Pt would require total A for all ADLs, unable to reach hands to face for self-feeding. Unfortunately unable to meaningfully participate in functional ax and anticipate d/c LTC. OT will follow peripherally to continue monitoring appropriateness for acute care OT services.      Anticipated Discharge Disposition (OT): extended care facility

## 2024-08-20 NOTE — PROGRESS NOTES
[facility] Progress Note      Patient Name: Mimi Pinto  : 1950  MRN: [id number]  Primary Care Physician:  Yadira Watson MD  Date of admission: 8/15/2024    Subjective     Interval History:     Overnight no event  Patient lying in bed comfortable  More awake and interactive  Will make eye contact and move her head , unable to establish a conversation    Review of Systems:   As noted above    Objective     Vitals:   Temp:  [97.5 °F (36.4 °C)-98.5 °F (36.9 °C)] 98 °F (36.7 °C)  Heart Rate:  [82-98] 98  Resp:  [15-21] 16  BP: (105-136)/(78-95) 123/82    Intake/Output Summary (Last 24 hours) at 2024 0714  Last data filed at 2024 1719  Gross per 24 hour   Intake 1518 ml   Output 425 ml   Net 1093 ml       Physical Exam:    General Appearance: Chronically ill and deconditioned bilateral temporal wasting  Skin: warm and dry  HEENT: oral mucosa normal, nonicteric sclera.  Edentulous  Neck: supple, no JVD  Lungs: CTA  Heart: RRR, normal S1 and S2  Abdomen: soft, nontender, nondistended  : no palpable bladder  Extremities: no edema, cyanosis or clubbing  Neuro: Patient awake unable to establish a coherent conversation no obvious focalization,    Scheduled Meds:     enoxaparin, 40 mg, Subcutaneous, Nightly  potassium phosphate, 15 mmol, Intravenous, Once  senna-docusate sodium, 2 tablet, Oral, BID  sodium chloride, 10 mL, Intravenous, Q12H      IV Meds:   dextrose 5 % 980 mL with potassium chloride 40 mEq infusion, , Last Rate: 60 mL/hr at 24 2134        Results Reviewed:   I have personally reviewed the results from the time of this admission to 2024 07:14 EDT     Results from last 7 days   Lab Units 24  0523 24  1643 24  0427 24  2251 24  0900 24  0510 24  1752 24  0329   SODIUM mmol/L 145 146* 142  --  144 154*   < > 154*   POTASSIUM mmol/L 3.8  3.8 3.4* 3.0*   < > 2.9* 3.5  3.5   < > 4.1   CHLORIDE mmol/L  118* 115* 113*  --  114* 124*   < > 126*   CO2 mmol/L 19.0* 18.3* 17.0*  --  20.0* 20.0*   < > 17.0*   BUN mg/dL 7* 7* 8  --  11 20   < > 27*   CREATININE mg/dL 0.46* 0.48* 0.50*  --  0.63 0.75   < > 0.80   CALCIUM mg/dL 8.2* 8.1* 7.8*  --  8.2* 8.2*   < > 8.2*   BILIRUBIN mg/dL  --   --   --   --  0.6 0.8  --  0.6   ALK PHOS U/L  --   --   --   --  90 84  --  89   ALT (SGPT) U/L  --   --   --   --  38* 36*  --  38*   AST (SGOT) U/L  --   --   --   --  44* 34*  --  32   GLUCOSE mg/dL 118* 107* 145*  --  144* 89   < > 127*    < > = values in this interval not displayed.       Estimated Creatinine Clearance: 99 mL/min (A) (by C-G formula based on SCr of 0.46 mg/dL (L)).    Results from last 7 days   Lab Units 08/20/24  0523 08/19/24  1643 08/19/24  0427 08/18/24  1755 08/18/24  0900 08/15/24  1441   MAGNESIUM mg/dL  --   --   --   --  2.1 2.9*   PHOSPHORUS mg/dL 2.1* 4.7* 1.9*   < > 1.8* 3.4    < > = values in this interval not displayed.             Results from last 7 days   Lab Units 08/18/24  0925 08/17/24  0510 08/16/24  0452 08/15/24  1519 08/15/24  1441   WBC 10*3/mm3 15.07* 18.96* 16.54*  --  21.24*   HEMOGLOBIN g/dL 9.8* 10.2* 11.2*  --  14.7   HEMOGLOBIN, POC g/dL  --   --   --  14.8  --    PLATELETS 10*3/mm3 138* 123* 143  --  183       Results from last 7 days   Lab Units 08/15/24  1441   INR  1.24*       Assessment / Plan     ASSESSMENT:    - Hypernatremia secondary to encephalopathy currently on dextrose infusion.  Attempt to discontinue therapy caused worsening hyponatremia.  Following sodium trend closely  -Sepsis with lactic acidosis.  Patient completed course of cefepime   -Encephalopathy/vascular dementia per primary team avoiding sedatives  -Hypokalemia with hypophosphatemia continues to require IV replacement    PLAN:  Patient continues to require replacement of fluids, potassium and phosphate.  His oral intake is minimal.   Every attempt to discontinue IV replacements causes worsening electrolyte  disturbances unclear if the patient might need, long-term feeding alternatives.  Continue surveillance labs    Thank you for involving us in the care of Mimi Pinto.  Please feel free to call with any questions.    Marc Ortiz MD  08/20/24  07:14 EDT    [facility]  [phone]    Please note that portions of this note were completed with a voice recognition program.

## 2024-08-20 NOTE — THERAPY EVALUATION
"Patient Name: Mimi Pinto  : 1950    MRN: [id number]                              Today's Date: 2024       Admit Date: 8/15/2024    Visit Dx:     ICD-10-CM ICD-9-CM   1. Septic shock  A41.9 038.9    R65.21 785.52     995.92   2. Hypernatremia  E87.0 276.0     Patient Active Problem List   Diagnosis    Hypernatremia     History reviewed. No pertinent past medical history.  History reviewed. No pertinent surgical history.   General Information       Row Name 24 [facility]          Physical Therapy Time and Intention    Document Type evaluation  -     Mode of Treatment co-treatment;physical therapy  -       Row Name 24 [facility]          General Information    Patient Profile Reviewed yes  -     Prior Level of Function --  Per chart, pt was independent prior to last hospital admission. PT at hospital had used a marisela lift on eval, unsure of mobility status at SNF but noted plans for transition to LTC  -     Existing Precautions/Restrictions fall  -     Barriers to Rehab medically complex;previous functional deficit;cognitive status  -       Row Name 24 1153          Living Environment    People in Home facility resident  Admitted from SNF  -       Row Name 24 [facility]          Cognition    Orientation Status (Cognition) oriented to;person;place;other (see comments)  Very garbled speech, may have said \"Mimi\" and \"hospital\" but difficult to understand  -       Row Name 24 1153          Safety Issues, Functional Mobility    Impairments Affecting Function (Mobility) balance;cognition;coordination;endurance/activity tolerance;strength;grasp;motor control;postural/trunk control;range of motion (ROM);sensation/sensory awareness;visual/perceptual  -     Comment, Safety Issues/Impairments (Mobility) Co treatment medically appropriate and necessary due to patient acuity level, activity tolerance and safety of patient and staff. Evaluation established to achieve all goals in POC.  -  "              User Key  (r) = Recorded By, (t) = Taken By, (c) = Cosigned By      Initials Name Provider Type     Elida Floyd PT Physical Therapist                   Mobility       Row Name 08/20/24 1155          Bed Mobility    Bed Mobility supine-sit;sit-supine  -     Supine-Sit Sanders (Bed Mobility) dependent (less than 25% patient effort);2 person assist;verbal cues  -     Sit-Supine Sanders (Bed Mobility) dependent (less than 25% patient effort);2 person assist;verbal cues  -     Assistive Device (Bed Mobility) head of bed elevated;draw sheet  -[facility] Name 08/20/24 1156          Transfers    Comment, (Transfers) Max A x1 for sitting balance, unable to follow any commands, unsafe to attempt transfers  -               User Key  (r) = Recorded By, (t) = Taken By, (c) = Cosigned By      Initials Name Provider Type     Elida Floyd PT Physical Therapist                   Obj/Interventions       Row Name 08/20/24 1155          Range of Motion Comprehensive    General Range of Motion bilateral lower extremity ROM WFL  -BH       Row Name 08/20/24 1150          Strength Comprehensive (MMT)    General Manual Muscle Testing (MMT) Assessment lower extremity strength deficits identified  -     Comment, General Manual Muscle Testing (MMT) Assessment Generalized weakness, 0/5 BLE, unable to initiate any movement today  -BH       Row Name 08/20/24 115          Balance    Balance Assessment sitting static balance;sitting dynamic balance  -     Static Sitting Balance maximum assist;1-person assist;verbal cues;non-verbal cues (demo/gesture)  -     Dynamic Sitting Balance maximum assist;1-person assist;verbal cues;non-verbal cues (demo/gesture)  -     Position, Sitting Balance supported;sitting edge of bed  -     Balance Interventions sitting;supported;static;dynamic  -[facility] Name 08/20/24 1156          Sensory Assessment (Somatosensory)    Sensory Assessment (Somatosensory)  unable/difficult to assess  -               User Key  (r) = Recorded By, (t) = Taken By, (c) = Cosigned By      Initials Name Provider Type     Elida Floyd PT Physical Therapist                   Goals/Plan       Row Name 08/20/24 1200          Bed Mobility Goal 1 (PT)    Activity/Assistive Device (Bed Mobility Goal 1, PT) bed mobility activities, all  -     K[place] Level/Cues Needed (Bed Mobility Goal 1, PT) maximum assist (25-49% patient effort)  -     Time Frame (Bed Mobility Goal 1, PT) 2 weeks  -BH       Row Name 08/20/24 1200          Therapy Assessment/Plan (PT)    Planned Therapy Interventions (PT) balance training;bed mobility training;home exercise program;patient/family education;strengthening;stretching;ROM (range of motion);transfer training  -               User Key  (r) = Recorded By, (t) = Taken By, (c) = Cosigned By      Initials Name Provider Type     Elida Floyd PT Physical Therapist                   Clinical Impression       Row Name 08/20/24 1156          Pain    Additional Documentation Pain Scale: FACES Pre/Post-Treatment (Group)  -BH       Row Name 08/20/24 1156          Pain Scale: FACES Pre/Post-Treatment    Pain: FACES Scale, Pretreatment 2-->hurts little bit  -     Posttreatment Pain Rating 6-->hurts even more  -     Pre/Posttreatment Pain Comment Pt moaning throughout session, appears to be in pain but unable to localize/verbalize  -[facility] Name 08/20/24 1154          Plan of Care Review    Plan of Care Reviewed With patient  -     Outcome Evaluation Pt is a 74 yo F admitted from SNF with AMS and hypernatremia. Pt largely nonverbal throughout session with very garbled speech when she did attempt, moaning primarily. Per chart, pt was independent prior to last admission at another facility. PT there had used a marisela lift on eval to transfer pt to chair, unsure of mobility status at rehab, but likely continued marisela lift use d/t significant  deconditioning noted. Pt presents to PT with impaired strength, endurance, and balance limiting overall mobility. Pt transferred to EOB with dep A x2 and was unable to follow any commands to actively participate in any therapy today. 0/5 BLE strength and poor sitting balance requiring max A x1 throughout. Pt assisted back to supine and repositioned, noted no muscle tone at all in upper or lower extremities. Pt does not appear to be appropriate for continue rehab at this time given cognitive status and significant deconditioning. PT recommending DC to LTC, will follow peripherally but likely s/o next time if no improvement - d/w CCP.  -       Row Name 08/20/24 1156          Therapy Assessment/Plan (PT)    Rehab Potential (PT) fair, will monitor progress closely  -     Therapy Frequency (PT) 2 times/wk  -       Row Name 08/20/24 1156          Vital Signs    O2 Delivery Pre Treatment room air  -     O2 Delivery Intra Treatment room air  -     O2 Delivery Post Treatment room air  -       Row Name 08/20/24 1156          Positioning and Restraints    Pre-Treatment Position in bed  -     Post Treatment Position bed  -     In Bed notified nsg;fowlers;call light within reach;encouraged to call for assist;exit alarm on  -               User Key  (r) = Recorded By, (t) = Taken By, (c) = Cosigned By      Initials Name Provider Type     Elida Floyd, PT Physical Therapist                   Outcome Measures       Row Name 08/20/24 1201          How much help from another person do you currently need...    Turning from your back to your side while in flat bed without using bedrails? 1  -BH     Moving from lying on back to sitting on the side of a flat bed without bedrails? 1  -BH     Moving to and from a bed to a chair (including a wheelchair)? 1  -BH     Standing up from a chair using your arms (e.g., wheelchair, bedside chair)? 1  -BH     Climbing 3-5 steps with a railing? 1  -BH     To walk in hospital  room? 1  -     AM-PAC 6 Clicks Score (PT) 6  -     Highest Level of Mobility Goal 2 --> Bed activities/dependent transfer  -       Row Name 08/20/24 1201          Functional Assessment    Outcome Measure Options AM-PAC 6 Clicks Basic Mobility (PT)  -               User Key  (r) = Recorded By, (t) = Taken By, (c) = Cosigned By      Initials Name Provider Type     Elida Floyd PT Physical Therapist                                 Physical Therapy Education       Title: PT OT SLP Therapies (In Progress)       Topic: Physical Therapy (In Progress)       Point: Mobility training (In Progress)       Learning Progress Summary             Patient Acceptance, E,TB,D, NL,NR by  at 8/20/2024 1201                         Point: Home exercise program (Not Started)       Learner Progress:  Not documented in this visit.              Point: Body mechanics (Not Started)       Learner Progress:  Not documented in this visit.              Point: Precautions (Not Started)       Learner Progress:  Not documented in this visit.                              User Key       Initials Effective Dates Name Provider Type Discipline     04/08/22 -  Elida Floyd PT Physical Therapist PT                  PT Recommendation and Plan  Planned Therapy Interventions (PT): balance training, bed mobility training, home exercise program, patient/family education, strengthening, stretching, ROM (range of motion), transfer training  Plan of Care Reviewed With: patient  Outcome Evaluation: Pt is a 74 yo F admitted from SNF with AMS and hypernatremia. Pt largely nonverbal throughout session with very garbled speech when she did attempt, moaning primarily. Per chart, pt was independent prior to last admission at another facility. PT there had used a marisela lift on eval to transfer pt to chair, unsure of mobility status at rehab, but likely continued marisela lift use d/t significant deconditioning noted. Pt presents to PT with impaired  strength, endurance, and balance limiting overall mobility. Pt transferred to EOB with dep A x2 and was unable to follow any commands to actively participate in any therapy today. 0/5 BLE strength and poor sitting balance requiring max A x1 throughout. Pt assisted back to supine and repositioned, noted no muscle tone at all in upper or lower extremities. Pt does not appear to be appropriate for continue rehab at this time given cognitive status and significant deconditioning. PT recommending DC to LTC, will follow peripherally but likely s/o next time if no improvement - d/w CCP.     Time Calculation:   PT Evaluation Complexity  History, PT Evaluation Complexity: 1-2 personal factors and/or comorbidities  Examination of Body Systems (PT Eval Complexity): total of 3 or more elements  Clinical Presentation (PT Evaluation Complexity): evolving  Clinical Decision Making (PT Evaluation Complexity): moderate complexity  Overall Complexity (PT Evaluation Complexity): moderate complexity     PT Charges       Row Name 08/20/24 1201             Time Calculation    Start Time 1012  -      Stop Time 1026  -      Time Calculation (min) 14 min  -      PT Received On 08/20/24  -      PT - Next Appointment 08/23/24  -      PT Goal Re-Cert Due Date 09/03/24  -         Time Calculation- PT    Total Timed Code Minutes- PT 10 minute(s)  -         Timed Charges    86762 - PT Therapeutic Activity Minutes 10  -BH         Total Minutes    Timed Charges Total Minutes 10  -BH       Total Minutes 10  -BH                User Key  (r) = Recorded By, (t) = Taken By, (c) = Cosigned By      Initials Name Provider Type     Elida Floyd, PT Physical Therapist                  Therapy Charges for Today       Code Description Service Date Service Provider Modifiers Qty    15659161075  PT THERAPEUTIC ACT EA 15 MIN 8/20/2024 Elida Floyd, PT GP 1    [id number]  PT EVAL MOD COMPLEXITY 3 8/20/2024 Elida Floyd, PT GP 1             PT G-Codes  Outcome Measure Options: AM-PAC 6 Clicks Basic Mobility (PT)  AM-PAC 6 Clicks Score (PT): 6  PT Discharge Summary  Anticipated Discharge Disposition (PT): extended care facility    Elida Floyd, PT  8/20/2024

## 2024-08-20 NOTE — PROGRESS NOTES
Dr. LE Arrieta    [facility] CORONARY CARE        Patient ID:  Name:  Mimi Pinto  MRN:  [id number]  1950  73 y.o.  female            CC/Reason for visit: Hyponatremia, altered mental status, metabolic acidosis    Interval hx: Patient continues to be confused.  At baseline I think she has intellectual disability, low IQ, but I cannot confirm.  She mumbles, I cannot comprehend her speech.  She is more awake and alert but I literally cannot understand any of her words.  She is at times restless    ROS: Cannot give, confused    Vitals:  Vitals:    08/20/24 0319 08/20/24 0500 08/20/24 0800 08/20/24 1108   BP: 123/82  119/71 114/72   BP Location: Left arm  Left arm Left arm   Patient Position: Lying  Lying Lying   Pulse:   95 94   Resp: 16  20 18   Temp: 98 °F (36.7 °C)  98.2 °F (36.8 °C) 98 °F (36.7 °C)   TempSrc: Oral  Oral Axillary   SpO2:   97% 94%   Weight:  70 kg (154 lb 5.2 oz)     Height:               Body mass index is 29.16 kg/m².    Intake/Output Summary (Last 24 hours) at 8/20/2024 1151  Last data filed at 8/19/2024 1719  Gross per 24 hour   Intake 893 ml   Output 425 ml   Net 468 ml       Exam:  GEN:  No distress  Alert, patient has garbled speech.  I cannot understand her words.  She only occasionally follows commands.  I think she has intellectual disability  LUNGS: Clear breath sounds bilat, no use of accessory muscles  CV:  Normal S1S2, without murmur, no edema  ABD:  Non tender, no enlarged liver or masses      Scheduled meds:  enoxaparin, 40 mg, Subcutaneous, Nightly  potassium phosphate, 15 mmol, Intravenous, Once  senna-docusate sodium, 2 tablet, Oral, BID  sodium chloride, 10 mL, Intravenous, Q12H      IV meds:                      dextrose 5 % 980 mL with potassium chloride 40 mEq infusion, , Last Rate: 60 mL/hr at 08/19/24 5728        Data Review:   I reviewed the patient's medications and new clinical results.           Results from last 7 days   Lab Units 08/20/24  1813  08/19/24  1643 08/19/24  0427 08/18/24  2251 08/18/24  0925 08/18/24  0900 08/17/24  0510 08/16/24  1752 08/16/24  0452 08/16/24  0329 08/15/24  1519 08/15/24  1441   SODIUM mmol/L 145 146* 142  --   --  144 154*   < >  --  154*   < > 164*   POTASSIUM mmol/L 3.8  3.8 3.4* 3.0*   < >  --  2.9* 3.5  3.5   < >  --  4.1   < > 3.8   CHLORIDE mmol/L 118* 115* 113*  --   --  114* 124*   < >  --  126*   < > 126*   CO2 mmol/L 19.0* 18.3* 17.0*  --   --  20.0* 20.0*   < >  --  17.0*   < > 24.2   BUN mg/dL 7* 7* 8  --   --  11 20   < >  --  27*   < > 30*   CREATININE mg/dL 0.46* 0.48* 0.50*  --   --  0.63 0.75   < >  --  0.80   < > 1.28*   CALCIUM mg/dL 8.2* 8.1* 7.8*  --   --  8.2* 8.2*   < >  --  8.2*   < > 10.4   BILIRUBIN mg/dL  --   --   --   --   --  0.6 0.8  --   --  0.6  --  0.9   ALK PHOS U/L  --   --   --   --   --  90 84  --   --  89  --  115   ALT (SGPT) U/L  --   --   --   --   --  38* 36*  --   --  38*  --  52*   AST (SGOT) U/L  --   --   --   --   --  44* 34*  --   --  32  --  46*   GLUCOSE mg/dL 118* 107* 145*  --   --  144* 89   < >  --  127*   < > 170*   WBC 10*3/mm3  --   --   --   --  15.07*  --  18.96*  --  16.54*  --   --  21.24*   HEMOGLOBIN g/dL  --   --   --   --  9.8*  --  10.2*  --  11.2*  --   --  14.7   HEMOGLOBIN, POC   --   --   --   --   --   --   --   --   --   --    < >  --    PLATELETS 10*3/mm3  --   --   --   --  138*  --  123*  --  143  --   --  183   INR   --   --   --   --   --   --   --   --   --   --   --  1.24*   PROBNP pg/mL  --   --   --   --   --   --   --   --   --   --   --  631.0   PROCALCITONIN ng/mL  --   --   --   --   --  0.33*  --   --   --   --   --  0.40*    < > = values in this interval not displayed.     Results from last 7 days   Lab Units 08/15/24  1548 08/15/24  1537   BLOODCX  No growth at 4 days No growth at 4 days            ASSESSMENT:   Encephalopathy   severe Hypernatremia  Severe Hyperchloremia  Lactic  acidosis  Sepsis  HLD  OA  Depression  Anxiety  UTI  Altered Mental Status  Recent prolonged hospitalization after being found down with rhabdomyolysis and uti  Granulomatous disease significant on chest imaging  Thrombocytopenia  Stool impaction      PLAN:  I am not sure if this is still ongoing encephalopathy or she has cognitive impairment of some other etiology, CNS lesion.  I will consult neurology and consider MRI of the brain.  From the metabolic standpoint, we have been trying to optimize her for the past 3 days but her electrolytes continue to be abnormal, therefore she may have renal tubular acidosis.  We will start workup for this.  I will send blood for screening of any autoimmune conditions including vasculitis antibodies and serum protein electrophoresis.  We will check parathyroid hormone.  TSH was normal recently.            Eugeino Arrieta MD  8/20/2024

## 2024-08-20 NOTE — PLAN OF CARE
Goal Outcome Evaluation:              Outcome Evaluation: Discussed with RN. Pt having EEG completed. Pt with decline in status and is now lethargic, not eating. Several tests are in progress. Discussed need for Cortrak for nutrition and meds. MD to reassess tomorrow and order Cortrak if needed. ST to follow.      Anticipated Discharge Disposition (SLP): unknown

## 2024-08-20 NOTE — PLAN OF CARE
Goal Outcome Evaluation:  Plan of Care Reviewed With: patient           Outcome Evaluation: Pt is a 74 yo F admitted from SNF with AMS and hypernatremia. Pt largely nonverbal throughout session with very garbled speech when she did attempt, moaning primarily. Per chart, pt was independent prior to last admission at another facility. PT there had used a marisela lift on eval to transfer pt to chair, unsure of mobility status at rehab, but likely continued marisela lift use d/t significant deconditioning noted. Pt presents to PT with impaired strength, endurance, and balance limiting overall mobility. Pt transferred to EOB with dep A x2 and was unable to follow any commands to actively participate in any therapy today. 0/5 BLE strength and poor sitting balance requiring max A x1 throughout. Pt assisted back to supine and repositioned, noted no muscle tone at all in upper or lower extremities. Pt does not appear to be appropriate for continue rehab at this time given cognitive status and significant deconditioning. PT recommending DC to LTC, will follow peripherally but likely s/o next time if no improvement - d/w CCP.      Anticipated Discharge Disposition (PT): extended care facility

## 2024-08-20 NOTE — THERAPY EVALUATION
"Patient Name: Mimi Pinto  : 1950    MRN: [id number]                              Today's Date: 2024       Admit Date: 8/15/2024    Visit Dx:     ICD-10-CM ICD-9-CM   1. Septic shock  A41.9 038.9    R65.21 785.52     995.92   2. Hypernatremia  E87.0 276.0     Patient Active Problem List   Diagnosis    Hypernatremia     History reviewed. No pertinent past medical history.  History reviewed. No pertinent surgical history.   General Information       Row Name 24 1238          OT Time and Intention    Document Type evaluation  -     Mode of Treatment occupational therapy;co-treatment  -       Row Name 24 1238          General Information    Patient Profile Reviewed yes  -     Prior Level of Function --  Pt unable to provide info on PLOF. Per chart, pt was independent prior to last hospital admission. Now requiring significant assist at rehab. Unsure of mobility status at SNF but noted plans for transition to LTC  -     Existing Precautions/Restrictions fall  -     Barriers to Rehab medically complex;previous functional deficit;cognitive status  -       Row Name 24 1238          Living Environment    People in Home facility resident  from SNF  -       Row Name 24 1238          Cognition    Orientation Status (Cognition) oriented to;person;place;other (see comments)  dysarthric speech with very few verbals t/o session. Appears to attempt to state name and mumble \"hospital\". Mostly nods/shakes head for yes/no but inconsistently  -       Row Name 24 1238          Safety Issues, Functional Mobility    Safety Issues Affecting Function (Mobility) ability to follow commands  -     Impairments Affecting Function (Mobility) balance;cognition;coordination;endurance/activity tolerance;strength;grasp;motor control;postural/trunk control;range of motion (ROM);visual/perceptual;muscle tone abnormal;pain  -     Comment, Safety Issues/Impairments (Mobility) Pt is co-tx " appropriate d/t decreased activity tolerance and to maximize pt participation and safety with functional activity.  -               User Key  (r) = Recorded By, (t) = Taken By, (c) = Cosigned By      Initials Name Provider Type    Tana Best OT Occupational Therapist                     Mobility/ADL's       Row Name 08/20/24 1242          Bed Mobility    Bed Mobility supine-sit;sit-supine;scooting/bridging  -     Scooting/Bridging [place] (Bed Mobility) dependent (less than 25% patient effort);2 person assist  -     Supine-Sit [place] (Bed Mobility) dependent (less than 25% patient effort);2 person assist;verbal cues  -     Sit-Supine [place] (Bed Mobility) dependent (less than 25% patient effort);2 person assist;verbal cues  -     Bed Mobility, Safety Issues cognitive deficits limit understanding;decreased use of arms for pushing/pulling;decreased use of legs for bridging/pushing;impaired trunk control for bed mobility  -     Assistive Device (Bed Mobility) head of bed elevated;draw sheet  -       Row Name 08/20/24 1242          Transfers    Comment, (Transfers) not safe to attempt s/t poor sitting balance, significant weakness and dec command following  -       Row Name 08/20/24 1242          Activities of Daily Living    BADL Assessment/Intervention lower body dressing  dep for all ADLs. Unable to reach hands to face for grooming or self-feeding  -[facility] Name 08/20/24 1242          Lower Body Dressing Assessment/Training    [place] Level (Lower Body Dressing) dependent (less than 25% patient effort)  -               User Key  (r) = Recorded By, (t) = Taken By, (c) = Cosigned By      Initials Name Provider Type    Tana Best OT Occupational Therapist                   Obj/Interventions       Row Name 08/20/24 1244          Vision Assessment/Intervention    Vision Assessment Comment appears to have L sided inattention  -       Row Name 08/20/24 1244           Range of Motion Comprehensive    Comment, General Range of Motion minimal AROM in BUE/BLEs. PROM LUE to 90 deg shoulder flexion, RUE WFL  -[facility] Name 08/20/24 1244          Strength Comprehensive (MMT)    General Manual Muscle Testing (MMT) Assessment upper extremity strength deficits identified  -     Comment, General Manual Muscle Testing (MMT) Assessment LUE 0/5, RUE 1/5, minimal initiation of any movement  -       Row Name 08/20/24 1244          Motor Skills    Motor Skills muscle tone  -     Muscle Tone hypotonia;upper extremity(s)  low tone in all extremities. UE posturing/reflexes in extension and internal rotation  -       Row Name 08/20/24 1244          Balance    Static Sitting Balance maximum assist;1-person assist;non-verbal cues (demo/gesture);verbal cues  -     Position, Sitting Balance supported;sitting edge of bed  -     Balance Interventions sitting;supported;static  -     Comment, Balance poor trunk control  -               User Key  (r) = Recorded By, (t) = Taken By, (c) = Cosigned By      Initials Name Provider Type    JW Tana Fernandez OT Occupational Therapist                   Goals/Plan       Row Name 08/20/24 1256          Bed Mobility Goal 1 (OT)    Activity/Assistive Device (Bed Mobility Goal 1, OT) bed mobility activities, all  -     [place] Level/Cues Needed (Bed Mobility Goal 1, OT) maximum assist (25-49% patient effort)  -     Time Frame (Bed Mobility Goal 1, OT) short term goal (STG);2 weeks  -     Progress/Outcomes (Bed Mobility Goal 1, OT) goal ongoing  -[facility] Name 08/20/24 1256          Grooming Goal 1 (OT)    Activity/Device (Grooming Goal 1, OT) grooming skills, all  -     [place] (Grooming Goal 1, OT) moderate assist (50-74% patient effort)  -     Time Frame (Grooming Goal 1, OT) short term goal (STG);2 weeks  -     Progress/Outcome (Grooming Goal 1, OT) goal ongoing  -JW       Row Name 08/20/24 1256          Self-Feeding Goal 1  (OT)    Activity/Device (Self-Feeding Goal 1, OT) self-feeding skills, all  -     [place] Level/Cues Needed (Self-Feeding Goal 1, OT) moderate assist (50-74% patient effort)  -     Time Frame (Self-Feeding Goal 1, OT) short term goal (STG);2 weeks  -     Progress/Outcomes (Self-Feeding Goal 1, OT) goal ongoing  -       Row Name 08/20/24 1256          Therapy Assessment/Plan (OT)    Planned Therapy Interventions (OT) activity tolerance training;adaptive equipment training;BADL retraining;cognitive/visual perception retraining;functional balance retraining;occupation/activity based interventions;ROM/therapeutic exercise;patient/caregiver education/training  -               User Key  (r) = Recorded By, (t) = Taken By, (c) = Cosigned By      Initials Name Provider Type    Tana Best, SALONI Occupational Therapist                   Clinical Impression       Row Name 08/20/24 1250          Pain Scale: FACES Pre/Post-Treatment    Pain: FACES Scale, Pretreatment 2-->hurts little bit  -     Posttreatment Pain Rating 6-->hurts even more  -     Pre/Posttreatment Pain Comment groaning t/o session, unable to indicate location of pain  -       Row Name 08/20/24 1250          Plan of Care Review    Plan of Care Reviewed With patient  -     Outcome Evaluation Pt is a 74 y/o F admitted from SNF with AMS and hypernatremia. Pt with recent prolonged hospitalization d/t sepsis/UTI and has been at rehab since. Unsure of current ADL/mobility status as pt is mostly non-verbal and has difficulty following commands for simple questioning. Presents with signficant weakness/deconditioning, dec act tolerance/endurance, AROM. MMT=LUE 0/5, RUE 1/5 with minimal to no initiation of any movement for purposeful activity. Depx2 to sit EOB and maxA for sitting balance. Pt would require total A for all ADLs, unable to reach hands to face for self-feeding. Unfortunately unable to meaningfully participate in functional ax and  anticipate d/c LTC. OT will follow peripherally to continue monitoring appropriateness for acute care OT services.  -       Row Name 08/20/24 1250          Therapy Assessment/Plan (OT)    Rehab Potential (OT) fair, will monitor progress closely  -     Criteria for Skilled Therapeutic Interventions Met (OT) yes;skilled treatment is necessary  -     Therapy Frequency (OT) 2 times/wk  -       Row Name 08/20/24 1250          Therapy Plan Review/Discharge Plan (OT)    Anticipated Discharge Disposition (OT) extended care facility  -       Row Name 08/20/24 1250          Positioning and Restraints    Pre-Treatment Position in bed  -JW     Post Treatment Position bed  -JW     In Bed notified nsg;fowlers;call light within reach;encouraged to call for assist;exit alarm on  -               User Key  (r) = Recorded By, (t) = Taken By, (c) = Cosigned By      Initials Name Provider Type    Tana Best OT Occupational Therapist                   Outcome Measures       Row Name 08/20/24 1257          How much help from another is currently needed...    Putting on and taking off regular lower body clothing? 1  -JW     Bathing (including washing, rinsing, and drying) 1  -JW     Toileting (which includes using toilet bed pan or urinal) 1  -JW     Putting on and taking off regular upper body clothing 1  -JW     Taking care of personal grooming (such as brushing teeth) 1  -JW     Eating meals 1  -JW     AM-PAC 6 Clicks Score (OT) 6  -       Row Name 08/20/24 1201          How much help from another person do you currently need...    Turning from your back to your side while in flat bed without using bedrails? 1  -BH     Moving from lying on back to sitting on the side of a flat bed without bedrails? 1  -BH     Moving to and from a bed to a chair (including a wheelchair)? 1  -BH     Standing up from a chair using your arms (e.g., wheelchair, bedside chair)? 1  -BH     Climbing 3-5 steps with a railing? 1  -BH     To  walk in hospital room? 1  -     AM-[facility] 6 Clicks Score (PT) 6  -     Highest Level of Mobility Goal 2 --> Bed activities/dependent transfer  -       Row Name 08/20/24 1257 08/20/24 1201       Functional Assessment    Outcome Measure Options AM-PAC 6 Clicks Daily Activity (OT)  -[facility]-PAC 6 Clicks Basic Mobility (PT)  -              User Key  (r) = Recorded By, (t) = Taken By, (c) = Cosigned By      Initials Name Provider Type     Elida Floyd, PT Physical Therapist    Tana Best OT Occupational Therapist                    Occupational Therapy Education       Title: PT OT SLP Therapies (In Progress)       Topic: Occupational Therapy (In Progress)       Point: ADL training (In Progress)       Description:   Instruct learner(s) on proper safety adaptation and remediation techniques during self care or transfers.   Instruct in proper use of assistive devices.                  Learning Progress Summary             Patient Acceptance, E, NL by LON at 8/20/2024 1257                         Point: Home exercise program (Not Started)       Description:   Instruct learner(s) on appropriate technique for monitoring, assisting and/or progressing therapeutic exercises/activities.                  Learner Progress:  Not documented in this visit.              Point: Precautions (In Progress)       Description:   Instruct learner(s) on prescribed precautions during self-care and functional transfers.                  Learning Progress Summary             Patient Acceptance, E, NL by LON at 8/20/2024 1257                         Point: Body mechanics (In Progress)       Description:   Instruct learner(s) on proper positioning and spine alignment during self-care, functional mobility activities and/or exercises.                  Learning Progress Summary             Patient Acceptance, E, NL by LON at 8/20/2024 1257                                         User Key       Initials Effective Dates Name Provider Type  Discipline     06/10/21 -  Tana Fernandez OT Occupational Therapist OT                  OT Recommendation and Plan  Planned Therapy Interventions (OT): activity tolerance training, adaptive equipment training, BADL retraining, cognitive/visual perception retraining, functional balance retraining, occupation/activity based interventions, ROM/therapeutic exercise, patient/caregiver education/training  Therapy Frequency (OT): 2 times/wk  Plan of Care Review  Plan of Care Reviewed With: patient  Outcome Evaluation: Pt is a 72 y/o F admitted from SNF with AMS and hypernatremia. Pt with recent prolonged hospitalization d/t sepsis/UTI and has been at rehab since. Unsure of current ADL/mobility status as pt is mostly non-verbal and has difficulty following commands for simple questioning. Presents with signficant weakness/deconditioning, dec act tolerance/endurance, AROM. MMT=LUE 0/5, RUE 1/5 with minimal to no initiation of any movement for purposeful activity. Depx2 to sit EOB and maxA for sitting balance. Pt would require total A for all ADLs, unable to reach hands to face for self-feeding. Unfortunately unable to meaningfully participate in functional ax and anticipate d/c LTC. OT will follow peripherally to continue monitoring appropriateness for acute care OT services.     Time Calculation:   Evaluation Complexity (OT)  Review Occupational Profile/Medical/Therapy History Complexity: expanded/moderate complexity  Assessment, Occupational Performance/Identification of Deficit Complexity: 3-5 performance deficits  Clinical Decision Making Complexity (OT): detailed assessment/moderate complexity  Overall Complexity of Evaluation (OT): moderate complexity     Time Calculation- OT       Row Name 08/20/24 1300             Time Calculation-     OT Start Time 1013  -      OT Stop Time 1027  -      OT Time Calculation (min) 14 min  -      Total Timed Code Minutes- OT 8 minute(s)  -      OT Received On 08/20/24  -       OT - Next Appointment 08/23/24  -JW      OT Goal Re-Cert Due Date 09/03/24  -JW         Timed Charges    50698 - OT Therapeutic Activity Minutes 8  -JW         Untimed Charges    OT Eval/Re-eval Minutes 6  -JW         Total Minutes    Timed Charges Total Minutes 8  -JW      Untimed Charges Total Minutes 6  -JW       Total Minutes 14  -JW                User Key  (r) = Recorded By, (t) = Taken By, (c) = Cosigned By      Initials Name Provider Type    Tana Best OT Occupational Therapist                  Therapy Charges for Today       Code Description Service Date Service Provider Modifiers Qty    69250819928  OT THERAPEUTIC ACT EA 15 MIN 8/20/2024 Tana Fernandez OT GO 1    04698076521 HC OT EVAL MOD COMPLEXITY 3 8/20/2024 Tana Fernandez OT GO 1                 Tana Fernandez OT  8/20/2024

## 2024-08-20 NOTE — CONSULTS
"Neurology Consult Note    Consult Date: 8/20/2024    Referring MD: Carla Sun MD    Reason for Consult I have been asked to see the patient in neurological consultation to render advice and opinion regarding Altered mental status     Mimi Pinto is a 73 y.o. female with prior documented history of hyperlipidemia, osteoarthritis, depression/anxiety    She was admitted on 8/15/2024 where she was brought in from a facility obtunded, was found to have a sodium of 164 and was admitted for severe hyponatremia.    On review of chart she was recently at [facility] where she was brought in because she was found down she was found to have bacterial conjunctivitis, UTI and sepsis and treated with broad-spectrum antibiotics and later discharged to SNF rehab facility from 7/26/2024 to 7/30/2024.  Per the initial H&P in the previous admission it was documented as follows \"She was noted to be down by a neighbor yesterday but did not attempt to help her. The  found her crawling around in the apartment floor and EMS called. It was noted that her apartment was in squalor with feces all over the floor, she admits to having 3 cats. She does not eat or bath regularly and was found very disheveled on arrival. It is noted that the daughter has not spoken to her in a month but Ms. Pinto states that she has a granddaughter that checks on her frequently through the week \" as per the discharge summary it was documented she was alert oriented before discharge.    Today morning patient is awake but not alert not following commands moves her eyes around she is flaccid on the left upper lower withdraws on the right upper right lower      History reviewed. No pertinent past medical history.    Exam  /72 (BP Location: Left arm, Patient Position: Lying)   Pulse 94   Temp 98 °F (36.7 °C) (Axillary)   Resp 18   Ht 154.9 cm (61\")   Wt 70 kg (154 lb 5.2 oz)   SpO2 94%   BMI 29.16 kg/m²   Gen: NAD, " vitals reviewed  Patient is lying on her bed with a doll next to her  She is awake but not alert not following commands she moves her eyes bilaterally blink to threat present  Pupils equal and reactive  Could not appreciate facial droop  Speech is nonsensical she is moaning in pain when I give her painful stimulus  Withdraws more on the right upper right lower extremities less on the left upper left lower  Spontaneous movements observed on the right upper extremity and also right        DATA:    Lab Results   Component Value Date    GLUCOSE 118 (H) 08/20/2024    CALCIUM 8.2 (L) 08/20/2024     08/20/2024    K 3.8 08/20/2024    K 3.8 08/20/2024    CO2 19.0 (L) 08/20/2024     (H) 08/20/2024    BUN 7 (L) 08/20/2024    CREATININE 0.46 (L) 08/20/2024    BCR 15.2 08/20/2024    ANIONGAP 8.0 08/20/2024     Lab Results   Component Value Date    WBC 15.07 (H) 08/18/2024    HGB 9.8 (L) 08/18/2024    HCT 29.7 (L) 08/18/2024    MCV 88.9 08/18/2024     (L) 08/18/2024       Lab review:   Sodium 145  Creatinine 0.46  Glucose 118  AST 44 and ALT 38    PTH 85.9    WBC 15.07  Hemoglobin 9.8 and platelets 138    Urine analysis positive nitrite leukocytes protein yeast and squamous epithelial cells done on 8/15/2024    B12 folate pending  TSH 2.07    Imaging review:   No brain imaging done this admission    CT head from previous hospitalization at [facility] reads as huge chronic cortical infarct but no imaging available    Diagnoses:  Acute metabolic encephalopathy  Severe hyponatremia    Recent urinary tract infection  Hyperlipidemia  Osteoarthritis  Anxiety/depression      Pre-stroke MRS: ?    Plan   -Unsure about patient's baseline, per the nurse patient was independently living prior to her admission at [facility] last month, after recent urinary tract infection she was sent to rehab, and there presented to [facility] with acute change in mentation.  -Differentials are broad at this time  including provoked seizure from hyponatremia, possible acute encephalopathy on top of underlying dementia.  -I am sure she must be having an old stroke possibly in the old right MCA region.  -Plan to get MRI brain with and without and EEG routine  -Check B12 folate urine tox  -Will Speak to family tomorrow morning    Will continue to follow      MDM   Reviewed: Previous charts, nursing notes and vitals   Reviewed: Previous labs and CT scan    Interpretation: Labs and CT scan   Total time providing critical care is :30-74 minutes. This excluded time spent performing separately reportable procedures and services  Consults :Neurology/Stroke    Please note that portions of this note were completed with a voice recognition program.     Jm Castaneda MD  Neuro Hospitalist /Vascular Neurology.

## 2024-08-21 NOTE — CASE MANAGEMENT/SOCIAL WORK
"Continued Stay Note  [facility]     Patient Name: Mimi Pinto  MRN: [id number]  Today's Date: 8/21/2024    Admit Date: 8/15/2024    Plan: Possible return to [facility] VS snf-   Discharge Plan       Row Name 08/21/24 1257       Plan    Plan Comments Received call from Laryr that facility at [facility] is planning for return as LTC-medicaid pending and working with granddaughter Cam on that. Patient may return as LTC to [facility].      Row Name 08/21/24 1232       Plan    Plan Possible return to [facility] LT VS snf-    Patient/Family in Agreement with Plan yes    Plan Comments Spoke with Larry at [facility] to clarify who [facility] was working with on placement for this patient. Larry states Granddaughter Cam is contact for facility. Call placed to Cam who states, \" I am her stepgranddaughter and some people are telling me I cannot make her decisions, but I am her only next of kin and I am seeking guardianship of her. Call placed to [employer] manager Jina to clarify next of kin heirarchy and Jina states if no other lving next of kin, step granddaughter is next of kin for decision making. Informed Cam and she states, \" I want to be involved in my grandmothers care and I will help her with any need.\" Discussed Palliative care team consult with Cam and she is agreeable for discussion for goals of care. Order received. Patient currently unable to work with PT/OT. May be more suitable for LTC placement and per Cam, patient with no resources. Cam states she had talked to [facility] about medicaid pending status. Informed Liaison Larry from [facility]. Bedside nurse and MD updated via epic chat.                    Alta Servin RN    "

## 2024-08-21 NOTE — CASE MANAGEMENT/SOCIAL WORK
"Continued Stay Note  [facility]     Patient Name: Mimi Pinto  MRN: [id number]  Today's Date: 8/21/2024    Admit Date: 8/15/2024    Plan: [facility] VS snf-   Discharge Plan       Row Name 08/21/24 1232       Plan    Plan [facility] return to [facility] VS snf-    Patient/Family in Agreement with Plan yes    Plan Comments Spoke with Larry at [facility] to clarify who [facility] was working with on placement for this patient. Larry states Granddaughter Cam is contact for facility. Call placed to Cam who states, \" I am her stepgranddaughter and some people are telling me I cannot make her decisions, but I am her only next of kin and I am seeking guardianship of her. Call placed to [employer] manager Jina to clarify next of kin heirafelicia and Jina states if no other lving next of kin, step granddaughter is next of kin for decision making. Informed Cam and she states, \" I want to be involved in my grandmothers care and I will help her with any need.\" Discussed Palliative care team consult with Cam and she is agreeable for discussion for goals of care. Order received. Patient currently unable to work with PT/OT. May be more suitable for LTC placement and per Cam, patient with no resources. Cam states she had talked to [facility] about medicaid pending status. Informed Liaison Larry from [facility]. Bedside nurse and MD updated via epic chat.                   Discharge Codes    No documentation.                 Expected Discharge Date and Time       Expected Discharge Date Expected Discharge Time    Aug 22, 2024               Alta Servin RN    "

## 2024-08-21 NOTE — PLAN OF CARE
Goal Outcome Evaluation:  Pt remains in CCU as telemetry patient. Disoriented and unable to follow commands. O2 Sats dropped into mid 80s after turning patient. Applied 12L HiFlo nasal cannula, oral suction and NT suctioned to bring Sats back into the 90s. Patient is currently on room air, sleeping. Will continue to monitor.

## 2024-08-21 NOTE — PROGRESS NOTES
[facility] Progress Note      Patient Name: Mimi Pinto  : 1950  MRN: [id number]  Primary Care Physician:  Yadira Watson MD  Date of admission: 8/15/2024    Subjective     Interval History:     Patient overnight developed an episode of hypoxia    She required aggressive suctioning with improved sats currently followed closely by respiratory therapy    Awaiting family meeti today to discuss goals of care     Review of Systems:   As noted above    Objective     Vitals:   Temp:  [97.4 °F (36.3 °C)-98.2 °F (36.8 °C)] 97.4 °F (36.3 °C)  Heart Rate:  [92-96] 92  Resp:  [18-21] 20  BP: (114-123)/(71-87) 123/84  Flow (L/min):  [3] 3    Intake/Output Summary (Last 24 hours) at 2024 0710  Last data filed at 2024 0500  Gross per 24 hour   Intake 2197.24 ml   Output 750 ml   Net 1447.24 ml       Physical Exam:    General Appearance: Chronically ill and deconditioned bilateral temporal wasting  Skin: warm and dry  HEENT: oral mucosa normal, nonicteric sclera.  Edentulous  Neck: supple, no JVD  Lungs: CTA  Heart: RRR, normal S1 and S2  Abdomen: soft, nontender, nondistended  : no palpable bladder  Extremities: no edema, cyanosis or clubbing  Neuro: Patient awake unable to establish a coherent conversation no obvious focalization,    Scheduled Meds:     enoxaparin, 40 mg, Subcutaneous, Nightly  folic acid 1 mg in sodium chloride 0.9 % 50 mL IVPB, 1 mg, Intravenous, Daily  ipratropium-albuterol, 3 mL, Nebulization, 4x Daily - RT  senna-docusate sodium, 2 tablet, Oral, BID  sodium chloride, 10 mL, Intravenous, Q12H  sodium chloride, 4 mL, Nebulization, BID - RT      IV Meds:   dextrose 5 % 980 mL with potassium chloride 40 mEq infusion, , Last Rate: Stopped (24 0658)        Results Reviewed:   I have personally reviewed the results from the time of this admission to 2024 07:10 EDT     Results from last 7 days   Lab Units 24  0523 24  1643 24  0425  08/18/24  2251 08/18/24  0900 08/17/24  0510 08/16/24  1752 08/16/24  0329   SODIUM mmol/L 145 146* 142  --  144 154*   < > 154*   POTASSIUM mmol/L 3.8  3.8 3.4* 3.0*   < > 2.9* 3.5  3.5   < > 4.1   CHLORIDE mmol/L 118* 115* 113*  --  114* 124*   < > 126*   CO2 mmol/L 19.0* 18.3* 17.0*  --  20.0* 20.0*   < > 17.0*   BUN mg/dL 7* 7* 8  --  11 20   < > 27*   CREATININE mg/dL 0.46* 0.48* 0.50*  --  0.63 0.75   < > 0.80   CALCIUM mg/dL 8.2* 8.1* 7.8*  --  8.2* 8.2*   < > 8.2*   BILIRUBIN mg/dL  --   --   --   --  0.6 0.8  --  0.6   ALK PHOS U/L  --   --   --   --  90 84  --  89   ALT (SGPT) U/L  --   --   --   --  38* 36*  --  38*   AST (SGOT) U/L  --   --   --   --  44* 34*  --  32   GLUCOSE mg/dL 118* 107* 145*  --  144* 89   < > 127*    < > = values in this interval not displayed.       Estimated Creatinine Clearance: 107.7 mL/min (A) (by C-G formula based on SCr of 0.46 mg/dL (L)).    Results from last 7 days   Lab Units 08/20/24  1704 08/20/24  0523 08/19/24  1643 08/18/24  1755 08/18/24  0900 08/15/24  1441   MAGNESIUM mg/dL  --   --   --   --  2.1 2.9*   PHOSPHORUS mg/dL 2.4* 2.1* 4.7*   < > 1.8* 3.4    < > = values in this interval not displayed.             Results from last 7 days   Lab Units 08/18/24  0925 08/17/24  0510 08/16/24  0452 08/15/24  1519 08/15/24  1441   WBC 10*3/mm3 15.07* 18.96* 16.54*  --  21.24*   HEMOGLOBIN g/dL 9.8* 10.2* 11.2*  --  14.7   HEMOGLOBIN, POC g/dL  --   --   --  14.8  --    PLATELETS 10*3/mm3 138* 123* 143  --  183       Results from last 7 days   Lab Units 08/15/24  1441   INR  1.24*       Assessment / Plan     ASSESSMENT:    - Hypernatremia secondary to encephalopathy currently on dextrose infusion.  Attempt to discontinue therapy caused worsening hyponatremia.  Following sodium trend closely  -Sepsis with lactic acidosis.  Patient completed course of cefepime   -Encephalopathy/vascular dementia per primary team avoiding sedatives  -Hypokalemia with hypophosphatemia  continues to require IV replacement  - Hypoxia conserving for secretions improved with respiratory therapy     PLAN:   Will hold dextrose IVF for 12 hours , then will resume IVF , while receiving aggressive respiratory therapy this AM   Every attempt to discontinue IV replacements causes worsening electrolyte disturbances unclear if the patient might need, long-term feeding alternatives.  Awaiting family meeting today to determined goals of care .   Continue surveillance labs. Pending labs this AM     Discussed with nursing staff     Thank you for involving us in the care of Mimi Pinto.  Please feel free to call with any questions.    Marc Ortiz MD  08/21/24  07:10 EDT    [facility]  [phone]    Please note that portions of this note were completed with a voice recognition program.

## 2024-08-21 NOTE — PROGRESS NOTES
Pulm/ CC Note    Patient acutely dropped oxygen saturation requiring increase in supplemental oxygen to 12L high flow nasal cannula.  Patient with diffuse rhonchi on exam, weak cough.  Oropharyngeal suctioning ongoing per nursing staff- patient requiring vigorous stimulation of the oropharynx to elicit cough.   Thick, copious, brown sections suctioned out of back of throat with improvement in SpO2 to 98%.    The following pulmonary toileting measures ordered:  Scheduled DuoNeb, hypertonic saline neb BID, CPT therapy, NT suctioning q4h PRN.  Keep NPO.    Noted plan for neurology to discuss patient's condition with family later today.  No immediate indication for invasive ventilation.

## 2024-08-21 NOTE — PROGRESS NOTES
"DOS: 2024  NAME: Mimi Pinto   : 1950  PCP: Person, Yadira Huang MD    Chief Complaint   Patient presents with    Abnormal Lab         Subjective: Pt seen in follow up, however the problem is new to me.  Patient lying in bed with eyes open but speech is incomprehensible at times.  States that she \"wants her mama\" and that she is hurting all over.  No family at bedside.  RN at bedside.    Objective:  Vital signs:   Vitals:    24 1025 24 1257 24 1300 24 1411   BP:  114/76 114/76    BP Location:  Left arm     Patient Position:  Lying     Pulse: 91  92 102   Resp: 18 20  18   Temp:  97.4 °F (36.3 °C)     TempSrc:  Oral     SpO2: 93% 96% 96% 90%   Weight:       Height:           Current Facility-Administered Medications:     acetaminophen (TYLENOL) tablet 650 mg, 650 mg, Oral, Q4H PRN, 650 mg at 24 0041 **OR** acetaminophen (TYLENOL) suppository 650 mg, 650 mg, Rectal, Q4H PRN, Eugenio Arrieta MD    aluminum-magnesium hydroxide-simethicone (MAALOX MAX) 400-400-40 MG/5ML suspension 15 mL, 15 mL, Oral, Q6H PRN, Eugenio Arrieta MD    sennosides-docusate (PERICOLACE) 8.6-50 MG per tablet 2 tablet, 2 tablet, Oral, BID, 2 tablet at 24 0941 **AND** polyethylene glycol (MIRALAX) packet 17 g, 17 g, Oral, Daily PRN **AND** bisacodyl (DULCOLAX) EC tablet 5 mg, 5 mg, Oral, Daily PRN **AND** bisacodyl (DULCOLAX) suppository 10 mg, 10 mg, Rectal, Daily PRN, Eugenio Arrieta MD    Calcium Replacement - Follow Nurse / BPA Driven Protocol, , Does not apply, PRN, Eugenio Arrieta MD    dextrose 5 % 980 mL with potassium chloride 40 mEq infusion, , Intravenous, Continuous, Marc Ortiz MD, Stopped at 24 0658    Enoxaparin Sodium (LOVENOX) syringe 40 mg, 40 mg, Subcutaneous, Nightly, Eugenio Arrieta MD, 40 mg at 24    folic acid 1 mg in sodium chloride 0.9 % 50 mL IVPB, 1 mg, Intravenous, Daily, Eugenio Arrieta MD, Last Rate: 100 mL/hr at 24, 1 mg at " 08/20/24 2128    ipratropium-albuterol (DUO-NEB) nebulizer solution 3 mL, 3 mL, Nebulization, 4x Daily - RT, Anna Buckley, APRN, 3 mL at 08/21/24 1411    Magnesium Standard Dose Replacement - Follow Nurse / BPA Driven Protocol, , Does not apply, PRMeenakshi WALL Ervin H., MD    nitroglycerin (NITROSTAT) SL tablet 0.4 mg, 0.4 mg, Sublingual, Q5 Min PRN, Eugenio Arrieta MD    ondansetron ODT (ZOFRAN-ODT) disintegrating tablet 4 mg, 4 mg, Oral, Q6H PRN **OR** ondansetron (ZOFRAN) injection 4 mg, 4 mg, Intravenous, Q6H PRN, Eugenio Arrieta MD    Phosphorus Replacement - Follow Nurse / BPA Driven Protocol, , Does not apply, PRN, Eugenio Arrieta MD    potassium chloride (KLOR-CON) packet 40 mEq, 40 mEq, Oral, Q4H, Eugenio Arrieta MD, 40 mEq at 08/21/24 1137    Potassium Replacement - Follow Nurse / BPA Driven Protocol, , Does not apply, PRN, Euegnio Arrieta MD    [COMPLETED] Insert Peripheral IV, , , Once **AND** sodium chloride 0.9 % flush 10 mL, 10 mL, Intravenous, PRN, Eugenio Arrieta MD    sodium chloride 0.9 % flush 10 mL, 10 mL, Intravenous, Q12H, Eugenio Arrieta MD, 10 mL at 08/21/24 0800    sodium chloride 0.9 % flush 10 mL, 10 mL, Intravenous, PRN, Eugenio Arrieta MD    sodium chloride 0.9 % infusion 40 mL, 40 mL, Intravenous, PRN, Eugenio Arrieta MD    sodium chloride 7 % nebulizer solution nebulizer solution 4 mL, 4 mL, Nebulization, BID - RT, Anna Buckley, APRN, 4 mL at 08/21/24 0640    PRN meds    acetaminophen **OR** acetaminophen    aluminum-magnesium hydroxide-simethicone    senna-docusate sodium **AND** polyethylene glycol **AND** bisacodyl **AND** bisacodyl    Calcium Replacement - Follow Nurse / BPA Driven Protocol    Magnesium Standard Dose Replacement - Follow Nurse / BPA Driven Protocol    nitroglycerin    ondansetron ODT **OR** ondansetron    Phosphorus Replacement - Follow Nurse / BPA Driven Protocol    Potassium Replacement - Follow Nurse / BPA Driven Protocol    [COMPLETED] Insert  "Peripheral IV **AND** sodium chloride    sodium chloride    sodium chloride    No current facility-administered medications on file prior to encounter.     Current Outpatient Medications on File Prior to Encounter   Medication Sig    docusate sodium (COLACE) 100 MG capsule Take 1 capsule by mouth 2 (Two) Times a Day.    nystatin (MYCOSTATIN) 100,000 unit/mL suspension Swish and spit 5 mL 4 (Four) Times a Day.    selenium sulfide (SELSUN) 1 % lotion Apply 1 Application topically to the appropriate area as directed 2 (Two) Times a Day. Cleanse under bilateral breasts with selsun blue until healed       General appearance: Chronically ill-appearing female, alert, semi-cooperative  HEENT: Normocephalic, atraumatic, poor oral cavity    Neurological:   MS: Awake, did not answer any orientation questions, garbled speech  CN: + blink to threat bilaterally, tracked me to the left and right  Motor: + dorsiflexion but no attempt to lift of upper or lower extremities, made mild attempt to resist on RUE  Sensory: withdrew to light touch/painful stimuli  Coordination: ANA MARIA  Gait and station: ANA MARIA/deferred  Rapid alternating movements: ANA MARIA    Laboratory results:  Lab Results   Component Value Date    TSH 2.070 08/15/2024     Lab Results   Component Value Date    [id number] 255 08/20/2024     No results found for: \"HGBA1C\"  Lab Results   Component Value Date    GLUCOSE 88 08/21/2024    BUN 6 (L) 08/21/2024    CREATININE 0.45 (L) 08/21/2024    BCR 13.3 08/21/2024    K 3.6 08/21/2024    CO2 17.9 (L) 08/21/2024    CALCIUM 8.3 (L) 08/21/2024    ALBUMIN 2.3 (L) 08/21/2024    AST 44 (H) 08/18/2024    ALT 38 (H) 08/18/2024     Lab Results   Component Value Date    WBC 15.07 (H) 08/18/2024    HGB 9.8 (L) 08/18/2024    HCT 29.7 (L) 08/18/2024    MCV 88.9 08/18/2024     (L) 08/18/2024     Brief Urine Lab Results  (Last result in the past 365 days)        Color   Clarity   Blood   Leuk Est   Nitrite   Protein   CREAT   Urine HCG        " "08/15/24 1507 Dark Yellow   Clear   Negative   Small (1+)   Positive   30 mg/dL (1+)                 Blood Culture   Date Value Ref Range Status   08/15/2024 No growth at 5 days  Final     No results found for: \"BCIDPCR\", \"CXREFLEX\", \"CSFCX\", \"CULTURETIS\"  No results found for: \"CULTURES\", \"HSVCX\", \"URCX\"  No results found for: \"EYECULTURE\", \"GCCX\", \"HSVCULTURE\", \"LABHSV\"  No results found for: \"LEGIONELLA\", \"MRSACX\", \"MUMPSCX\", \"MYCOPLASCX\"  No results found for: \"NOCARDIACX\", \"STOOLCX\"  No results found for: \"THROATCX\", \"UNSTIMCULT\", \"URINECX\", \"CULTURE\", \"VZVCULTUR\"  No results found for: \"VIRALCULTU\", \"WOUNDCX\"  Pain Management Panel          Latest Ref Rng & Units 8/21/2024   Pain Management Panel   Barbiturates Screen, Urine Negative Negative    Benzodiazepine Screen, Urine Negative Negative    Cocaine Screen, Urine Negative Negative    Fentanyl, Urine Negative Negative    Methadone Screen , Urine Negative Negative       Details                 Folate <2.00  Phosphorus 1.8    Review and interpretation of imaging:  EEG: Mild diffuse slowing of the background consistent with a mild diffuse encephalopathy.  No epileptiform activity seen.    Impression/Assessment:  This is a 73-year-old female with no documented past medical history other than recent admission to [facility] for bacterial conjunctivitis, UTI, and sepsis  last month.  Reportedly she was treated with antibiotics and was discharged to a skilled nursing facility.  Unclear of her baseline but appears she was living alone prior chart review and was found by her  on the floor covered in feces.  In the field EMS noted that she was altered and found to be hypotensive and tachycardic.    On admission she was found to have severe hypernatremia with a sodium level of 164.  She had leukocytosis but was afebrile.  So far no infectious source has been identified and her empiric antibiotics that were initiated on admission have been discontinued.  In " addition to her hypernatremia she has also been found to have several other electrolyte abnormalities with multiple attempts to discontinue IV replacements however she develops worsening electrolyte disturbances.  Overnight she developed hypoxia and was unable to clear her secretions requiring frequent suctioning.  Core track tube was also placed due to dysphagia development and inability to clear her secretions.  She has not had any improvement in her mental status since admission.    Diagnosis:  Acute encephalopathy, unclear source  Dysphagia  Electrolyte derangements including hypernatremia  B12 deficiency  Folate deficiency    Plan:  - Her EEG findings were consistent with an encephalopathy.  - Awaiting MRI brain. Last head imaging was noncontrasted CT head on 7/26 that did not show any acute findings, enlarged ventricles were noted.  - Her UDS was negative. She has B12 and folate deficiency. With her poor PO intake at home and electrolyte derangements suspect she has likely been dealing with malnourishment at home. Her oral cavity is very dry. Nutrition following and cortrak has been placed.  - Will start B12 and folate replacement.  - Will check Vit E, A and D.    - I see multiple serum serology test pending per the primary team including heavy metal and autoimmune panels.  - Nephrology following for electrolyte derangements  - Reported family meeting today however family unable to be contacted, we are around for questions/participation when this is scheduled.   Will follow.     Case discussed with patient, RN, and Dr. Castaneda, and he agrees with plan above.     ANAMARIA Martinez    I spent 40 minutes of critical care time caring for patient on todays date of service. This time includes time spent by me in the following activities: obtaining and reviewing a separately obtained history, performing a medically appropriate examination and evaluation, counseling and educating the patient on diagnosis and  treatment, ordering medications, tests, referring and communicating with other health care professionals and documenting information in the medical record.

## 2024-08-21 NOTE — PLAN OF CARE
Goal Outcome Evaluation:         Vss; waxing and waning alertness throughout day; no comprehensible speech; 4-6L NC throughout the shift. CT chest completed this afternoon. Cortrak placed, TF initated and is tolerating well. Awaiting MRI. Turned q2 with wedge. Grand Daughter Priyanka to be contacted for future planning.

## 2024-08-21 NOTE — CONSULTS
Visited patient at bedside after hearing patient calling out in room.  introduced self to patient and patient responded with some eye contact and unintelligible sounds.  spoke kind words to patient, reminding her she is safe and cared for and encouraging calming breaths when she is worried. Patient was able to take calm breaths and nod responsively.  also prayed for patient after patient nodded that this was alright.     Pastoral care remains available for support.

## 2024-08-21 NOTE — CONSULTS
"Nutrition Services    Patient Name:  Mimi Pinto  YOB: 1950  MRN: [id number]  Admit Date:  8/15/2024  Assessment Date:  08/21/24    Summary: Nutrition Consult for TF assessment  This is a 74 yo female with encephalopathy, hypernatremia, and dysphagia, who overnight developed an episode of hypoxia. NG cortrak placed and TF's to begin. Will advance slowly due to risk of refeeding syndrome.   Labs: phos 1.8, alb 2.3.  Skin: stage 2 pressure injury coccyx  Meds: D5 at 60ml/hr, folic acid, kcl, percolace, Nacl, lovenox    Plan/Recommendation  Will begin TF's with IsoSource HN at 20ml/hr and advance slowly to goal of 55ml/h  Water flushes [id number] while on IVFs  Will monitor labs and replace electrolytes as needed.   RD to follow    CLINICAL NUTRITION ASSESSMENT      Reason for Assessment Cortrak Placement, Physician Consult, TF Assessment     Diagnosis/Problem   Encephalopathy, hypernatremia, sepsis with lactic acidosis   Medical/Surgical History History reviewed. No pertinent past medical history.    History reviewed. No pertinent surgical history.     Anthropometrics        Current Height  Current Weight  BMI kg/m2 Height: 154.9 cm (61\")  Weight: 82.5 kg (181 lb 14.1 oz) (08/21/24 0608)  Body mass index is 34.37 kg/m².   Adjusted BMI (if applicable)    BMI Category Obese, Class I (30 - 34.9)   Ideal Body Weight (IBW) 105lb   Usual Body Weight (UBW) 160's   Weight Trend Gain   Weight History Wt Readings from Last 30 Encounters:   08/21/24 0608 82.5 kg (181 lb 14.1 oz)   08/20/24 0500 70 kg (154 lb 5.2 oz)   08/19/24 0601 73.2 kg (161 lb 6 oz)   08/18/24 0606 74.2 kg (163 lb 9.3 oz)   08/17/24 0527 73.1 kg (161 lb 2.5 oz)   08/16/24 2030 73.7 kg (162 lb 7.7 oz)   08/16/24 0338 73.5 kg (162 lb 0.6 oz)   08/15/24 1621 70.1 kg (154 lb 9.6 oz)      --  Labs       Pertinent Labs    Results from last 7 days   Lab Units 08/21/24  0817 08/20/24  0523 08/19/24  1643 08/18/24  2251 08/18/24  0900 08/17/24  0510 " "08/16/24  1752 08/16/24  0329   SODIUM mmol/L 142 145 146*   < > 144 154*   < > 154*   POTASSIUM mmol/L 3.6 3.8  3.8 3.4*   < > 2.9* 3.5  3.5   < > 4.1   CHLORIDE mmol/L 112* 118* 115*   < > 114* 124*   < > 126*   CO2 mmol/L 17.9* 19.0* 18.3*   < > 20.0* 20.0*   < > 17.0*   BUN mg/dL 6* 7* 7*   < > 11 20   < > 27*   CREATININE mg/dL 0.45* 0.46* 0.48*   < > 0.63 0.75   < > 0.80   CALCIUM mg/dL 8.3* 8.2* 8.1*   < > 8.2* 8.2*   < > 8.2*   BILIRUBIN mg/dL  --   --   --   --  0.6 0.8  --  0.6   ALK PHOS U/L  --   --   --   --  90 84  --  89   ALT (SGPT) U/L  --   --   --   --  38* 36*  --  38*   AST (SGOT) U/L  --   --   --   --  44* 34*  --  32   GLUCOSE mg/dL 88 118* 107*   < > 144* 89   < > 127*    < > = values in this interval not displayed.     Results from last 7 days   Lab Units 08/21/24  0817 08/18/24  1755 08/18/24  0925 08/18/24  0900 08/15/24  1519 08/15/24  1441   MAGNESIUM mg/dL  --   --   --  2.1  --  2.9*   PHOSPHORUS mg/dL 1.8*   < >  --  1.8*  --  3.4   HEMOGLOBIN g/dL  --   --  9.8*  --    < > 14.7   HEMOGLOBIN, POC   --   --   --   --    < >  --    HEMATOCRIT %  --   --  29.7*  --    < > 44.8   HEMATOCRIT POC   --   --   --   --    < >  --    WBC 10*3/mm3  --   --  15.07*  --    < > 21.24*   ALBUMIN g/dL 2.3*   < >  --  2.5*   < > 3.8    < > = values in this interval not displayed.     Results from last 7 days   Lab Units 08/18/24  0925 08/17/24  0510 08/16/24  0452 08/15/24  1441   INR   --   --   --  1.24*   APTT seconds  --   --   --  23.5   PLATELETS 10*3/mm3 138* 123* 143 183     No results found for: \"COVID19\"  No results found for: \"HGBA1C\"       Medications           Scheduled Medications enoxaparin, 40 mg, Subcutaneous, Nightly  folic acid 1 mg in sodium chloride 0.9 % 50 mL IVPB, 1 mg, Intravenous, Daily  ipratropium-albuterol, 3 mL, Nebulization, 4x Daily - RT  potassium chloride, 40 mEq, Oral, Q4H  senna-docusate sodium, 2 tablet, Oral, BID  sodium chloride, 10 mL, Intravenous, " Q12H  sodium chloride, 4 mL, Nebulization, BID - RT       Infusions dextrose 5 % 980 mL with potassium chloride 40 mEq infusion, , Last Rate: Stopped (08/21/24 0658)       PRN Medications   acetaminophen **OR** acetaminophen    aluminum-magnesium hydroxide-simethicone    senna-docusate sodium **AND** polyethylene glycol **AND** bisacodyl **AND** bisacodyl    Calcium Replacement - Follow Nurse / BPA Driven Protocol    Magnesium Standard Dose Replacement - Follow Nurse / BPA Driven Protocol    nitroglycerin    ondansetron ODT **OR** ondansetron    Phosphorus Replacement - Follow Nurse / BPA Driven Protocol    Potassium Replacement - Follow Nurse / BPA Driven Protocol    [COMPLETED] Insert Peripheral IV **AND** sodium chloride    sodium chloride    sodium chloride     Physical Findings          General Findings disoriented, on oxygen therapy   Oral/Mouth Cavity tooth or teeth missing   Edema  2+ (mild)   Gastrointestinal last bowel movement: 8/20   Skin  pressure injury: coccyx stage 2   Tubes/Drains/Lines Cortrak, NG tube, bridle in place   NFPE No clinical signs of muscle wasting or fat loss   --  Estimated/Assessed Needs        Current Weight  Weight: 82.5 kg (181 lb 14.1 oz) (08/21/24 0608)       Energy Requirements    Weight for Calculation 82.5 kg   Method for Estimation  15 kcal/kg, 20 kcal/kg   EST Needs (kcal/day) 5980-6036       Protein Requirements    Weight for Calculation 82.5 kg   EST Protein Needs (g/kg) 0.8 gm/kg, 1.0 gm/kg   EST Daily Needs (g/day) 66-82       Fluid Requirements     Method for Estimation 1 mL/kcal    EST Needs (mL/day)      Current Nutrition Orders & Evaluation of Intake       Oral Nutrition     Food Allergies NKFA   Current PO Diet NPO Diet NPO Type: Strict NPO   Supplement n/a   PO Evaluation     % PO Intake npo    Factors Affecting Intake: swallow impairment   --  PES STATEMENT / NUTRITION DIAGNOSIS      Nutrition Dx Problem  Problem: Inadequate Oral Intake  Etiology: Medical  Diagnosis - encephalopathy    Signs/Symptoms: NPO     NUTRITION INTERVENTION / PLAN OF CARE      Intervention Goal(s) Maintain nutrition status, Reduce/improve symptoms, Meet estimated needs, Disease management/therapy, Initiate TF/PN, Tolerate TF/PN at goal, and No significant weight loss         RD Intervention/Action Continue to monitor, Care plan reviewed, and Recommend/order: enteral   --      Prescription/Orders:       PO Diet       Supplements       Enteral Nutrition       Parenteral Nutrition    New Prescription Ordered?    --   Enteral Prescription:     Enteral Route NG    TF Delivery Method Continuous    Enteral Product Isosource HN    Modular None    Propofol Rate/Kcal     TF Start Rate  20 mL/hr    TF Goal Rate  55 mL/hr    Free Water Flush 30 mL Q 4 hr    Provision at Goal:          Calories 1584 kcal, meets 100% needs         Protein  71 gm protein, meets 100% needs         Fluid (mL) 1069 mL free water + 180 mL in flushes         Monitor/Evaluation Per protocol, I&O, Pertinent labs, EN delivery/tolerance, Weight, Skin status, GI status, Symptoms, Swallow function   Discharge Plan/Needs Pending clinical course   --    RD to follow per protocol.      Electronically signed by:  Lin Hdez RD  08/21/24 12:03 EDT

## 2024-08-21 NOTE — PROGRESS NOTES
Dr. LE Arrieta    [facility]        Patient ID:  Name:  Mimi Pinto  MRN:  [id number]  1950  73 y.o.  female            CC/Reason for visit: Dysphagia, acute hypoxemia, and many other medical problem    Interval hx: Patient had an episode last night of choking on her own secretions.  She is unable to clear her own secretions.  She is having dysphagia, gurgling sounds and no gag reflex, no cough reflex.  I cannot understand her.  She is disoriented and occasionally will mumble or moan.  Her words are incomprehensible    ROS: Unobtainable    Vitals:  Vitals:    08/20/24 2352 08/21/24 0608 08/21/24 0641 08/21/24 0734   BP: 123/84   112/68   BP Location: Left arm   Left arm   Patient Position: Lying   Lying   Pulse: 92  92    Resp: 21 20 20   Temp: 97.4 °F (36.3 °C)   98.2 °F (36.8 °C)   TempSrc: Axillary   Oral   SpO2: 96%  99%    Weight:  82.5 kg (181 lb 14.1 oz)     Height:               Body mass index is 34.37 kg/m².    Intake/Output Summary (Last 24 hours) at 8/21/2024 0840  Last data filed at 8/21/2024 0500  Gross per 24 hour   Intake 2197.24 ml   Output 750 ml   Net 1447.24 ml       Exam:  GEN:  Appears chronically ill  Alert, awake, mumbles incomprehensible words,.   No gag reflex.  Some audible gurgling sounds in the back of the throat  LUNGS: Some scattered coarse breath sounds bilat, no use of accessory muscles  CV:  Normal S1S2, without murmur, no edema  ABD:  Non tender, no enlarged liver or masses      Scheduled meds:  enoxaparin, 40 mg, Subcutaneous, Nightly  folic acid 1 mg in sodium chloride 0.9 % 50 mL IVPB, 1 mg, Intravenous, Daily  ipratropium-albuterol, 3 mL, Nebulization, 4x Daily - RT  senna-docusate sodium, 2 tablet, Oral, BID  sodium chloride, 10 mL, Intravenous, Q12H  sodium chloride, 4 mL, Nebulization, BID - RT      IV meds:                      dextrose 5 % 980 mL with potassium chloride 40 mEq infusion, , Last Rate: Stopped (08/21/24 9165)        Data  Review:   I reviewed the patient's medications and new clinical results.            Results from last 7 days   Lab Units 08/20/24  0523 08/19/24  1643 08/19/24  0427 08/18/24  2251 08/18/24  0925 08/18/24  0900 08/17/24  0510 08/16/24  1752 08/16/24  0452 08/16/24  0329 08/15/24  1519 08/15/24  1441   SODIUM mmol/L 145 146* 142  --   --  144 154*   < >  --  154*   < > 164*   POTASSIUM mmol/L 3.8  3.8 3.4* 3.0*   < >  --  2.9* 3.5  3.5   < >  --  4.1   < > 3.8   CHLORIDE mmol/L 118* 115* 113*  --   --  114* 124*   < >  --  126*   < > 126*   CO2 mmol/L 19.0* 18.3* 17.0*  --   --  20.0* 20.0*   < >  --  17.0*   < > 24.2   BUN mg/dL 7* 7* 8  --   --  11 20   < >  --  27*   < > 30*   CREATININE mg/dL 0.46* 0.48* 0.50*  --   --  0.63 0.75   < >  --  0.80   < > 1.28*   CALCIUM mg/dL 8.2* 8.1* 7.8*  --   --  8.2* 8.2*   < >  --  8.2*   < > 10.4   BILIRUBIN mg/dL  --   --   --   --   --  0.6 0.8  --   --  0.6  --  0.9   ALK PHOS U/L  --   --   --   --   --  90 84  --   --  89  --  115   ALT (SGPT) U/L  --   --   --   --   --  38* 36*  --   --  38*  --  52*   AST (SGOT) U/L  --   --   --   --   --  44* 34*  --   --  32  --  46*   GLUCOSE mg/dL 118* 107* 145*  --   --  144* 89   < >  --  127*   < > 170*   WBC 10*3/mm3  --   --   --   --  15.07*  --  18.96*  --  16.54*  --   --  21.24*   HEMOGLOBIN g/dL  --   --   --   --  9.8*  --  10.2*  --  11.2*  --   --  14.7   HEMOGLOBIN, POC   --   --   --   --   --   --   --   --   --   --    < >  --    PLATELETS 10*3/mm3  --   --   --   --  138*  --  123*  --  143  --   --  183   INR   --   --   --   --   --   --   --   --   --   --   --  1.24*   PROBNP pg/mL  --   --   --   --   --   --   --   --   --   --   --  631.0   PROCALCITONIN ng/mL  --   --   --   --   --  0.33*  --   --   --   --   --  0.40*    < > = values in this interval not displayed.     Results from last 7 days   Lab Units 08/15/24  1548 08/15/24  1537   BLOODCX  No growth at 5 days No growth at 5 days         Results  from last 7 days   Lab Units 08/15/24  2121 08/15/24  1441   CK TOTAL U/L  --  157   HSTROP T ng/L 30* 30*            ASSESSMENT:   Encephalopathy   Severe dysphagia, no gag reflex  severe Hypernatremia  Severe Hyperchloremia  Lactic acidosis  Urinary retention  Sepsis  HLD  OA  Depression  Anxiety  UTI  Altered Mental Status  Recent prolonged hospitalization after being found down with rhabdomyolysis and uti  Granulomatous disease significant on chest imaging  Thrombocytopenia  Stool impaction      PLAN:  Patient's condition is worse.  She is unable to handle her own saliva and secretions.  She had an episode of choking and required NT suctioning and high flow oxygen last night.  After repositioning the patient and vigorous deep suctioning her oxygenation improved.  They removed abundant secretions from her hypopharynx.  The patient has dysphagia and no gag reflex, no cough reflex.  She remains disoriented, with garbled speech, does not follow commands consistently.  We have sent blood serology for heavy metal screening, autoimmune screening panel.  We are sending urine tests for light chain gammopathy, ordering serum protein electrophoresis.  Her parathyroid hormone is elevated.  Unclear whether she is having some type of renal tubular acidosis, proximal.    We are trying to obtain CT scan of the chest and MRI of the brain.  Neurology is following along.  Continue strict nothing by mouth.  Provide nutrition through feeding tube as well as free water.  She is also having urinary retention as per frequent bladder scans.  She has external urinary catheter.  We will BladderScan her 1 more time today, and if she has more than 250 cc in her bladder she will need an indwelling urinary bladder Cardozo catheter inserted.    This patient has several chronic medical conditions, and now several acute medical illnesses.  Extensive amount of data was reviewed.  Images were directly visualized by me.  This patient warrants high  complexity medical decision-making.        Eugenio Arrieta MD  8/21/2024

## 2024-08-21 NOTE — PLAN OF CARE
Goal Outcome Evaluation:              Outcome Evaluation: Discussed with RN. Pt is not appropriate for reeval of swallow, having difficulty managing secretions. Cortrak to be placed today. ST to sign off at this time. Please reconsult when pt is appropriate for a swallow eval.      Anticipated Discharge Disposition (SLP): unknown

## 2024-08-22 NOTE — PROGRESS NOTES
"Nutrition Services    Patient Name:  Mimi Pinto  YOB: 1950  MRN: [id number]  Admit Date:  8/15/2024  Assessment Date:  08/22/24    CLINICAL NUTRITION    Comments: Follow up for tube feeding   Pt receiving IsoSource HN at 55ml/hr via NG cortrak. Pt tolerating TF's at goal rate. Water flushes adjusted to [id number].  Labs Na 144, glu 149  Last BM 8/20    TF goal--IsoSource HN at 55ml/hr, water flushes per MD  RD to continue to monitor per protocol.     Encounter Information         Reason For Encounter Follow up for TF    Current Issues Encephalopathy, hypernatremia, sepsis     Estimated Requirements         Calories  9034-4531 (15 kcal/kg, 20 kcal/kg)    Protein (gm)  66-82 (0.8 gm/kg, 1.0 gm/kg)    Fluid (mL)   (1 mL/kcal)     Current Nutrition Orders & Evaluation of Intake       Oral Nutrition     Current PO Diet NPO Diet NPO Type: Tube Feeding   Supplement n/a   PO Evaluation     Trending % PO Intake     Factors Affecting Intake  altered mental status      Enteral Nutrition     Enteral Route NG    TF Delivery Method Continuous    Propofol Rate/Kcal     Current TF/Rate (mL) Isosource HN @ 55 mL/hr    TF Goal Rate (mL) 55    Current Water Flush (mL) 100 Q 4 hr    Modular None    TF Residual  no or minimal residual    TF Tolerance tolerating    TF Observation Verified correct TF and water flush infusing per orders     Anthropometrics          Height    Weight Height: 154.9 cm (61\")  Weight: 82.5 kg (181 lb 14.1 oz) (08/21/24 0608)    BMI kg/m2 Body mass index is 34.37 kg/m².  Obese, Class I (30 - 34.9)    Weight trend Stable     Labs        Pertinent Labs Reviewed, listed below     Results from last 7 days   Lab Units 08/22/24  0641 08/21/24  1934 08/21/24  0817 08/20/24  0523 08/18/24  2251 08/18/24  0900 08/17/24  0510 08/16/24  1752 08/16/24  0329   SODIUM mmol/L 144  --  142 145   < > 144 154*   < > 154*   POTASSIUM mmol/L 3.8 4.5 3.6 3.8  3.8   < > 2.9* 3.5  3.5   < > 4.1   CHLORIDE mmol/L 113*  " "--  112* 118*   < > 114* 124*   < > 126*   CO2 mmol/L 20.2*  --  17.9* 19.0*   < > 20.0* 20.0*   < > 17.0*   BUN mg/dL 8  --  6* 7*   < > 11 20   < > 27*   CREATININE mg/dL 0.52*  --  0.45* 0.46*   < > 0.63 0.75   < > 0.80   CALCIUM mg/dL 8.4*  --  8.3* 8.2*   < > 8.2* 8.2*   < > 8.2*   BILIRUBIN mg/dL  --   --   --   --   --  0.6 0.8  --  0.6   ALK PHOS U/L  --   --   --   --   --  90 84  --  89   ALT (SGPT) U/L  --   --   --   --   --  38* 36*  --  38*   AST (SGOT) U/L  --   --   --   --   --  44* 34*  --  32   GLUCOSE mg/dL 149*  --  88 118*   < > 144* 89   < > 127*    < > = values in this interval not displayed.     Results from last 7 days   Lab Units 08/22/24  0641 08/18/24  1755 08/18/24  0925 08/18/24  0900 08/15/24  1519 08/15/24  1441   MAGNESIUM mg/dL  --   --   --  2.1  --  2.9*   PHOSPHORUS mg/dL 2.5  2.5   < >  --  1.8*  --  3.4   HEMOGLOBIN g/dL  --   --  9.8*  --    < > 14.7   HEMOGLOBIN, POC   --   --   --   --    < >  --    HEMATOCRIT %  --   --  29.7*  --    < > 44.8   HEMATOCRIT POC   --   --   --   --    < >  --    WBC 10*3/mm3  --   --  15.07*  --    < > 21.24*   ALBUMIN g/dL 2.3*   < >  --  2.5*   < > 3.8    < > = values in this interval not displayed.     Results from last 7 days   Lab Units 08/22/24  0641 08/18/24  0925 08/17/24  0510 08/16/24  0452 08/15/24  1441   INR   --   --   --   --  1.24*   APTT seconds  --   --   --   --  23.5   PLATELETS 10*3/mm3 238 138* 123* 143 183     No results found for: \"COVID19\"  No results found for: \"HGBA1C\"       Medications            Scheduled Medications cholecalciferol, 5,000 Units, Nasogastric, Daily  cyanocobalamin, 1,000 mcg, Intramuscular, Daily  enoxaparin, 40 mg, Subcutaneous, Nightly  folic acid 1 mg in sodium chloride 0.9 % 50 mL IVPB, 1 mg, Intravenous, Daily  ipratropium-albuterol, 3 mL, Nebulization, 4x Daily - RT  senna-docusate sodium, 2 tablet, Oral, BID  sodium chloride, 10 mL, Intravenous, Q12H  sodium chloride, 4 mL, Nebulization, " BID - RT  vitamin D, 50,000 Units, Nasogastric, Q7 Days        Infusions      PRN Medications   acetaminophen **OR** acetaminophen    aluminum-magnesium hydroxide-simethicone    senna-docusate sodium **AND** polyethylene glycol **AND** bisacodyl **AND** bisacodyl    Calcium Replacement - Follow Nurse / BPA Driven Protocol    Magnesium Standard Dose Replacement - Follow Nurse / BPA Driven Protocol    nitroglycerin    ondansetron ODT **OR** ondansetron    Phosphorus Replacement - Follow Nurse / BPA Driven Protocol    Potassium Replacement - Follow Nurse / BPA Driven Protocol    [COMPLETED] Insert Peripheral IV **AND** sodium chloride    sodium chloride    sodium chloride     Physical Findings          Physical Appearance confused, disoriented   Oral/Mouth Cavity dry mouth, tooth or teeth missing lesions   Edema  generalized   Gastrointestinal last bowel movement: 8/20   Skin  bruising, pressure injury: stage 2 bilateral coccyx   Tubes/Drains Cortrak, NG tube, bridle in place   NFPE Not indicated at this time     NUTRITION INTERVENTION / PLAN OF CARE  Intervention Goal         Intervention Goal(s) Reduce/improve symptoms, Meet estimated needs, Disease management/therapy, Tolerate TF/PN at goal, and No significant weight loss     Nutrition Intervention         RD Action Continue to monitor and Care plan reviewed     Prescription         Diet Prescription     Supplement Prescription    EN/PN Prescription    New Prescription Ordered? Continue same per protocol   --  Enteral Prescription:      Enteral Route NG    TF Delivery Method Continuous    Enteral Product Isosource HN    Modular None    Propofol Rate/Kcal      TF Start Rate  20 mL/hr    TF Goal Rate  55 mL/hr    Free Water Flush Per MD    Provision at Goal:           Calories 1584 kcal, meets 100% needs         Protein  71 gm protein, meets 100% needs         Fluid (mL) 1069 mL free water + flushes      Monitor/Evaluation        Monitor Per protocol, I&O, Pertinent  labs, EN delivery/tolerance, Weight, Skin status, GI status, Symptoms, POC/GOC   Discharge Needs Pending clinical course       Electronically signed by:  Lin Hdez RD  08/22/24 11:56 EDT

## 2024-08-22 NOTE — PROGRESS NOTES
"DOS: 2024  NAME: Mimi Pinto   : 1950  PCP: Yadira Watson MD  Chief Complaint   Patient presents with    Abnormal Lab     Neurology    Subjective: No significant neurological improvement overnight. Unable to understand speech. No family at bedside. Pt seen in follow up today, however the problem is new to the examiner.      Objective:  Vital signs: /64 (BP Location: Left arm, Patient Position: Lying)   Pulse 100   Temp 98.3 °F (36.8 °C) (Axillary)   Resp 16   Ht 154.9 cm (61\")   Wt 82.5 kg (181 lb 14.1 oz)   SpO2 95%   BMI 34.37 kg/m²       GEN: Chronically ill-appearing, NAD, v/s reviewed  HEENT: Normocephalic, atraumatic, dry mucus membraines  COR: RRR  Resp: Even and unlabored, congestion in upper airways  Extremities: Ecchymosis on extremities.     Neurological:   MS: Eyes open to verbal stimuli, does not regard, moans but does not attempt to speak, not following commands.   CN: Positive blink to threat bilaterally, vestibular color reflex intact, PERRL, no facial asymmetry  Motor: Withdraws to noxious stimuli in all 4 extremities, no abnormal movements  Sensory: Per motor exam  Coordination: Unable to test, not following commands    Scheduled Meds:cholecalciferol, 5,000 Units, Nasogastric, Daily  cyanocobalamin, 1,000 mcg, Intramuscular, Daily  enoxaparin, 40 mg, Subcutaneous, Nightly  folic acid 1 mg in sodium chloride 0.9 % 50 mL IVPB, 1 mg, Intravenous, Daily  ipratropium-albuterol, 3 mL, Nebulization, 4x Daily - RT  senna-docusate sodium, 2 tablet, Oral, BID  sodium chloride, 10 mL, Intravenous, Q12H  sodium chloride, 4 mL, Nebulization, BID - RT  vitamin D, 50,000 Units, Nasogastric, Q7 Days      Continuous Infusions:   PRN Meds:.  acetaminophen **OR** acetaminophen    aluminum-magnesium hydroxide-simethicone    senna-docusate sodium **AND** polyethylene glycol **AND** bisacodyl **AND** bisacodyl    Calcium Replacement - Follow Nurse / BPA Driven Protocol    Magnesium " "Standard Dose Replacement - Follow Nurse / BPA Driven Protocol    nitroglycerin    ondansetron ODT **OR** ondansetron    Phosphorus Replacement - Follow Nurse / BPA Driven Protocol    Potassium Replacement - Follow Nurse / BPA Driven Protocol    [COMPLETED] Insert Peripheral IV **AND** sodium chloride    sodium chloride    sodium chloride    Laboratory results:  Lab Results   Component Value Date    GLUCOSE 149 (H) 08/22/2024    CALCIUM 8.4 (L) 08/22/2024     08/22/2024    K 3.8 08/22/2024    CO2 20.2 (L) 08/22/2024     (H) 08/22/2024    BUN 8 08/22/2024    CREATININE 0.52 (L) 08/22/2024    BCR 15.4 08/22/2024    ANIONGAP 10.8 08/22/2024     Lab Results   Component Value Date    WBC 15.07 (H) 08/18/2024    HGB 9.8 (L) 08/18/2024    HCT 29.7 (L) 08/18/2024    MCV 88.9 08/18/2024     08/22/2024     No results found for: \"CHOL\"  No results found for: \"HDL\"  No results found for: \"LDL\"  No results found for: \"TRIG\"       Lab review:  B12 255  Folate less than 2  Vitamin D less than 6  SPEP consistent with hypoalbuminemia, no M spike  UDS negative  TSH 2.07  CK level 157  Review and interpretation of imaging: MR brain images viewed by me, no acute findings seen but scattered chronic microhemorrhages noted consistent with amyloid angiopathy.  MRI Brain With & Without Contrast    Result Date: 8/22/2024  BRAIN MRI WITH AND WITHOUT CONTRAST  HISTORY: Altered mental status nonresolving; A41.9-Sepsis, unspecified organism; R65.21-Severe sepsis with septic shock; E87.0-Hyperosmolality and hypernatremia  COMPARISON: None.  FINDINGS:  Multiplanar images of the head were obtained without and with gadolinium. No areas of restricted diffusion are seen to suggest acute infarct. There is atrophy. There is periventricular and deep white matter microangiopathic change. There is no midline shift or mass effect. Old lacunar infarct versus prominent perivascular space is noted within the right basal ganglia. Intracranial " flow voids appear intact. Multiple foci of susceptibility artifact are noted within the cerebellar hemispheres bilaterally, as well as the cerebral hemispheres bilaterally, although more significant on the right. These are likely related to chronic hemosiderin deposition. The distribution raises the possibility of amyloid angiopathy. Mucosal thickening is noted within the ethmoid sinuses. Postcontrast imaging does not demonstrate any abnormal enhancement. Trace fluid is noted within the right mastoid air cells.      1. No acute intracranial abnormality. Multiple foci of susceptibility artifact noted within the cerebellar hemispheres bilaterally, as well as both cerebral hemispheres. Appearance raises the possibility of amyloid angiopathy.   This report was finalized on 8/22/2024 4:40 AM by Dr. Bethanie Champion M.D on Workstation: [id number]      CT Chest Without Contrast Diagnostic    Result Date: 8/21/2024  CT CHEST WITHOUT CONTRAST  HISTORY: Further evaluation of pulmonary nodules.  TECHNIQUE: Radiation dose reduction techniques were utilized, including automated exposure control and exposure modulation based on body size. 3 mm images were obtained through the chest without the administration of IV contrast.  COMPARISON: Chest radiographs dating back to 8/15/2024. CT abdomen pelvis 8/15/2024.   FINDINGS: Right fourth fifth, sixth nondisplaced anterior rib fractures with callus formation, suggesting at least sub acuity.  Enteric tube within the stomach. Tip is outside the exam field-of-view. Heart is normal in size. Mild coronary artery calcifications. Small volume pericardial fluid. Nondilated main pulmonary artery and thoracic aorta. Numerous calcified mediastinal and hilar lymph nodes. Decompressed esophagus.  Respiratory motion limits evaluation of the lungs. Peribronchial consolidation with intermixed groundglass opacities throughout much of the right lower lobe. Mixed confluent collapse/consolidation  throughout the majority of the left lower lobe. Majority of the left lower lobe bronchi are not aerated. A minority small portion of the left lower lobe remains aerated superiorly. Innumerable calcified granulomas. No pleural effusion.       1. Exam is limited by motion. 2. Bilateral lower lobe pneumonitis/pneumonia. Majority of the left lower lobe is consolidated/collapsed. Recommend imaging follow-up to document clearance. 3. Innumerable bilateral calcified granulomas correlating with prior radiographs.  This report was finalized on 8/21/2024 3:05 PM by Dr. Conrad Dunn M.D on Workstation: [id number]      XR Chest 1 View    Result Date: 8/21/2024  SINGLE VIEW OF THE CHEST  HISTORY: Low saturations  COMPARISON: August 16, 2024  FINDINGS: Heart size is within normal limits. No pneumothorax, pleural effusion, or acute infiltrate is seen. There is calcification of the aorta. Innumerable nodules are seen throughout the lungs bilaterally.      No significant interval change.  This report was finalized on 8/21/2024 4:10 AM by Dr. Bethanie Champion M.D on Workstation: [id number]      EEG    Result Date: 8/20/2024  Table formatting from the original result was not included. EEG Report          # Indication: Altered mental status History: 73-year-old who had been recently admitted with altered mental status and a sodium of 163 with a urinary tract infection and was subsequently sent to a skilled unit in a conversant state.  She is readmitted with altered mental status and left arm flaccidity.  The study includes time locked video Medical diagnoses: Current Facility-Administered Medications Medication Dose Route Frequency Provider Last Rate Last Admin  acetaminophen (TYLENOL) tablet 650 mg  650 mg Oral Q4H PRN Eugenio Arrieta MD   650 mg at 08/20/24 0041  Or  acetaminophen (TYLENOL) suppository 650 mg  650 mg Rectal Q4H PRN Eugenio Arrieta MD      aluminum-magnesium hydroxide-simethicone (MAALOX MAX)  400-400-40 MG/5ML suspension 15 mL  15 mL Oral Q6H PRN Eugenio Arrieta MD      sennosides-docusate (PERICOLACE) 8.6-50 MG per tablet 2 tablet  2 tablet Oral BID Eugenio Arrieta MD   2 tablet at 08/19/24 0941  And  polyethylene glycol (MIRALAX) packet 17 g  17 g Oral Daily PRN Eugenio Arrieta MD      And  bisacodyl (DULCOLAX) EC tablet 5 mg  5 mg Oral Daily PRN Eugenio Arrieta MD      And  bisacodyl (DULCOLAX) suppository 10 mg  10 mg Rectal Daily PRN Eugenio Arrieta MD      Calcium Replacement - Follow Nurse / BPA Driven Protocol   Does not apply PRN Eugenio Arrieta MD      dextrose 5 % 980 mL with potassium chloride 40 mEq infusion   Intravenous Continuous AndreaacaMarc lindsey MD 60 mL/hr at 08/19/24 2134 New Bag at 08/19/24 2134  Enoxaparin Sodium (LOVENOX) syringe 40 mg  40 mg Subcutaneous Nightly Eugenio Arrieta MD   40 mg at 08/19/24 2102  folic acid 1 mg in sodium chloride 0.9 % 50 mL IVPB  1 mg Intravenous Daily Eugenio Arrieta MD      Magnesium Standard Dose Replacement - Follow Nurse / BPA Driven Protocol   Does not apply ELAYNEN Eugenio Arrieta MD      nitroglycerin (NITROSTAT) SL tablet 0.4 mg  0.4 mg Sublingual Q5 Min PRN Eugenio Arrieta MD      ondansetron ODT (ZOFRAN-ODT) disintegrating tablet 4 mg  4 mg Oral Q6H PRN Eugenio Arrieta MD      Or  ondansetron (ZOFRAN) injection 4 mg  4 mg Intravenous Q6H PRN Eugenio Arrieta MD      Phosphorus Replacement - Follow Nurse / BPA Driven Protocol   Does not apply Eugenio Bolivar MD      Potassium Replacement - Follow Nurse / BPA Driven Protocol   Does not apply ELAYNEN Eugenio Arrieta MD      sodium chloride 0.9 % flush 10 mL  10 mL Intravenous PRN Eugenio Arrieta MD      sodium chloride 0.9 % flush 10 mL  10 mL Intravenous Q12H Eugenio Arrieta MD   10 mL at 08/20/24 0938  sodium chloride 0.9 % flush 10 mL  10 mL Intravenous PRN Eugenio Arrieta MD      sodium chloride 0.9 % infusion 40 mL  40 mL Intravenous PRN Eugenio Arrieta MD     Time of  study: 26 minutes 17 seconds Technical summary: The 10-20 system was used for electrode placement  Background: The background rhythm was composed of 7 Hz moderate amplitude poorly regulated and sustained posteriorly dominant rhythm.  There are slower activities interspersed.  Sleep: The patient became more drowsy as noted by diffusely slower activities.  No vertex sharp transients or sleep spindles were seen  Hyperventilation: Not obtained  Photic stimulation: Photic stimulation was performed very/frequency showing no significant change in the background activity  EKG: Somewhat irregular rhythm tachycardic at about 100  Video: No unusual involuntary movements were seen on the video clips reviewed Impression: Abnormal EEG during wakefulness and early drowsiness showing mild diffuse slowing of the background consistent with a mild diffuse encephalopathy versus bihemispheric structural lesions.  No epileptiform activities were seen Dictated utilizing Dragon dictation.       Impression:  73-year-old female, smoker, with hyperlipidemia and anxiety/depression with unclear baseline who presented with from SNF with hypernatremia.      I reviewed her chart in care everywhere.  She was living alone in an income based senior apartment on the second floor up until recent admission.  She did have an ED visit to [facility] 7/15 after neighbors found her sitting in the yard.  She was found to have conjunctivitis and hypokalemia.  She was noted to be alert and oriented but seemed confused at that time. She went home from the ED.   Prior to that ED visit the last encounter in [facility] was in November 2022 when she saw her PCP for an annual visit.  She returned to [facility] ED 7/26-7/30 due to conjunctivitis, UTI, and sepsis after being found down at home, crawling around on the floor unable to ambulate for at least 2 weeks.  [facility] was contacted due to the condition of her apartment.  It was reported that she had never drove at baseline and was  dependent on family and neighbors to bring her groceries.      She was discharged to a nursing facility on 7/26 and sent back to the hospital 8/15 after labs showed severe hyponatremia with sodium level of 164.  On admission she was found to have leukocytosis but was afebrile.  No infectious source has been identified and empiric antibiotics that were initiated on admission have been discontinued.  In addition to hyponatremia she had several other electrolyte abnormalities and nutritional deficiency as well as hypoxia and difficulty clearing secretions requiring frequent suctioning.  Core track tube has been placed due to dysphagia and difficulty clearing secretions.  Despite improvement in hypernatremia mental status has not improved.    EEG completed 8/20 showed mild diffuse background slowing but no potential seizure activity.  MRI brain with and without contrast completed overnight showed no acute findings but showed multiple foci of susceptibility artifact in bilateral cerebral and cerebellar hemispheres consistent with amyloid angiopathy.    Diagnosis:  Acute encephalopathy in part related to hypernatremia, not improved with correction of sodium  Amyloid angiopathy  Dysphagia  Multiple electrolyte derangements  B12, folate, vitamin D deficiencies    Plan:  I did discuss at length with the patient's granddaughter Priyanka on the phone.  She describes that she used to talk to the patient every day until approximately 6 months ago the patient lost her phone and then she was only talking to her every 1 to 2 weeks at the most when patient called her from a neighbor's phone. The conversations became shorter and it seemed like the patient became avoidant. Priyanka would try to schedule visits with the patient but the patient would call and cancel or would be waiting for her outside so she did not go into the patient's apartment.  Her granddaughter reports that prior to  6 months ago the patient was of normal cognition but  had always been forgetful.  At baseline the patient never did drive and granddaughter or neighbors would get her groceries.  In July the patient's landlord had contacted the granddaughter to help with her bills but prior to that patient had not had any difficulty remembering to pay bills.  Granddaughter also noted when she did see the patient's apartment after she was admitted to the hospital she was surprised by the mass and accumulation of food remnants and hoarding.  Priyanka would like to evaluate for any potential reversible causes of AMS and is agreeable to LP.  Discussed with Dr Castaneda as well, will get lumbar puncture with routine studies and have not initial studies unremarkable no further workup from neurology standpoint.  Palliative consult noted, agree with GOC discussion. D/W Mary.  D/W Dr Castaneda. LP under fluoro order placed.  Will follow.     Total time:>50 min

## 2024-08-22 NOTE — PROGRESS NOTES
Dr. LE Arrieta    [facility] CORONARY CARE        Patient ID:  Name:  Mimi Pinto  MRN:  [id number]  1950  73 y.o.  female            CC/Reason for visit: Dysphagia, acute hypoxemia, and many other medical problem     Interval hx: Patient remains encephalopathic, confused.  Occasionally speaks a few words, but her words are incomprehensible.  There has been no neurological improvement.       ROS: Unobtainable    Vitals:  Vitals:    08/22/24 0700 08/22/24 0740 08/22/24 1052 08/22/24 1059   BP:  119/62  113/64   BP Location:  Left arm  Left arm   Patient Position:  Lying  Lying   Pulse: 93  100    Resp:  22 22 16   Temp:  97.1 °F (36.2 °C)  98.3 °F (36.8 °C)   TempSrc:  Oral  Axillary   SpO2: 97%  95%    Weight:       Height:               Body mass index is 34.37 kg/m².    Intake/Output Summary (Last 24 hours) at 8/22/2024 1520  Last data filed at 8/22/2024 0000  Gross per 24 hour   Intake 284 ml   Output 200 ml   Net 84 ml       Exam:  GEN:               Appears chronically ill  Somnolent.  Opens eyes to sternal rub but does not answer questions  LUNGS:           Some scattered coarse breath sounds bilat, no use of accessory muscles  CV:                  Normal S1S2, without murmur, no edema  ABD:                Non tender, no enlarged liver or masses      Scheduled meds:  cholecalciferol, 5,000 Units, Nasogastric, Daily  cyanocobalamin, 1,000 mcg, Intramuscular, Daily  enoxaparin, 40 mg, Subcutaneous, Nightly  folic acid 1 mg in sodium chloride 0.9 % 50 mL IVPB, 1 mg, Intravenous, Daily  ipratropium-albuterol, 3 mL, Nebulization, 4x Daily - RT  senna-docusate sodium, 2 tablet, Oral, BID  sodium chloride, 10 mL, Intravenous, Q12H  sodium chloride, 4 mL, Nebulization, BID - RT  vitamin D, 50,000 Units, Nasogastric, Q7 Days      IV meds:                           Data Review:   I reviewed the patient's medications and new clinical results.            Results from last 7 days   Lab Units 08/22/24  0641  08/21/24  1934 08/21/24  0817 08/20/24  0523 08/18/24  2251 08/18/24  0925 08/18/24  0900 08/17/24  0510 08/16/24  1752 08/16/24  0452 08/16/24  0329   0000   SODIUM mmol/L 144  --  142 145   < >  --  144 154*   < >  --  154*  --    POTASSIUM mmol/L 3.8 4.5 3.6 3.8  3.8   < >  --  2.9* 3.5  3.5   < >  --  4.1  --    CHLORIDE mmol/L 113*  --  112* 118*   < >  --  114* 124*   < >  --  126*  --    CO2 mmol/L 20.2*  --  17.9* 19.0*   < >  --  20.0* 20.0*   < >  --  17.0*  --    BUN mg/dL 8  --  6* 7*   < >  --  11 20   < >  --  27*  --    CREATININE mg/dL 0.52*  --  0.45* 0.46*   < >  --  0.63 0.75   < >  --  0.80  --    CALCIUM mg/dL 8.4*  --  8.3* 8.2*   < >  --  8.2* 8.2*   < >  --  8.2*  --    BILIRUBIN mg/dL  --   --   --   --   --   --  0.6 0.8  --   --  0.6  --    ALK PHOS U/L  --   --   --   --   --   --  90 84  --   --  89  --    ALT (SGPT) U/L  --   --   --   --   --   --  38* 36*  --   --  38*  --    AST (SGOT) U/L  --   --   --   --   --   --  44* 34*  --   --  32  --    GLUCOSE mg/dL 149*  --  88 118*   < >  --  144* 89   < >  --  127*  --    WBC 10*3/mm3  --   --   --   --   --  15.07*  --  18.96*  --  16.54*  --   --    HEMOGLOBIN g/dL  --   --   --   --   --  9.8*  --  10.2*  --  11.2*  --   --    PLATELETS 10*3/mm3 238  --   --   --   --  138*  --  123*  --  143  --    < >   PROCALCITONIN ng/mL  --   --   --   --   --   --  0.33*  --   --   --   --   --     < > = values in this interval not displayed.     Results from last 7 days   Lab Units 08/15/24  1548 08/15/24  1537   BLOODCX  No growth at 5 days No growth at 5 days         Results from last 7 days   Lab Units 08/15/24  2121   HSTROP T ng/L 30*     Results from last 7 days   Lab Units 08/21/24  1911   PH, ARTERIAL pH units 7.496*   PCO2, ARTERIAL mm Hg 28.9*   PO2 ART mm Hg 55.6*   O2 SATURATION ART % 91.5*   FLOW RATE lpm 4.0000   MODALITY  Cannula            ASSESSMENT:   Encephalopathy   Severe dysphagia, no gag reflex  severe  Hypernatremia  Severe Hyperchloremia  Lactic acidosis  Urinary retention  Sepsis  HLD  OA  Depression  Anxiety  UTI  Altered Mental Status  Recent prolonged hospitalization after being found down with rhabdomyolysis and uti  Granulomatous disease significant on chest imaging  Thrombocytopenia  Stool impaction      PLAN:  This patient's neurologic status continues without improvement.  She occasionally mumbles a few incomprehensible words but does not communicate much.  Spends most of the day with decrease level of consciousness.  Has developed significant dysphagia, lack of gag reflex, inability to clear her oral secretions, inability to protect her airway.  Her LISE panel and complement panel are normal.  She continues to have phosphorus wasting and metabolic derangements, as well as elevated PTH.  Nephrology has been helping and following along.  Patient has significant vitamin D deficiency with hypocalcemia.  Unfortunately the patient's condition is not improving, not responding to therapy despite 5 days of medical treatment.  Her only distant relative was located.  Neurology has been following the patient and they were able to speak to her, granddaughter named Priyanka.  She would like for lumbar puncture to be pursued for additional testing to look for any potential reversible causes of encephalopathy.  This patient has several chronic medical conditions, and now several acute medical illnesses.  Extensive amount of data was reviewed.  Images were directly visualized by me.  This patient warrants high complexity medical decision-making.              Eugenio Arrieta MD  8/22/2024

## 2024-08-22 NOTE — PROGRESS NOTES
[facility] Progress Note      Patient Name: Mimi Pinto  : 1950  MRN: [id number]  Primary Care Physician:  Yadira Watson MD  Date of admission: 8/15/2024    Subjective     Interval History:     Patient is still quite somnolent but interactive  Require aggressive respiratory therapy  To mobilize her secretions .  Core track catheter was placed and he started on feeds      Review of Systems:   As noted above    Objective     Vitals:   Temp:  [97.1 °F (36.2 °C)-98.6 °F (37 °C)] 97.1 °F (36.2 °C)  Heart Rate:  [] 93  Resp:  [18-22] 22  BP: (103-119)/(62-76) 119/62  Flow (L/min):  [2.5-4] 2.5    Intake/Output Summary (Last 24 hours) at 2024 0755  Last data filed at 2024 0000  Gross per 24 hour   Intake 494 ml   Output 1025 ml   Net -531 ml       Physical Exam:    General Appearance: Chronically ill and deconditioned bilateral temporal wasting  Skin: warm and dry  HEENT: oral mucosa normal, nonicteric sclera.  Edentulous  Neck: supple, no JVD  Lungs: CTA  Heart: RRR, normal S1 and S2  Abdomen: soft, nontender, nondistended  : no palpable bladder  Extremities: no edema, cyanosis or clubbing  Neuro: Patient awake unable to establish a coherent conversation no obvious focalization,    Scheduled Meds:     cholecalciferol, 5,000 Units, Nasogastric, Daily  cyanocobalamin, 1,000 mcg, Intramuscular, Daily  enoxaparin, 40 mg, Subcutaneous, Nightly  folic acid 1 mg in sodium chloride 0.9 % 50 mL IVPB, 1 mg, Intravenous, Daily  ipratropium-albuterol, 3 mL, Nebulization, 4x Daily - RT  senna-docusate sodium, 2 tablet, Oral, BID  sodium chloride, 10 mL, Intravenous, Q12H  sodium chloride, 4 mL, Nebulization, BID - RT  vitamin D, 50,000 Units, Nasogastric, Q7 Days      IV Meds:          Results Reviewed:   I have personally reviewed the results from the time of this admission to 2024 07:55 EDT     Results from last 7 days   Lab Units 24  1934 24  0804  08/20/24  0523 08/19/24  1643 08/18/24  2251 08/18/24  0900 08/17/24  0510 08/16/24  1752 08/16/24  0329   SODIUM mmol/L  --  142 145 146*   < > 144 154*   < > 154*   POTASSIUM mmol/L 4.5 3.6 3.8  3.8 3.4*   < > 2.9* 3.5  3.5   < > 4.1   CHLORIDE mmol/L  --  112* 118* 115*   < > 114* 124*   < > 126*   CO2 mmol/L  --  17.9* 19.0* 18.3*   < > 20.0* 20.0*   < > 17.0*   BUN mg/dL  --  6* 7* 7*   < > 11 20   < > 27*   CREATININE mg/dL  --  0.45* 0.46* 0.48*   < > 0.63 0.75   < > 0.80   CALCIUM mg/dL  --  8.3* 8.2* 8.1*   < > 8.2* 8.2*   < > 8.2*   BILIRUBIN mg/dL  --   --   --   --   --  0.6 0.8  --  0.6   ALK PHOS U/L  --   --   --   --   --  90 84  --  89   ALT (SGPT) U/L  --   --   --   --   --  38* 36*  --  38*   AST (SGOT) U/L  --   --   --   --   --  44* 34*  --  32   GLUCOSE mg/dL  --  88 118* 107*   < > 144* 89   < > 127*    < > = values in this interval not displayed.       Estimated Creatinine Clearance: 110.1 mL/min (A) (by C-G formula based on SCr of 0.45 mg/dL (L)).    Results from last 7 days   Lab Units 08/21/24  1934 08/21/24  0817 08/20/24  1704 08/18/24  1755 08/18/24  0900 08/15/24  1441   MAGNESIUM mg/dL  --   --   --   --  2.1 2.9*   PHOSPHORUS mg/dL 1.7* 1.8* 2.4*   < > 1.8* 3.4    < > = values in this interval not displayed.             Results from last 7 days   Lab Units 08/22/24  0641 08/18/24  0925 08/17/24  0510 08/16/24  0452 08/15/24  1519 08/15/24  1441   WBC 10*3/mm3  --  15.07* 18.96* 16.54*  --  21.24*   HEMOGLOBIN g/dL  --  9.8* 10.2* 11.2*  --  14.7   HEMOGLOBIN, POC g/dL  --   --   --   --  14.8  --    PLATELETS 10*3/mm3 238 138* 123* 143  --  183       Results from last 7 days   Lab Units 08/15/24  1441   INR  1.24*       Assessment / Plan     ASSESSMENT:    - Hypernatremia secondary to encephalopathy .  Will discontinue dextrose fluids and will adjust the water flushes via core track based on sodium trend  -Sepsis with lactic acidosis.  Patient completed course of cefepime    -Encephalopathy/vascular dementia per primary team avoiding sedatives  -Hypokalemia with hypophosphatemia continues to require IV replacement  -Severe vitamin D deficiency and hypocalcemia causing secondary hyperparathyroidism patient started on vitamin D replacement.   -No evidence of renal tubular acidosis based on  ABG .  But mainly consistent with respiratory alkalosis and chronic metabolic alkalosis    PLAN:   Will adjust free water flushes 100 mL every 4 hours through core track , and will discontinue dextrose IV fluids   Appreciate dietary recommendation advised  Continue surveillance labs    Discussed with nursing staff     Thank you for involving us in the care of Mimi Pinto.  Please feel free to call with any questions.    Marc Ortiz MD  08/22/24  07:55 EDT    [facility]  [phone]    Please note that portions of this note were completed with a voice recognition program.

## 2024-08-22 NOTE — CONSULTS
.            [facility] Palliative Care Services    Palliative Care Initial Consult   Attending Physician: Eugenio Arrieta MD  Referring Provider: TROY Euceda     Reason for Referral: assistance with clarification of goals of care and hospice referral or discussion  Family/Support: granddaughter, Priyanka    Code Status and Medical Interventions: CPR (Attempt to Resuscitate); Full Support   Ordered at: 08/17/24 0822     Code Status (Patient has no pulse and is not breathing):    CPR (Attempt to Resuscitate)     Medical Interventions (Patient has pulse or is breathing):    Full Support     Goals of Care: Continue with current level of care, obtain LP to determine if any potential reversible cause and then reevaluate goals further.     HPI:   73 y.o. female with history of hyperlipidemia, osteoarthritis, and depression/anxiety. She had a recent hospitalization at another facility for sepsis and UTI. She resided at [facility] prior to admission.     Patient presented to [facility] on 8/15/2024 related to altered mental status. Workup in ER, revealed hypernatremia and concerns for sepsis and UTI. She was obtunded and transfer to critical care unit. She was started on IV fluids and consults placed for nephrology.  She continue altered mental status and neurology was consulted. She had EEG which was consistent with encephalopathy. She had CT of head with no acute findings and went for MRI of brain yesterday with no acute findings, however did reveal multiple foci of susceptibility artifact noted within the cerebellar hemispheres bilaterally, as well as both cerebral hemispheres,appearance raises the possibility of amyloid angiopathy.. She did have CT of chest yesterday and bilateral lower lobe pnuemonitis/pneumonia of concern with majority of the left lower lobe is consolidated/collapsed. She is currently on She was evaluated by  and due to lethargy and not eating she had  coretrak placed on 2024. She has had difficulty maintaining oral secretions and requiring aggressive respiratory therapy and oxygen therapy.  She is also noted to have severe vitamin d and calcium deficiencies. Due to her acute conditions and further discussion regarding dysphagia the palliative care team was consulted for support with goals of care.  Palliative Care Spoke With: other next of kin, granddaughter-Priyanka  Functional Status: Pt presents to PT with impaired strength, endurance, and balance limiting overall mobility. Pt transferred to EOB with dep A x2 and was unable to follow any commands to actively participate in any therapy today. 0/5 BLE strength and poor sitting balance requiring max A x1 throughout. Pt assisted back to supine and repositioned, noted no muscle tone at all in upper or lower extremities. Pt does not appear to be appropriate for continue rehab at this time given cognitive status and significant deconditioning. Per PT notes on 2024  Due to the Palliative Care Topics Discussed: palliative care, goals of care, care options, resuscitation status, [facility] scattered bed status, and discharge options we will establish an advance care plan.   Advance Care Planning   Advance Care Planning Discussion: see below          Review of Systems   Unable to perform ROS: Mental status change       1- Pain Assessment  CPOT Facial Expression: 0-->relaxed, neutral  CPOT Body Movements: 0-->absence of movements  CPOT Muscle Tension: 0-->relaxed  Ventilator Compliance/Vocalization: 0-->talking in normal tone or no sound  CPOT Score: 0  PAINAD Breathin-->normal  PAINAD Negative Vocalization: 0-->none  PAINAD Facial Expression: 0-->smiling or inexpressive  PAINAD Body Language: 0-->relaxed  PAINAD Consolability: 0-->no need to console  PAINAD Score: 0    History reviewed. No pertinent past medical history.  History reviewed. No pertinent surgical history.  Social History      Socioeconomic History    Marital status: Single   Tobacco Use    Smoking status: Unknown   Vaping Use    Vaping status: Never Used   Substance and Sexual Activity    Alcohol use: Defer    Drug use: Defer    Sexual activity: Defer       Current Facility-Administered Medications   Medication Dose Route Frequency Provider Last Rate Last Admin    acetaminophen (TYLENOL) tablet 650 mg  650 mg Oral Q4H PRN Eugenio Arrieta MD   650 mg at 08/21/24 2011    Or    acetaminophen (TYLENOL) suppository 650 mg  650 mg Rectal Q4H PRN Eugenio Arrieta MD        aluminum-magnesium hydroxide-simethicone (MAALOX MAX) 400-400-40 MG/5ML suspension 15 mL  15 mL Oral Q6H PRN Eugenio Arrieta MD        sennosides-docusate (PERICOLACE) 8.6-50 MG per tablet 2 tablet  2 tablet Oral BID Eugenio Arrieta MD   2 tablet at 08/21/24 2011    And    polyethylene glycol (MIRALAX) packet 17 g  17 g Oral Daily PRN Eugenio Arrieta MD        And    bisacodyl (DULCOLAX) EC tablet 5 mg  5 mg Oral Daily PRN Eugenio Arrieta MD        And    bisacodyl (DULCOLAX) suppository 10 mg  10 mg Rectal Daily PRN Eugenio Arrieta MD        Calcium Replacement - Follow Nurse / BPA Driven Protocol   Does not apply PRN Eugenio Arrieta MD        cholecalciferol (VITAMIN D3) tablet 5,000 Units  5,000 Units Nasogastric Daily Marc Ortiz MD   5,000 Units at 08/22/24 0848    cyanocobalamin injection 1,000 mcg  1,000 mcg Intramuscular Daily Deysi Gambino APRN   1,000 mcg at 08/22/24 0849    Enoxaparin Sodium (LOVENOX) syringe 40 mg  40 mg Subcutaneous Nightly Eugenio Arrieta MD   40 mg at 08/21/24 2011    folic acid 1 mg in sodium chloride 0.9 % 50 mL IVPB  1 mg Intravenous Daily Eugenio Arrieta  mL/hr at 08/22/24 0848 1 mg at 08/22/24 0848    ipratropium-albuterol (DUO-NEB) nebulizer solution 3 mL  3 mL Nebulization 4x Daily - RT Anna Buckley, APRN   3 mL at 08/22/24 0659    Magnesium Standard Dose Replacement - Follow Nurse / BPA Driven Protocol    Does not apply PRN Eugenio Arrieta MD        nitroglycerin (NITROSTAT) SL tablet 0.4 mg  0.4 mg Sublingual Q5 Min PRN Eugenio Arrieta MD        ondansetron ODT (ZOFRAN-ODT) disintegrating tablet 4 mg  4 mg Oral Q6H PRN Eugenio Arrieta MD        Or    ondansetron (ZOFRAN) injection 4 mg  4 mg Intravenous Q6H PRN Eugenio Arrieta MD        Phosphorus Replacement - Follow Nurse / BPA Driven Protocol   Does not apply PRN Eugenio Arrieta MD        Potassium Replacement - Follow Nurse / BPA Driven Protocol   Does not apply PREugenio Allison MD        sodium chloride 0.9 % flush 10 mL  10 mL Intravenous PRN Eugenio Arrieta MD        sodium chloride 0.9 % flush 10 mL  10 mL Intravenous Q12H Eugenio Arrieta MD   10 mL at 08/22/24 0849    sodium chloride 0.9 % flush 10 mL  10 mL Intravenous PRN Eugenio Arrieta MD        sodium chloride 0.9 % infusion 40 mL  40 mL Intravenous PRN Eugenio Arrieta MD        sodium chloride 7 % nebulizer solution nebulizer solution 4 mL  4 mL Nebulization BID - RT Anna Buckley, APRDUKE   4 mL at 08/22/24 0700    vitamin D (ERGOCALCIFEROL) capsule 50,000 Units  50,000 Units Nasogastric Q7 Days Marc Ortiz MD              acetaminophen **OR** acetaminophen    aluminum-magnesium hydroxide-simethicone    senna-docusate sodium **AND** polyethylene glycol **AND** bisacodyl **AND** bisacodyl    Calcium Replacement - Follow Nurse / BPA Driven Protocol    Magnesium Standard Dose Replacement - Follow Nurse / BPA Driven Protocol    nitroglycerin    ondansetron ODT **OR** ondansetron    Phosphorus Replacement - Follow Nurse / BPA Driven Protocol    Potassium Replacement - Follow Nurse / BPA Driven Protocol    [COMPLETED] Insert Peripheral IV **AND** sodium chloride    sodium chloride    sodium chloride    Allergies   Allergen Reactions    Penicillins Provider Review Needed    Sulfa Antibiotics Provider Review Needed     Attest that current medications reviewed  including but not limited  "to prescriptions, over-the counter, herbals and vitamin/mineral/dietary (nutritional) supplements for name, route of administration, type, dose and frequency and are current using all immediate resources available at time of dictation.      Intake/Output Summary (Last 24 hours) at 8/22/2024 1015  Last data filed at 8/22/2024 0000  Gross per 24 hour   Intake 494 ml   Output 325 ml   Net 169 ml       Physical Exam:    Diagnostics: Reviewed  /62 (BP Location: Left arm, Patient Position: Lying)   Pulse 93   Temp 97.1 °F (36.2 °C) (Oral)   Resp 22   Ht 154.9 cm (61\")   Wt 82.5 kg (181 lb 14.1 oz)   SpO2 97%   BMI 34.37 kg/m²     Constitutional:       General: Not in acute distress.     Appearance: Not in distress. Chronically ill-appearing and acutely ill-appearing.      Interventions: Nasal cannula in place.      Comments: NC @ 2.5 liter   Pulmonary:      Effort: Pulmonary effort is normal.      Breath sounds: Rhonchi (scattered throughout) present.   Cardiovascular:      PMI at left midclavicular line. Tachycardia present.   Edema:     Peripheral edema present.  Abdominal:      Comments: NGT right nare   Genitourinary:     Comments: F/c with fernando urine noted   Neurological:      Mental Status: Unresponsive.      Comments: Withdrawals to noxious stimuli  Doesn't respond to voice     Patient status: Disease state: Deteriorating despite treatments.  Functional status: Palliative Performance Scale Score: Performance 10% based on the following measures: Ambulation: Totally bed bound, Activity and Evidence of Disease: Unable to do any work, extensive evidence of disease, Self-Care: Total care required,  Intake: Mouth care only, LOC: Drowsy or comatose   Nutritional status: Albumin  2.3 on 8/22/2024 Body mass index is 34.37 kg/m².   Family support: The patient receives support from her granddaughter, Priyanka..  Advance Directives: Advance Directive Status: Patient does not have advance directive   POA/Healthcare " surrogate-n/a.        Impression/Problem List:    Encephalopathy  Sepsis  Severe dysphagia  Severe hypernatremia  Vitamin d deficiency    Urinary retention   Osteoarthritis   Depression   Anxiety         Recommendations/Plan:  1. Provide support with goals of care  2. Full code   3. Neurology to follow up with next of kin once LP results available        2.  Palliative care encounter  The patient is unable to participate in goals of care conversation secondary to impaired cognition. I called her listed of next of kin, granddaughter, Priyanka at 1315. Priyanka reports has no siblings and/or children. She doesn't have an advance directive. She provided a detailed history of patient. She reports up until about 6 months ago she would talk with the patient daily, however the patient lost her phone. She would then use neighbors phone and call periodically. Priyanka did attempt to arrange visits but the patient would make excuses for her not visit. She didn't think much of it because she had just had a baby and relocated to [facility] and she thought the patient didn't want to bother her. She states mid July the patient  called hear and  the patient had requested assistance with paying rent, which was out of the ordinary. Since that call the patient has progressively gotten worst and she has most recently visited the patient home and found it in poor conditions-hoarding. She reports filing an APS case at the recommendation of the  in July.  We did discuss hospital course and reviewed test- MRI findings at her request.  She is having a very difficult time with making decision and wants to make sure there isn't a reversible cause to her current altered state. She did request to talk to neurology and I spoke with ANAMARIA Syed who discussed with her. After discussion with neurology she is wanting to proceed with LP. We discussed in detail CODE status and medical interventions (particularly  CPR, ventilator and PEG placement). We reviewed risk vs benefits of such procedures. She is leaning towards DNR/DNI, but wants to process information further. I have also highly encouraged a visit in person to support her decision making process. She is aware she can inform medical team members at anytime if she desires to change CODE status. She does state she doesn't think she would want a PEG placed as well. She would like to wait for LP findings and then will make decision if she wants to change care structure. I did provide her with information regarding inpatient palliative care unit and hospice care if she desired to change care structure to comfort only. She was very appreciative of conversation and support provided. I will plan to follow up tomorrow for support. I spoke with ANAMARIA Syed and GREGOR Lovell.       Thank you for this consult and allowing us to participate in patient's plan of care. Palliative Care Team will continue to follow patient.     Time spent:90 minutes spent reviewing medical and medication records, assessing and examining patient, discussing with family, answering questions, providing some guidance about a plan and documentation of care, and coordinating care with other healthcare members, with > 50% time spent face to face.   30 minutes spent on advance care planning.    ANAMARIA Atwood  8/22/2024  10:15 EDT

## 2024-08-23 PROBLEM — Z51.5 END OF LIFE CARE: Status: ACTIVE | Noted: 2024-01-01

## 2024-08-23 NOTE — PROGRESS NOTES
.            [facility]iative Care Services    Palliative Care Daily Progress Note   Chief complaint-follow up support for patient/family, hospice referral/discussion, pain/symptom management, withdrawal of interventions, transfer to comfort care bed/unit, and comfort care    Code Status:   Code Status and Medical Interventions: CPR (Attempt to Resuscitate); Full Support   Ordered at: 24 0822     Code Status (Patient has no pulse and is not breathing):    CPR (Attempt to Resuscitate)     Medical Interventions (Patient has pulse or is breathing):    Full Support      Advanced Directives: Advance Directive Status: Patient does not have advance directive   Goals of Care: Ongoing.     S: Medical record reviewed. Events noted.  Patient declining. Tachy, febrile. Remains cognitively impaired.  No family at bedside and have visit per RN.  She is scheduled for LP at 2 pm today.       I reviewed labs, medical notes, nursing notes, MAR and I/O's.  LP pending. I spoke with Liliya Tovar, RN.        Review of Systems   Unable to perform ROS: Mental status change     Pain Assessment  CPOT and PAINAD Scales: CPOT (Critical-Care Pain Observation Tool)  CPOT Facial Expression: 0-->relaxed, neutral  CPOT Body Movements: 0-->absence of movements  CPOT Muscle Tension: 0-->relaxed  Ventilator Compliance/Vocalization: 0-->talking in normal tone or no sound  CPOT Score: 0  PAINAD Breathin-->normal  PAINAD Negative Vocalization: 0-->none  PAINAD Facial Expression: 0-->smiling or inexpressive  PAINAD Body Language: 0-->relaxed  PAINAD Consolability: 0-->no need to console  PAINAD Score: 0    O:     Intake/Output Summary (Last 24 hours) at 2024 0902  Last data filed at 2024 0700  Gross per 24 hour   Intake 1656 ml   Output 200 ml   Net 1456 ml       Diagnostics and current medications: Reviewed.    Current Facility-Administered Medications   Medication Dose Route Frequency Provider Last Rate Last  Admin    acetaminophen (TYLENOL) tablet 650 mg  650 mg Oral Q4H PRN Eugenio Arrieta MD   650 mg at 08/21/24 2011    Or    acetaminophen (TYLENOL) suppository 650 mg  650 mg Rectal Q4H PRN Eugenio Arrieta MD   650 mg at 08/22/24 2004    aluminum-magnesium hydroxide-simethicone (MAALOX MAX) 400-400-40 MG/5ML suspension 15 mL  15 mL Oral Q6H PRN Eugenio Arrieta MD        sennosides-docusate (PERICOLACE) 8.6-50 MG per tablet 2 tablet  2 tablet Oral BID Eugenio Arrieta MD   2 tablet at 08/22/24 2152    And    polyethylene glycol (MIRALAX) packet 17 g  17 g Oral Daily PRN Eugenio Arrieta MD        And    bisacodyl (DULCOLAX) EC tablet 5 mg  5 mg Oral Daily PRN Eugenio Arrieta MD        And    bisacodyl (DULCOLAX) suppository 10 mg  10 mg Rectal Daily PRN Eugenio Arrieta MD        Calcium Replacement - Follow Nurse / BPA Driven Protocol   Does not apply PRN Eugenio Arrieta MD        cholecalciferol (VITAMIN D3) tablet 5,000 Units  5,000 Units Nasogastric Daily Marc Ortiz MD   5,000 Units at 08/22/24 0848    cyanocobalamin injection 1,000 mcg  1,000 mcg Intramuscular Daily Deysi Gambino APRN   1,000 mcg at 08/22/24 0849    dextrose (D50W) (25 g/50 mL) IV injection 25 g  25 g Intravenous Q15 Min PRN Anna Buckley, ANAMARIA        dextrose (GLUTOSE) oral gel 15 g  15 g Oral Q15 Min PRN Anna Buckley, ANAMARIA        folic acid 1 mg in sodium chloride 0.9 % 50 mL IVPB  1 mg Intravenous Daily Eugenio Arrieta  mL/hr at 08/22/24 0848 1 mg at 08/22/24 0848    glucagon (GLUCAGEN) injection 1 mg  1 mg Intramuscular Q15 Min PRN Anna Buckley, ANAMARIA        insulin regular (humuLIN R,novoLIN R) injection 2-7 Units  2-7 Units Subcutaneous Q6H Anna Buckley APRN   2 Units at 08/23/24 0721    ipratropium-albuterol (DUO-NEB) nebulizer solution 3 mL  3 mL Nebulization 4x Daily - RT Anna Buckley APRN   3 mL at 08/23/24 0811    Magnesium Standard Dose Replacement - Follow Nurse / BPA Driven Protocol   Does not  apply PREugenio Allison MD        nitroglycerin (NITROSTAT) SL tablet 0.4 mg  0.4 mg Sublingual Q5 Min PRN Eugenio Arrieta MD        ondansetron ODT (ZOFRAN-ODT) disintegrating tablet 4 mg  4 mg Oral Q6H PRN Eugenio Arrieta MD        Or    ondansetron (ZOFRAN) injection 4 mg  4 mg Intravenous Q6H PRN Eugenio Arrieta MD        phosphorus (K PHOS NEUTRAL) tablet 2 tablet  500 mg Nasogastric 4x Daily Marc Ortiz MD        Phosphorus Replacement - Follow Nurse / BPA Driven Protocol   Does not apply PREugenio Allison MD        potassium chloride (KLOR-CON) packet 40 mEq  40 mEq Oral Q4H Eugenio Arrieta MD        Potassium Replacement - Follow Nurse / BPA Driven Protocol   Does not apply Eugenio Bolivar MD        sodium chloride 0.9 % flush 10 mL  10 mL Intravenous PRN Eugenio Arrieta MD        sodium chloride 0.9 % flush 10 mL  10 mL Intravenous Q12H Eugenio Arrieta MD   10 mL at 08/22/24 2010    sodium chloride 0.9 % flush 10 mL  10 mL Intravenous PRN Eugenio Arrieta MD        sodium chloride 0.9 % infusion 40 mL  40 mL Intravenous PRN Eugenio Arrieta MD        sodium chloride 7 % nebulizer solution nebulizer solution 4 mL  4 mL Nebulization BID - RT Anna Buckley, APRDUKE   4 mL at 08/23/24 0810    vitamin D (ERGOCALCIFEROL) capsule 50,000 Units  50,000 Units Nasogastric Q7 Days Marc Ortiz MD              acetaminophen **OR** acetaminophen    aluminum-magnesium hydroxide-simethicone    senna-docusate sodium **AND** polyethylene glycol **AND** bisacodyl **AND** bisacodyl    Calcium Replacement - Follow Nurse / BPA Driven Protocol    dextrose    dextrose    glucagon (human recombinant)    Magnesium Standard Dose Replacement - Follow Nurse / BPA Driven Protocol    nitroglycerin    ondansetron ODT **OR** ondansetron    Phosphorus Replacement - Follow Nurse / BPA Driven Protocol    Potassium Replacement - Follow Nurse / BPA Driven Protocol    [COMPLETED] Insert Peripheral IV **AND**  "sodium chloride    sodium chloride    sodium chloride  Attest that current medications reviewed including but not limited to prescriptions, over-the counter, herbals and vitamin/mineral/dietary (nutritional) supplements for name, route of administration, type, dose and frequency and are current using all immediate resources available at time of dictation.    A:    /61 (BP Location: Left arm, Patient Position: Lying)   Pulse (!) 123   Temp (!) 101 °F (38.3 °C) (Oral)   Resp (!) 32   Ht 154.9 cm (61\")   Wt 76.9 kg (169 lb 8.5 oz)   SpO2 92%   BMI 32.03 kg/m²     Constitutional:       Appearance: Chronically ill-appearing and acutely ill-appearing.      Comments: High flow @ 11.5 liters   Pulmonary:      Breath sounds: Rhonchi present.   Cardiovascular:      Tachycardia present.   Edema:     Peripheral edema present.  Abdominal:      Comments: NGT right nare   Genitourinary:     Comments: F/c with fernando urine noted  Neurological:      Mental Status: Lethargic and unresponsive.         Patient status: Disease state: Deteriorating despite treatments.  Functional status: Palliative Performance Scale Score: Performance 10% based on the following measures: Ambulation: Totally bed bound, Activity and Evidence of Disease: Unable to do any work, extensive evidence of disease, Self-Care: Total care required,  Intake: Mouth care only, LOC: Drowsy or comatose   Nutritional status: Albumin  2.3 on 8/22/2024 Body mass index is 34.37 kg/m².   Family support: The patient receives support from her granddaughter, Priyanka..  Advance Directives: Advance Directive Status: Patient does not have advance directive   POA/Healthcare surrogate-n/a.      Impression/Problem List:     Encephalopathy  Sepsis  Severe dysphagia  Severe hypernatremia  Vitamin d deficiency    Urinary retention   Osteoarthritis   Depression   Anxiety           Recommendations/Plan:  1.  Transfer to inpatient palliative care to focus on comfort only  2.  " Initiate palliative care order set to be utilized for symptom management   3. Comfort only  4. Stop labs, procedures, artifical nutrition, and medications not geared toward comfort   5. [facility] consult to evaluate for HSB              2.  Palliative care encounter  8/22/2024- The patient is unable to participate in goals of care conversation secondary to impaired cognition. I called her listed of next of kin, granddaughter, Priyanka at 1315. Priyanka reports has no siblings and/or children. She doesn't have an advance directive. She provided a detailed history of patient. She reports up until about 6 months ago she would talk with the patient daily, however the patient lost her phone. She would then use neighbors phone and call periodically. Priyanka did attempt to arrange visits but the patient would make excuses for her not visit. She didn't think much of it because she had just had a baby and relocated to [facility] and she thought the patient didn't want to bother her. She states mid July the patient  called hear and  the patient had requested assistance with paying rent, which was out of the ordinary. Since that call the patient has progressively gotten worst and she has most recently visited the patient home and found it in poor conditions-hoarding. She reports filing an APS case at the recommendation of the  in July.  We did discuss hospital course and reviewed test- MRI findings at her request.  She is having a very difficult time with making decision and wants to make sure there isn't a reversible cause to her current altered state. She did request to talk to neurology and I spoke with ANAMARIA Syed who discussed with her. After discussion with neurology she is wanting to proceed with LP. We discussed in detail CODE status and medical interventions (particularly CPR, ventilator and PEG placement). We reviewed risk vs benefits of such procedures. She is leaning towards  DNR/DNI, but wants to process information further. I have also highly encouraged a visit in person to support her decision making process. She is aware she can inform medical team members at anytime if she desires to change CODE status. She does state she doesn't think she would want a PEG placed as well. She would like to wait for LP findings and then will make decision if she wants to change care structure. I did provide her with information regarding inpatient palliative care unit and hospice care if she desired to change care structure to comfort only. She was very appreciative of conversation and support provided. I will plan to follow up tomorrow for support. I spoke with ANAMARIA Syed and GREGOR Lovell.     8/23/2024- The patient remains unable to participate in goals of care conversation due to impaired cognition. I called her granddaughter, Priyanka at 1129. She has had the opportunity to talk with bedside RN and ANAMARIA Syed this morning. She is aware patient has had significant clinical decline and uncertain if patient could tolerate lumbar puncture. She has decided to transition to comfort only care, manage any symptoms of distress/discomfort and transfer to inpatient palliative care unit. I provided her with detailed information regarding unit structure, visitor policy and potential medications to be utilized for comfort and she is aggreable. In addition, we discussed discontinuing artifical nutrition and stopping labs and other interventions not geared towards comfort and she is agreeable. We also discussed [facility] support and she is agreeable as well, so consult placed. She is planning to come to hospital and will be here around 1430. I have asked bedside RN to update her once patient transferred. I spoke with Dr Arrieta, ANAMARIA Syed, and GREGOR Lovell.         Thank you for this consult and allowing us to participate in patient's plan of care. Palliative Care Team will  continue to follow patient.        Time spent:30 minutes spent reviewing medical and medication records, assessing and examining patient, discussing with family, answering questions, providing some guidance about a plan and documentation of care, and coordinating care with other healthcare members, with > 50% time spent face to face.   30 minutes spent on advance care planning.    Mary Palma, APRN  8/23/2024  09:02 EDT

## 2024-08-23 NOTE — PROGRESS NOTES
"DOS: 2024  NAME: Mimi Pinto   : 1950  PCP: Yadira Watson MD  Chief Complaint   Patient presents with    Abnormal Lab     Subjective: Per nurse patient with worsening respiratory status today; on 11 to 12 L high flow oxygen with sats in the low 90s, respiratory rate in the 40s, tachycardic, temp of 101 this morning, chest x-ray suggestive of pneumonia.  Remains poorly responsive.  Unable to obtain ROS due to severe AMS.    Objective:  Vital signs: /61 (BP Location: Left arm, Patient Position: Lying)   Pulse (!) 123   Temp (!) 101 °F (38.3 °C) (Oral)   Resp (!) 32   Ht 154.9 cm (61\")   Wt 76.9 kg (169 lb 8.5 oz)   SpO2 92%   BMI 32.03 kg/m²       GEN: Chronically/acutely ill-appearing, NAD, v/s reviewed  HEENT: Normocephalic, atraumatic, dry mucus membraines  COR: Tachycardic  Resp: Rhonchi bilaterally.  Extremities: Ecchymosis on extremities, no edema.      Neurological:   MS: Eyes open to noxious stimuli, does not regard, moans but does not attempt to speak, not following commands.   CN: No blink to threat bilaterally, vestibular ocular reflex intact, PERRL, no facial asymmetry  Motor: Withdraws to noxious stimuli in all 4 extremities, no abnormal movements  Sensory: Grimaces, withdraws to noxious stimuli in all 4 extremities  Coordination: Unable to test, not following commands  Patient reexamined, changes noted  Laboratory results:  Lab Results   Component Value Date    GLUCOSE 144 (H) 2024    CALCIUM 8.1 (L) 2024     2024    K 3.2 (L) 2024    CO2 20.3 (L) 2024     2024    BUN 11 2024    CREATININE 0.50 (L) 2024    BCR 22.0 2024    ANIONGAP 13.7 2024     Lab Results   Component Value Date    WBC 10.80 2024    HGB 8.6 (L) 2024    HCT 26.0 (L) 2024    MCV 88.4 2024     2024     No results found for: \"CHOL\"  No results found for: \"HDL\"  No results found for: \"LDL\"  No results " "found for: \"TRIG\"       Review and interpretation of imaging:    Impression:  73-year-old female, smoker, with hyperlipidemia and anxiety/depression with unclear baseline who presented with from SNF with hypernatremia.       She was living alone in an income based senior apartment on the second floor up until recent admission.  She did have an ED visit to [facility] 7/15 after neighbors found her sitting in the yard.  She was found to have conjunctivitis and hypokalemia.  She was noted to be alert and oriented but seemed confused at that time. She went home from the ED.   She returned to [facility] ED 7/26-7/30 due to conjunctivitis, UTI, and sepsis after being found down at home, crawling around on the floor unable to ambulate for at least 2 weeks.  APS was contacted due to the condition of her apartment.  It was reported that she had never drove at baseline and was dependent on family and neighbors to bring her groceries.       She was discharged to a nursing facility on 7/26 and sent back to the hospital 8/15 after labs showed severe hyponatremia with sodium level of 164.  On admission she was found to have leukocytosis but was afebrile.  No infectious source has been identified and empiric antibiotics that were initiated on admission have been discontinued.  In addition to hyponatremia she had several other electrolyte abnormalities and nutritional deficiency as well as hypoxia and difficulty clearing secretions requiring frequent suctioning.  Core track tube has been placed due to dysphagia and difficulty clearing secretions.  Despite improvement in hypernatremia mental status has not improved.  Overnight she developed worsening respiratory status with fever and chest x-ray suggestive of pneumonia.     EEG completed 8/20 showed mild diffuse background slowing but no potential seizure activity.  MRI brain with and without contrast completed overnight showed no acute findings but showed multiple foci of susceptibility " artifact in bilateral cerebral and cerebellar hemispheres consistent with amyloid angiopathy.     Diagnosis:  Acute encephalopathy in part related to hypernatremia, not improved with correction of sodium  Acute hypoxic respiratory failure  Severe dysphagia, no gag reflex  Aspiration pneumonia  Amyloid angiopathy  Dysphagia  Multiple electrolyte derangements  B12, folate, vitamin D deficiencies  The above impression statement reviewed and changes noted.      Plan:  After discussion with her granddaughter Elena yesterday we decided to pursue lumbar puncture to rule out CNS infection as potential cause of her decline.  Due to worsening respiratory status she would not be able to tolerate lying on her abdomen for this.  Furthermore low suspicion for  CNS infection.  I recommended Priyanka consider DNI/DNR, she states she is going to further discussed with palliative team about pursuing comfort measures.  Updated RN at bedside and ANAMARIA Atwood.  Neurology will be available for other questions/concerns.

## 2024-08-23 NOTE — PROGRESS NOTES
[facility] Progress Note      Patient Name: Mimi Pinto  : 1950  MRN: [id number]  Primary Care Physician:  Yadira Watson MD  Date of admission: 8/15/2024    Subjective     Interval History:     Overnight no event  Continues to have episodes of borderline hypoxia that has require supplemental oxygen and treatment for secretions continues to be on a core track feeds    Primary team has been able to reach out to her power of  and decision was made to transfer the patient to comfort measures today       Review of Systems:   As noted above    Objective     Vitals:   Temp:  [98.7 °F (37.1 °C)-101 °F (38.3 °C)] 101 °F (38.3 °C)  Heart Rate:  [101-124] 123  Resp:  [18-32] 32  BP: ()/(58-75) 107/61  Flow (L/min):  [2.5-11] 11    Intake/Output Summary (Last 24 hours) at 2024 1245  Last data filed at 2024 0700  Gross per 24 hour   Intake 1656 ml   Output 200 ml   Net 1456 ml       Physical Exam:    General Appearance: Chronically ill and deconditioned bilateral temporal wasting  Skin: warm and dry  HEENT: oral mucosa normal, nonicteric sclera.  Edentulous.  Core track in place  Neck: supple, no JVD  Lungs:  bilateral fine crackles on deep inspiration  Heart: RRR, normal S1 and S2  Abdomen: soft, nontender, nondistended  : no palpable bladder  Extremities: no edema, cyanosis or clubbing  Neuro: Patient awake unable to establish a coherent conversation no obvious focalization,    Scheduled Meds:     sodium chloride, 10 mL, Intravenous, Q12H      IV Meds:          Results Reviewed:   I have personally reviewed the results from the time of this admission to 2024 12:45 EDT     Results from last 7 days   Lab Units 24  0547 24  0641 24  1934 24  0817 24  2251 24  0900 24  0510   SODIUM mmol/L 141 144  --  142   < > 144 154*   POTASSIUM mmol/L 3.2* 3.8 4.5 3.6   < > 2.9* 3.5  3.5   CHLORIDE mmol/L 107 113*  --  112*   <  > 114* 124*   CO2 mmol/L 20.3* 20.2*  --  17.9*   < > 20.0* 20.0*   BUN mg/dL 11 8  --  6*   < > 11 20   CREATININE mg/dL 0.50* 0.52*  --  0.45*   < > 0.63 0.75   CALCIUM mg/dL 8.1* 8.4*  --  8.3*   < > 8.2* 8.2*   BILIRUBIN mg/dL  --   --   --   --   --  0.6 0.8   ALK PHOS U/L  --   --   --   --   --  90 84   ALT (SGPT) U/L  --   --   --   --   --  38* 36*   AST (SGOT) U/L  --   --   --   --   --  44* 34*   GLUCOSE mg/dL 144* 149*  --  88   < > 144* 89    < > = values in this interval not displayed.       Estimated Creatinine Clearance: 95.4 mL/min (A) (by C-G formula based on SCr of 0.5 mg/dL (L)).    Results from last 7 days   Lab Units 08/23/24  0547 08/22/24  0641 08/21/24  1934 08/18/24  1755 08/18/24  0900   MAGNESIUM mg/dL  --   --   --   --  2.1   PHOSPHORUS mg/dL 1.5* 2.5  2.5 1.7*   < > 1.8*    < > = values in this interval not displayed.             Results from last 7 days   Lab Units 08/23/24  0547 08/22/24  0641 08/18/24  0925 08/17/24  0510   WBC 10*3/mm3 10.80  --  15.07* 18.96*   HEMOGLOBIN g/dL 8.6*  --  9.8* 10.2*   PLATELETS 10*3/mm3 243 238 138* 123*               Assessment / Plan     ASSESSMENT:    - Hypernatremia secondary to encephalopathy .  Will discontinue dextrose fluids and will adjust the water flushes via core track based on sodium trend  -Sepsis with lactic acidosis.  Patient completed course of cefepime   -Encephalopathy/vascular dementia per primary team avoiding sedatives  -Hypokalemia with hypophosphatemia continues to require IV replacement  -Severe vitamin D deficiency and hypocalcemia causing secondary hyperparathyroidism patient started on vitamin D replacement.   -No evidence of renal tubular acidosis based on  ABG .  But mainly consistent with respiratory alkalosis and chronic metabolic alkalosis    PLAN:   family has decided to transition to comfort measures  Will sign off    Discussed with nursing staff     Thank you for involving us in the care of Mimi Pinto.  Please  feel free to call with any questions.    Marc Ortiz MD  08/23/24  12:45 EDT    [facility]  [phone]    Please note that portions of this note were completed with a voice recognition program.

## 2024-08-23 NOTE — PLAN OF CARE
Goal Outcome Evaluation:           Progress: declining  Outcome Evaluation: Pt transferred to [facility] at 1445 for comfort care. Unresponsive upon arrival. 8L O2 high flow. Cardozo intact. Audible congestion--recovery position maintained. 0.4mg robinul x1 and 2mg morphine x1 for comfort. Coccyx excoriated. Family meeting with hospice this evening.

## 2024-08-23 NOTE — PROGRESS NOTES
"Dr. LE Arrieta    [facility]        Patient ID:  Name:  Mimi Pinto  MRN:  [id number]  1950  73 y.o.  female            CC/Reason for visit: Dysphagia, acute hypoxemia, and many other medical problem     Interval hx: Patient remains encephalopathic, confused.  Does not follow any commands.  Now hypoxic, requiring more oxygen, poorly responsive     ROS: Unobtainable    Vitals:  Vitals:    08/23/24 0810 08/23/24 0811 08/23/24 0818 08/23/24 0820   BP:       BP Location:       Patient Position:       Pulse: (!) 124 120 (!) 122 (!) 123   Resp: (!) 31 (!) 32     Temp:       TempSrc:       SpO2: 90% 90% 92% 92%   Weight:       Height:               Body mass index is 32.03 kg/m².    Intake/Output Summary (Last 24 hours) at 8/23/2024 1205  Last data filed at 8/23/2024 0700  Gross per 24 hour   Intake 1656 ml   Output 200 ml   Net 1456 ml       Exam:  GEN:  Appears chronically ill  Poorly responsive, nonverbal, does not open eyes  LUNGS: Scattered rhonchi bilat, no use of accessory muscles  CV:  Normal S1S2, without murmur, no edema  ABD:  Non tender, no enlarged liver or masses      Scheduled meds:  sodium chloride, 10 mL, Intravenous, Q12H      IV meds:                           Data Review:   I reviewed the patient's medications and new clinical results.    No results found for: \"COVID19\"        Results from last 7 days   Lab Units 08/23/24  0547 08/22/24  0641 08/21/24  1934 08/21/24  0817 08/18/24  2251 08/18/24  0925 08/18/24  0900 08/17/24  0510   SODIUM mmol/L 141 144  --  142   < >  --  144 154*   POTASSIUM mmol/L 3.2* 3.8 4.5 3.6   < >  --  2.9* 3.5  3.5   CHLORIDE mmol/L 107 113*  --  112*   < >  --  114* 124*   CO2 mmol/L 20.3* 20.2*  --  17.9*   < >  --  20.0* 20.0*   BUN mg/dL 11 8  --  6*   < >  --  11 20   CREATININE mg/dL 0.50* 0.52*  --  0.45*   < >  --  0.63 0.75   CALCIUM mg/dL 8.1* 8.4*  --  8.3*   < >  --  8.2* 8.2*   BILIRUBIN mg/dL  --   --   --   --   --   --  0.6 0.8 "   ALK PHOS U/L  --   --   --   --   --   --  90 84   ALT (SGPT) U/L  --   --   --   --   --   --  38* 36*   AST (SGOT) U/L  --   --   --   --   --   --  44* 34*   GLUCOSE mg/dL 144* 149*  --  88   < >  --  144* 89   WBC 10*3/mm3 10.80  --   --   --   --  15.07*  --  18.96*   HEMOGLOBIN g/dL 8.6*  --   --   --   --  9.8*  --  10.2*   PLATELETS 10*3/mm3 243 238  --   --   --  138*  --  123*   PROCALCITONIN ng/mL 1.39*  --   --   --   --   --  0.33*  --     < > = values in this interval not displayed.               ASSESSMENT:   Acute hypoxic respiratory failure   Acute metabolic encephalopathy   Aspiration pneumonia  Severe dysphagia, no gag reflex  severe Hypernatremia  Severe Hyperchloremia  Lactic acidosis  Urinary retention  Sepsis  HLD  OA  Depression  Anxiety  UTI  Altered Mental Status  Recent prolonged hospitalization after being found down with rhabdomyolysis and uti  Granulomatous disease significant on chest imaging  Thrombocytopenia  Stool impaction      PLAN:  The patient's condition has gradually declined over the past 5 days.  Unfortunately she has developed acute respiratory failure due to severe dysphagia and lack of gag reflex, with inability to control her own oral secretions.  She is now developed aspiration pneumonia.  She has failed to respond to all treatment given in the hospital, in the ICU.  All of this was communicated to the patient's granddaughter.  She met with palliative care and discussed the patient's situation.  After a lengthy conversation she has decided to no longer proceed with lumbar puncture, and to stop all medical therapy and provide simple comfort measures only and allow natural death.  We will transfer the patient to palliative care floor, consult hospitalist or Dr. Chiu to take over care.        Eugenio Arrieta MD  8/23/2024

## 2024-08-23 NOTE — PLAN OF CARE
Goal Outcome Evaluation:  Plan of Care Reviewed With: patient        Progress: declining  Outcome Evaluation: Not following commands. Nonverbal. Oral care done q2 hours due to secretions and dryness. Pulse ox dropped, placed on High Flow at beginning of shift. TF, [place] maintained. Skin excoriated and open area noted to gluteal, skin care provided. Rhonchi, xray done.

## 2024-08-23 NOTE — CONSULTS
[facility] admit 8/23/24  [place] ID [id number]    Primary diagnosis: ICD 10: G93.40    Symptom management for dyspnea and pain.    Met with pt cb at bedside and provided explanation of services. Consents signed and written materail provided. Spoke to Dr Weber on phone and he will place discharge readmit to [facility] today. Dr Chiu to take over tomorrow.    Thank you for the referral.    Ebony Reis RN  [facility]  [phone]

## 2024-08-24 NOTE — CASE MANAGEMENT/SOCIAL WORK
Case Management Discharge Note      Final Note: Pt  on 24 at 0144.         Selected Continued Care - Discharged on 2024 Admission date: 2024 - Discharge disposition:       Destination    No services have been selected for the patient.                Durable Medical Equipment    No services have been selected for the patient.                Dialysis/Infusion    No services have been selected for the patient.                Home Medical Care    No services have been selected for the patient.                Therapy    No services have been selected for the patient.                Community Resources    No services have been selected for the patient.                Community & DME    No services have been selected for the patient.                         Final Discharge Disposition Code: 41 -  in medical facility

## 2024-08-24 NOTE — DISCHARGE SUMMARY
PHYSICIAN DISCHARGE SUMMARY                                                                        [facility]    Date of admit: 8/15/2024  Date of Discharge:  8/24/2024    PCP: Yadira Watson MD    Discharge Diagnosis:   Acute hypoxic respiratory failure       Secondary Diagnoses:  Acute metabolic encephalopathy   Aspiration pneumonia  Severe dysphagia, no gag reflex  severe Hypernatremia  Severe Hyperchloremia  Lactic acidosis  Urinary retention  Sepsis  HLD  OA  Depression  Anxiety  UTI  Altered Mental Status  Recent prolonged hospitalization after being found down with rhabdomyolysis and uti  Granulomatous disease significant on chest imaging  Thrombocytopenia  Stool impaction    Procedures Performed           Consults:       Hospital Course  Patient is a 73 y.o. female presented with  from facility with altered mental status.  She is obtunded and really not able to give me any reliable history.  She does awaken with stimulation mumbles however it is really not coherent and cannot provide any reliable history. Discussed with ER provider who relates that the patient had been rather independent up until last hospital stay.  Reportedly she had a prolonged hospital stay with sepsis and urinary tract infection.  She was discharged to rehab facility where she was brought in from today secondary to altered mental status. Her sodium on her renal function panel was 164.  Her white count is elevated.  We are asked to admit for severe hyponatremia altered mental status and concern for sepsis with UTI being potential source. Patient is unable to verify any previous medical history surgical history social history family history medications or allergies.     Patient's condition gradually declined over the course of the last 5 days. Unfortunately she has developed acute respiratory failure due to severe dysphagia and lack of gag reflex, with  inability to control her own oral secretions.  She is now developed aspiration pneumonia. She has failed to respond to all treatment given in the hospital, in the ICU. All of this was communicated to the patient's granddaughter. She met with palliative care and discussed the patient's situation. After a lengthy conversation she has decided to no longer proceed with lumbar puncture, and to stop all medical therapy and provide simple comfort measures only and allow natural death.  We will transfer the patient to palliative care floor and [facility] Chiu to take over care.        Condition on Discharge:  Critical      Vital Signs  Temp:  [99.6 °F (37.6 °C)-101 °F (38.3 °C)] 99.6 °F (37.6 °C)  Heart Rate:  [0-124] 0  Resp:  [0-36] 0  BP: ()/(51-66) 71/51    Consults:   IP CONSULT TO NEPHROLOGY  IP CONSULT TO NEUROLOGY  IP CONSULT TO NUTRITION SERVICES  IP CONSULT TO PALLIATIVE CARE TEAM  IP CONSULT TO HOSPICE    Significant Discharge Diagnostics   Procedures Performed:         Pertinent Lab Results:  Results from last 7 days   Lab Units 08/23/24  0547 08/22/24  0641 08/21/24  1934 08/21/24  0817 08/20/24  0523 08/19/24  1643 08/19/24  0427 08/18/24  2251 08/18/24  0900   SODIUM mmol/L 141 144  --  142 145 146* 142  --  144   POTASSIUM mmol/L 3.2* 3.8 4.5 3.6 3.8  3.8 3.4* 3.0*   < > 2.9*   CHLORIDE mmol/L 107 113*  --  112* 118* 115* 113*  --  114*   CO2 mmol/L 20.3* 20.2*  --  17.9* 19.0* 18.3* 17.0*  --  20.0*   BUN mg/dL 11 8  --  6* 7* 7* 8  --  11   CREATININE mg/dL 0.50* 0.52*  --  0.45* 0.46* 0.48* 0.50*  --  0.63   GLUCOSE mg/dL 144* 149*  --  88 118* 107* 145*  --  144*   CALCIUM mg/dL 8.1* 8.4*  --  8.3* 8.2* 8.1* 7.8*  --  8.2*   AST (SGOT) U/L  --   --   --   --   --   --   --   --  44*   ALT (SGPT) U/L  --   --   --   --   --   --   --   --  38*    < > = values in this interval not displayed.         Results from last 7 days   Lab Units 08/23/24  0501  "08/22/24  0641 08/18/24  0925   WBC 10*3/mm3 10.80  --  15.07*   HEMOGLOBIN g/dL 8.6*  --  9.8*   HEMATOCRIT % 26.0*  --  29.7*   PLATELETS 10*3/mm3 243 238 138*   MCV fL 88.4  --  88.9   MCH pg 29.3  --  29.3   MCHC g/dL 33.1  --  33.0   RDW % 15.0  --  14.5   RDW-SD fl 46.5  --  45.4   MPV fL 9.5  --  10.8   NEUTROPHIL % %  --   --  86.1*   LYMPHOCYTE % %  --   --  7.6*   MONOCYTES % %  --   --  4.4*   EOSINOPHIL % %  --   --  0.2*   BASOPHIL % %  --   --  0.1   IMM GRAN % %  --   --  1.6*   NEUTROS ABS 10*3/mm3 8.81*  --  12.97*   LYMPHS ABS 10*3/mm3  --   --  1.15   MONOS ABS 10*3/mm3  --   --  0.66   EOS ABS 10*3/mm3 0.00  --  0.03   BASOS ABS 10*3/mm3 0.00  --  0.02   IMMATURE GRANS (ABS) 10*3/mm3  --   --  0.24*   NRBC /100 WBC 3.1*  1.0*  --   --          Results from last 7 days   Lab Units 08/18/24  0900   MAGNESIUM mg/dL 2.1           Invalid input(s): \"LDLCALC\"          Results from last 7 days   Lab Units 08/21/24  1911   PH, ARTERIAL pH units 7.496*   PO2 ART mm Hg 55.6*   PCO2, ARTERIAL mm Hg 28.9*   HCO3 ART mmol/L 22.3     Results from last 7 days   Lab Units 08/23/24  0547 08/18/24  0900   PROCALCITONIN ng/mL 1.39* 0.33*                               Imaging Results:  Imaging Results (All)       Procedure Component Value Units Date/Time    XR Chest 1 View [[id number]] Collected: 08/23/24 0617     Updated: 08/23/24 0622    Narrative:      XR CHEST 1 VW-     INDICATION: Increased oxygen demands     COMPARISON: Chest radiographs dating back to 8/15/2024. CT chest  8/21/2024       Impression:      Increased confluent opacities throughout the right lower  lobe suggestive of pneumonitis/pneumonia. Innumerable nodular opacities  best characterized on recent chest CT. No pleural effusion or  pneumothorax. Normal size cardiomediastinal silhouette. Enteric tube  overlying stomach exiting the field-of-view.     This report was finalized on 8/23/2024 6:19 AM by Dr. Conrad Dunn M.D on Workstation: " [id number]       MRI Brain With & Without Contrast [[id number]] Collected: 08/22/24 0435     Updated: 08/22/24 0443    Narrative:      BRAIN MRI WITH AND WITHOUT CONTRAST     HISTORY: Altered mental status nonresolving; A41.9-Sepsis, unspecified  organism; R65.21-Severe sepsis with septic shock; E87.0-Hyperosmolality  and hypernatremia     COMPARISON: None.     FINDINGS:  Multiplanar images of the head were obtained without and with  gadolinium. No areas of restricted diffusion are seen to suggest acute  infarct. There is atrophy. There is periventricular and deep white  matter microangiopathic change. There is no midline shift or mass  effect. Old lacunar infarct versus prominent perivascular space is noted  within the right basal ganglia. Intracranial flow voids appear intact.  Multiple foci of susceptibility artifact are noted within the cerebellar  hemispheres bilaterally, as well as the cerebral hemispheres  bilaterally, although more significant on the right. These are likely  related to chronic hemosiderin deposition. The distribution raises the  possibility of amyloid angiopathy. Mucosal thickening is noted within  the ethmoid sinuses. Postcontrast imaging does not demonstrate any  abnormal enhancement. Trace fluid is noted within the right mastoid air  cells.       Impression:      1. No acute intracranial abnormality. Multiple foci of susceptibility  artifact noted within the cerebellar hemispheres bilaterally, as well as  both cerebral hemispheres. Appearance raises the possibility of amyloid  angiopathy.        This report was finalized on 8/22/2024 4:40 AM by Dr. Bethanie Champion M.D on Workstation: [id number]       CT Chest Without Contrast Diagnostic [[id number]] Collected: 08/21/24 1453     Updated: 08/21/24 1508    Narrative:      CT CHEST WITHOUT CONTRAST     HISTORY: Further evaluation of pulmonary nodules.     TECHNIQUE: Radiation dose reduction techniques were utilized, including  automated  exposure control and exposure modulation based on body size.   3 mm images were obtained through the chest without the administration  of IV contrast.     COMPARISON: Chest radiographs dating back to 8/15/2024. CT abdomen  pelvis 8/15/2024.        FINDINGS: Right fourth fifth, sixth nondisplaced anterior rib fractures  with callus formation, suggesting at least sub acuity.     Enteric tube within the stomach. Tip is outside the exam field-of-view.  Heart is normal in size. Mild coronary artery calcifications. Small  volume pericardial fluid. Nondilated main pulmonary artery and thoracic  aorta. Numerous calcified mediastinal and hilar lymph nodes.  Decompressed esophagus.     Respiratory motion limits evaluation of the lungs. Peribronchial  consolidation with intermixed groundglass opacities throughout much of  the right lower lobe. Mixed confluent collapse/consolidation throughout  the majority of the left lower lobe. Majority of the left lower lobe  bronchi are not aerated. A minority small portion of the left lower lobe  remains aerated superiorly. Innumerable calcified granulomas. No pleural  effusion.          Impression:      1. Exam is limited by motion.  2. Bilateral lower lobe pneumonitis/pneumonia. Majority of the left  lower lobe is consolidated/collapsed. Recommend imaging follow-up to  document clearance.  3. Innumerable bilateral calcified granulomas correlating with prior  radiographs.     This report was finalized on 8/21/2024 3:05 PM by Dr. Conrad Dunn M.D on Workstation: [id number]       XR Chest 1 View [[id number]] Collected: 08/21/24 0407     Updated: 08/21/24 0413    Narrative:      SINGLE VIEW OF THE CHEST     HISTORY: Low saturations     COMPARISON: August 16, 2024     FINDINGS:  Heart size is within normal limits. No pneumothorax, pleural effusion,  or acute infiltrate is seen. There is calcification of the aorta.  Innumerable nodules are seen throughout the lungs bilaterally.        Impression:      No significant interval change.     This report was finalized on 8/21/2024 4:10 AM by Dr. Bethanie Champion M.D on Workstation: [id number]       CT Abdomen Pelvis Without Contrast [[id number]] Collected: 08/15/24 1839     Updated: 08/15/24 1850    Narrative:      CT ABDOMEN PELVIS WO CONTRAST-     DATE OF EXAM: 8/15/2024 5:50 PM     INDICATION: Sepsis.     COMPARISON: Chest radiograph 8/15/2024.     TECHNIQUE: Multiple contiguous axial images were acquired through the  abdomen and pelvis without the intravenous administration of contrast.  Reformatted coronal and sagittal sequences were also reviewed. Radiation  dose reduction techniques were utilized, including automated exposure  control and exposure modulation based on body size.     FINDINGS:  Numerous calcified granulomas in each lung base. Mild bilateral  dependent atelectasis with dependent groundglass opacities in the base  of the right lower lobe that could represent superimposed atypical  pneumonia.     The liver, gallbladder, spleen, pancreas, adrenal glands, and kidneys  are unremarkable in limited noncontrast CT appearance. No renal or  ureteral stone is identified but no hydronephrosis or hydroureter. The  urinary bladder, uterus, and adnexa are unremarkable in limited  noncontrast CT appearance. Phleboliths in the pelvis.     Tiny hiatal hernia. Duodenal diverticulum. Mild colonic stool with  moderate formed stool distention of the rectum. No bowel obstruction or  significant bowel wall thickening. The appendix is normal.     No free fluid in the abdomen or pelvis. No free intraperitoneal air. No  pathologically enlarged lymph nodes in the abdomen or pelvis. Normal  variant circumaortic left renal vein. Mild calcified atherosclerotic  disease in the distal abdominal aorta and its distal branches without  aneurysm.     Osseous fusion across the L5 and S1 vertebral bodies. Moderate to severe  multilevel lumbar spondylosis. Mild to  moderate bilateral hip and SI  joint DJD and mild to moderate DJD of the pubic symphysis. No acute  osseous abnormality or concerning osseous lesion.       Impression:         1. No acute noncontrast CT abnormality in the abdomen or pelvis.  2. Bibasilar dependent ectasis with dependent groundglass opacities in  the base of the right lower lobe that could reflect superimposed  atypical pneumonia.     This report was finalized on 8/15/2024 6:47 PM by Sean Hollis MD on  Workstation: [id number]       XR Chest 1 View [[id number]] Collected: 08/15/24 1541     Updated: 08/15/24 1547    Narrative:      XR CHEST 1 VW-     HISTORY: Female who is 73 years-old, sepsis, hypotensive     TECHNIQUE: Frontal view of the chest     COMPARISON: None available     FINDINGS: The heart size is normal. Pulmonary vasculature is  unremarkable. Aorta is calcified. Many dense nodules throughout the  lungs are noted, compatible with all granulomatous disease. Numerosity  of calcified nodules limits assessment for noncalcified nodules; if  indicated, CT could be considered for further evaluation. No focal  pulmonary consolidation, pleural effusion, or pneumothorax. No acute  osseous process.       Impression:      As described.     This report was finalized on 8/15/2024 3:44 PM by Dr. Wesley Jasmine M.D on Workstation: [id number]             --    Discharge Disposition  Hospice/Medical Facility ([facility])    Discharge Medications     Discharge Medications        ASK your doctor about these medications        Instructions Start Date   docusate sodium 100 MG capsule  Commonly known as: COLACE   100 mg, Oral, 2 Times Daily      nystatin 100,000 unit/mL suspension  Commonly known as: MYCOSTATIN   500,000 Units, Swish & Spit, 4 Times Daily      selenium sulfide 1 % lotion  Commonly known as: SELSUN   1 Application, Topical, 2 Times Daily, Cleanse under bilateral breasts with selsun blue until healed               Discharge Diet:  regular diet    Activity at Discharge:      Follow-up Information       Yadira Watson MD .    Specialty: Internal Medicine  Contact information:  [address]  [address]  [facility] [id number] [phone]                           See primary care provider, Yadira Watson MD, in 1-2 weeks        Test Results Pending at Discharge  Pending Labs       Order Current Status    Fungal Antibodies, Quantitative Double Immunodiffusion In process    Histoplasma Antibodies In process    Vitamin A & E In process             Manuel Weber [facility], [facility]  Pulmonary and Critical Care Medicine  08/24/24  07:10 EDT      Time: greater than 30 minutes.        Total time spent discharging patient including evaluation, post hospitalization follow up, medication and post hospitalization instructions and education total time exceeds 30 minutes.

## 2024-08-24 NOTE — CASE MANAGEMENT/SOCIAL WORK
Case Management Discharge Note      Final Note: Pt admitted to [facility] Scattered Bed on 8/23/24.    Provided Post Acute Provider List?: Yes    Selected Continued Care - Discharged on 8/23/2024 Admission date: 8/15/2024 - Discharge disposition: Hospice/Medical Facility ([facility])      Destination    No services have been selected for the patient.                Durable Medical Equipment    No services have been selected for the patient.                Dialysis/Infusion    No services have been selected for the patient.                Home Medical Care    No services have been selected for the patient.                Therapy    No services have been selected for the patient.                Community Resources    No services have been selected for the patient.                Community & DME    No services have been selected for the patient.                         Final Discharge Disposition Code: 51 - hospice medical facility

## 2024-08-25 LAB — H CAPSUL AB TITR SER ID: NEGATIVE {TITER}

## 2024-08-28 LAB
A-TOCOPHEROL VIT E SERPL-MCNC: 9.8 MG/L (ref 9–29)
GAMMA TOCOPHEROL SERPL-MCNC: 1.6 MG/L (ref 0.5–4.9)
VIT A SERPL-MCNC: 5.4 UG/DL (ref 22–69.5)

## 2024-08-28 NOTE — DISCHARGE SUMMARY
Discharge As      Date of Admisssion:  2024  Date of Death:  2024  Time of Death:  1:44 AM    Patient Care Team:  Yadira Watson MD as PCP - General (Internal Medicine)    Final Diagnosis:     End of life care  Acute metabolic encephalopathy   Aspiration pneumonia  Acute hypoxic respiratory failure   Severe dysphagia, no gag reflex  severe Hypernatremia  Severe Hyperchloremia  Lactic acidosis  Urinary retention  Sepsis  HLD  OA  Depression  Anxiety  UTI  Altered Mental Status  Recent prolonged hospitalization after being found down with rhabdomyolysis and uti  Granulomatous disease significant on chest imaging  Thrombocytopenia  Stool impaction    Hospital Course  Patient was a 73 y.o. female who was admitted as a hospice scattered bed patient 2024.  Please see the patient's discharge summary by Manuel Weber DO.  Prior to seeing the patient, I was called that the patient's respirations ceased and no pulse palpable. No heart sounds audible. I pronounced the patient at 0144 hours.      Jame Chiu MD  Hospice and Palliative Medicine  24  15:52 EDT